# Patient Record
Sex: MALE | Race: BLACK OR AFRICAN AMERICAN | NOT HISPANIC OR LATINO | Employment: FULL TIME
[De-identification: names, ages, dates, MRNs, and addresses within clinical notes are randomized per-mention and may not be internally consistent; named-entity substitution may affect disease eponyms.]

---

## 2019-11-05 ENCOUNTER — HEALTH MAINTENANCE LETTER (OUTPATIENT)
Age: 59
End: 2019-11-05

## 2020-01-22 ENCOUNTER — OFFICE VISIT (OUTPATIENT)
Dept: FAMILY MEDICINE | Facility: CLINIC | Age: 60
End: 2020-01-22
Payer: COMMERCIAL

## 2020-01-22 VITALS
HEART RATE: 61 BPM | DIASTOLIC BLOOD PRESSURE: 84 MMHG | OXYGEN SATURATION: 98 % | BODY MASS INDEX: 26.52 KG/M2 | WEIGHT: 165 LBS | SYSTOLIC BLOOD PRESSURE: 139 MMHG | HEIGHT: 66 IN | TEMPERATURE: 97.6 F

## 2020-01-22 DIAGNOSIS — M54.41 CHRONIC BILATERAL LOW BACK PAIN WITH BILATERAL SCIATICA: ICD-10-CM

## 2020-01-22 DIAGNOSIS — Z00.00 ROUTINE GENERAL MEDICAL EXAMINATION AT A HEALTH CARE FACILITY: Primary | ICD-10-CM

## 2020-01-22 DIAGNOSIS — G89.29 CHRONIC BILATERAL LOW BACK PAIN WITH BILATERAL SCIATICA: ICD-10-CM

## 2020-01-22 DIAGNOSIS — M54.42 CHRONIC BILATERAL LOW BACK PAIN WITH BILATERAL SCIATICA: ICD-10-CM

## 2020-01-22 PROCEDURE — 36415 COLL VENOUS BLD VENIPUNCTURE: CPT | Performed by: NURSE PRACTITIONER

## 2020-01-22 PROCEDURE — 99386 PREV VISIT NEW AGE 40-64: CPT | Mod: 25 | Performed by: NURSE PRACTITIONER

## 2020-01-22 PROCEDURE — 90715 TDAP VACCINE 7 YRS/> IM: CPT | Performed by: NURSE PRACTITIONER

## 2020-01-22 PROCEDURE — 82306 VITAMIN D 25 HYDROXY: CPT | Performed by: NURSE PRACTITIONER

## 2020-01-22 PROCEDURE — 80048 BASIC METABOLIC PNL TOTAL CA: CPT | Performed by: NURSE PRACTITIONER

## 2020-01-22 PROCEDURE — 80061 LIPID PANEL: CPT | Performed by: NURSE PRACTITIONER

## 2020-01-22 PROCEDURE — 99213 OFFICE O/P EST LOW 20 MIN: CPT | Mod: 25 | Performed by: NURSE PRACTITIONER

## 2020-01-22 PROCEDURE — 90471 IMMUNIZATION ADMIN: CPT | Performed by: NURSE PRACTITIONER

## 2020-01-22 PROCEDURE — G0103 PSA SCREENING: HCPCS | Performed by: NURSE PRACTITIONER

## 2020-01-22 ASSESSMENT — ENCOUNTER SYMPTOMS
ABDOMINAL PAIN: 0
HEMATOCHEZIA: 0
EYE PAIN: 0
FEVER: 0
NERVOUS/ANXIOUS: 0
COUGH: 0
DIARRHEA: 0
CONSTIPATION: 0
HEMATURIA: 0
FREQUENCY: 0
SORE THROAT: 0
HEADACHES: 0
MYALGIAS: 0
PARESTHESIAS: 0
WEAKNESS: 0
DYSURIA: 0
NAUSEA: 0
HEARTBURN: 0
JOINT SWELLING: 0
ARTHRALGIAS: 0
DIZZINESS: 0
PALPITATIONS: 0
SHORTNESS OF BREATH: 0
CHILLS: 0

## 2020-01-22 ASSESSMENT — MIFFLIN-ST. JEOR: SCORE: 1508.25

## 2020-01-22 NOTE — PROGRESS NOTES
SUBJECTIVE:   CC: Callum Haywood is an 59 year old male who presents for preventative health visit.     Healthy Habits:     Getting at least 3 servings of Calcium per day:  Yes    Bi-annual eye exam:  Yes    Dental care twice a year:  NO    Sleep apnea or symptoms of sleep apnea:  None    Diet:  Carbohydrate counting    Duration of exercise:  15-30 minutes    Taking medications regularly:  Yes    Medication side effects:  None and Other    PHQ-2 Total Score: 2    Additional concerns today:  No  low back pain - continues, present for many years  Pain can radiate down the back of his thighs bilaterally intermittently.  No paresthesias, weakness.  Worse with standing or sitting in place for long periods of time.    He has tried physical therapy.    He does run most days of the week, no pain with running.               Today's PHQ-2 Score:   PHQ-2 ( 1999 [employer]) 1/22/2020   Q1: Little interest or pleasure in doing things 1   Q2: Feeling down, depressed or hopeless 1   PHQ-2 Score 2   Q1: Little interest or pleasure in doing things Several days   Q2: Feeling down, depressed or hopeless Several days   PHQ-2 Score 2       Abuse: Current or Past(Physical, Sexual or Emotional)- No  Do you feel safe in your environment? Yes    Have you ever done Advance Care Planning? (For example, a Health Directive, POLST, or a discussion with a medical provider or your loved ones about your wishes): No     Social History     Tobacco Use     Smoking status: Never Smoker     Smokeless tobacco: Never Used   Substance Use Topics     Alcohol use: Yes     Comment: once a month         Alcohol Use 1/22/2020   Prescreen: >3 drinks/day or >7 drinks/week? No   Prescreen: >3 drinks/day or >7 drinks/week? -       Last PSA:   PSA   Date Value Ref Range Status   08/11/2016 0.61 0 - 4 ug/L Final     Comment:     Assay Method:  Chemiluminescence using Siemens Vista analyzer       Reviewed orders with patient. Reviewed health maintenance and updated  "orders accordingly - Yes      Reviewed and updated as needed this visit by clinical staff  Tobacco  Allergies  Meds  Med Hx  Surg Hx  Fam Hx  Soc Hx        Reviewed and updated as needed this visit by Provider            Review of Systems   Constitutional: Negative for chills and fever.   HENT: Negative for congestion, ear pain, hearing loss and sore throat.    Eyes: Negative for pain and visual disturbance.   Respiratory: Negative for cough and shortness of breath.    Cardiovascular: Negative for chest pain, palpitations and peripheral edema.   Gastrointestinal: Negative for abdominal pain, constipation, diarrhea, heartburn, hematochezia and nausea.   Genitourinary: Negative for discharge, dysuria, frequency, genital sores, hematuria, impotence and urgency.   Musculoskeletal: Negative for arthralgias, joint swelling and myalgias.   Skin: Negative for rash.   Neurological: Negative for dizziness, weakness, headaches and paresthesias.   Psychiatric/Behavioral: Negative for mood changes. The patient is not nervous/anxious.          OBJECTIVE:   /84   Pulse 61   Temp 97.6  F (36.4  C) (Oral)   Ht 1.664 m (5' 5.5\")   Wt 74.8 kg (165 lb)   SpO2 98%   BMI 27.04 kg/m      Physical Exam  GENERAL: healthy, alert and no distress  EYES: Eyes grossly normal to inspection, PERRL and conjunctivae and sclerae normal  HENT: ear canals and TM's normal, nose and mouth without ulcers or lesions  NECK: no adenopathy, no asymmetry, masses, or scars and thyroid normal to palpation  RESP: lungs clear to auscultation - no rales, rhonchi or wheezes  CV: regular rate and rhythm, normal S1 S2, no S3 or S4, no murmur, click or rub, no peripheral edema and peripheral pulses strong  ABDOMEN: soft, nontender, no hepatosplenomegaly, no masses and bowel sounds normal  MS: Back inspection: symmetrical, no spinal curvature  Tenderness: no vertebral tenderness  Musculoskeletal: ROM: full  Neuro: Strength: 5/5 lower extremities " "bilaterally, able to heel walk and toe walk  Sensation: sensation to light touch grossly intact and equal bilaterally  Reflexes: patellar reflex 2/4 bilaterally, achilles reflex 2/4 bilaterally  Straight leg raise: negative  Cross leg raise: negative  SKIN: no suspicious lesions or rashes  NEURO: Normal strength and tone, mentation intact and speech normal  PSYCH: mentation appears normal, affect normal/bright        ASSESSMENT/PLAN:   (Z00.00) Routine general medical examination at a health care facility  (primary encounter diagnosis)  Comment:   Plan: Lipid panel reflex to direct LDL Fasting, PSA,         screen, Vitamin D Deficiency, Basic metabolic         panel            (M54.42,  M54.41,  G89.29) Chronic bilateral low back pain with bilateral sciatica  Comment:   Plan: MR Lumbar Spine w/o Contrast        Symptoms persist despite conservative treatment.  Will get an MRI and follow up with results.  Consider referral to spine specialist.       COUNSELING:   Reviewed preventive health counseling, as reflected in patient instructions    Estimated body mass index is 27.04 kg/m  as calculated from the following:    Height as of this encounter: 1.664 m (5' 5.5\").    Weight as of this encounter: 74.8 kg (165 lb).     Weight management plan: Discussed healthy diet and exercise guidelines     reports that he has never smoked. He has never used smokeless tobacco.      Counseling Resources:  ATP IV Guidelines  Pooled Cohorts Equation Calculator  FRAX Risk Assessment  ICSI Preventive Guidelines  Dietary Guidelines for Americans, 2010  USDA's MyPlate  ASA Prophylaxis  Lung CA Screening    Stephie Lewis NP  [facility]  "

## 2020-01-23 LAB
ANION GAP SERPL CALCULATED.3IONS-SCNC: 6 MMOL/L (ref 3–14)
BUN SERPL-MCNC: 10 MG/DL (ref 7–30)
CALCIUM SERPL-MCNC: 9.2 MG/DL (ref 8.5–10.1)
CHLORIDE SERPL-SCNC: 105 MMOL/L (ref 94–109)
CHOLEST SERPL-MCNC: 234 MG/DL
CO2 SERPL-SCNC: 28 MMOL/L (ref 20–32)
CREAT SERPL-MCNC: 0.73 MG/DL (ref 0.66–1.25)
DEPRECATED CALCIDIOL+CALCIFEROL SERPL-MC: 30 UG/L (ref 20–75)
GFR SERPL CREATININE-BSD FRML MDRD: >90 ML/MIN/{1.73_M2}
GLUCOSE SERPL-MCNC: 107 MG/DL (ref 70–99)
HDLC SERPL-MCNC: 51 MG/DL
LDLC SERPL CALC-MCNC: 138 MG/DL
NONHDLC SERPL-MCNC: 183 MG/DL
POTASSIUM SERPL-SCNC: 4.3 MMOL/L (ref 3.4–5.3)
PSA SERPL-ACNC: 0.67 UG/L (ref 0–4)
SODIUM SERPL-SCNC: 139 MMOL/L (ref 133–144)
TRIGL SERPL-MCNC: 224 MG/DL

## 2020-01-30 ENCOUNTER — ANCILLARY PROCEDURE (OUTPATIENT)
Dept: MRI IMAGING | Facility: CLINIC | Age: 60
End: 2020-01-30
Attending: NURSE PRACTITIONER
Payer: COMMERCIAL

## 2020-01-30 DIAGNOSIS — M54.42 CHRONIC BILATERAL LOW BACK PAIN WITH BILATERAL SCIATICA: ICD-10-CM

## 2020-01-30 DIAGNOSIS — G89.29 CHRONIC BILATERAL LOW BACK PAIN WITH BILATERAL SCIATICA: ICD-10-CM

## 2020-01-30 DIAGNOSIS — M54.41 CHRONIC BILATERAL LOW BACK PAIN WITH BILATERAL SCIATICA: ICD-10-CM

## 2020-02-04 DIAGNOSIS — G89.29 CHRONIC BILATERAL LOW BACK PAIN WITH BILATERAL SCIATICA: Primary | ICD-10-CM

## 2020-02-04 DIAGNOSIS — M54.41 CHRONIC BILATERAL LOW BACK PAIN WITH BILATERAL SCIATICA: Primary | ICD-10-CM

## 2020-02-04 DIAGNOSIS — M54.42 CHRONIC BILATERAL LOW BACK PAIN WITH BILATERAL SCIATICA: Primary | ICD-10-CM

## 2020-02-13 ENCOUNTER — DOCUMENTATION ONLY (OUTPATIENT)
Dept: FAMILY MEDICINE | Facility: CLINIC | Age: 60
End: 2020-02-13

## 2020-02-19 ENCOUNTER — OFFICE VISIT (OUTPATIENT)
Dept: ORTHOPEDICS | Facility: CLINIC | Age: 60
End: 2020-02-19
Payer: COMMERCIAL

## 2020-02-19 VITALS
WEIGHT: 168.2 LBS | SYSTOLIC BLOOD PRESSURE: 110 MMHG | HEIGHT: 66 IN | BODY MASS INDEX: 27.03 KG/M2 | DIASTOLIC BLOOD PRESSURE: 72 MMHG

## 2020-02-19 DIAGNOSIS — M54.42 CHRONIC BILATERAL LOW BACK PAIN WITH BILATERAL SCIATICA: ICD-10-CM

## 2020-02-19 DIAGNOSIS — M51.369 LUMBAR DEGENERATIVE DISC DISEASE: Primary | ICD-10-CM

## 2020-02-19 DIAGNOSIS — M54.41 CHRONIC BILATERAL LOW BACK PAIN WITH BILATERAL SCIATICA: ICD-10-CM

## 2020-02-19 DIAGNOSIS — G89.29 CHRONIC BILATERAL LOW BACK PAIN WITH BILATERAL SCIATICA: ICD-10-CM

## 2020-02-19 PROCEDURE — 99243 OFF/OP CNSLTJ NEW/EST LOW 30: CPT | Performed by: FAMILY MEDICINE

## 2020-02-19 ASSESSMENT — MIFFLIN-ST. JEOR: SCORE: 1522.76

## 2020-02-19 NOTE — PROGRESS NOTES
CHIEF COMPLAINT:  Low back pain affecting lower extremities     REFERRED BY: Stephie Lewis NP    HISTORY OF PRESENT ILLNESS  Mr. Haywood is a pleasant 59 year old year old male who presents to clinic today with acute on chronic low back pain.  Present for many years without acute inciting event or trauma.  Pain localizes to bilateral low back with radiation into posterior thighs.  No associated weakness.  Exacerbated by prolonged sitting or standing. Improved with laying and positional changes.    Today patient states that he has continued pain of posterior thighs with prolonged sitting as he was in his car.  Currently no pain down legs or back.  Pain is exacerbated with rotational components of the back. He exercises regularly and performs HEP given to him by PT in 2015.    Treatment to date: PT 2015, no medications OTC, MRI last month.      Additional history: as documented    MEDICAL HISTORY  Patient Active Problem List   Diagnosis     GERD     IBS     PSYCHOSEXUAL DISORDER     HYPERLIPIDEMIA LDL GOAL <160     Posttraumatic stress disorder     Health Care Home     Low back pain       Current Outpatient Medications   Medication Sig Dispense Refill     cholecalciferol (VITAMIN D) 1000 UNIT tablet Take 1 tablet (1,000 Units) by mouth daily 100 tablet 3       Allergies   Allergen Reactions     No Known Allergies      Seasonal Allergies        Family History   Problem Relation Age of Onset     C.A.D. No family hx of      Diabetes No family hx of      Hypertension No family hx of      Cerebrovascular Disease No family hx of      Breast Cancer No family hx of      Cancer - colorectal No family hx of      Prostate Cancer No family hx of      Alcohol/Drug No family hx of      Allergies No family hx of      Alzheimer Disease No family hx of        Additional medical/Social/Surgical histories reviewed in [facility] and updated as appropriate.     REVIEW OF SYSTEMS (2/19/2020)  A 10-point review of systems was obtained and  is negative except for as noted in the HPI.      PHYSICAL EXAM  There were no vitals taken for this visit.    General  - normal appearance, in no obvious distress  CV  - normal peripheral perfusion  Pulm  - normal respiratory pattern, non-labored  Musculoskeletal - lumbar spine  - stance: normal gait without limp, no obvious leg length discrepancy, normal heel and toe walk  - inspection: normal bone and joint alignment, no obvious scoliosis  - palpation: no paravertebral or bony tenderness  - ROM: flexion and rotation, left sidebending exacerbates pain, normal unrestricted flexion, extension, sidebending, rotation  - strength: lower extremities 5/5 in all planes  - special tests:  (-) straight leg raise  (-) slump test  Neuro  - patellar and Achilles DTRs 2+ bilaterally, no lower extremity sensory deficit throughout L5 distribution, grossly normal coordination, normal muscle tone  Skin  - no ecchymosis, erythema, warmth, or induration, no obvious rash  Psych  - interactive, appropriate, normal mood and affect    IMAGING :   Impression: Multilevel lumbar spondylosis, the most notable as  follows:   1. L4-L5 moderate spinal canal narrowing and likely impingement on the  descending L5 nerve roots bilaterally. Moderate bilateral L4-L5 neural  foraminal narrowing.  2. L5-S1 with moderate to severe left neural foraminal narrowing and  impingement on the exiting L5 nerve root. Moderate right neural  foraminal narrowing.      I have personally reviewed the examination and initial interpretation  and I agree with the findings.     DIMAS CASTILLO MD    ASSESSMENT & PLAN    Diagnosis: Lumbar degenerative disc disease.  Lumbar radiculopathy affecting bilateral lower extremities.    Mr. Haywood is a 59 year old year old male who presents to clinic today with acute on chronic low back pain.  MRI revealing L4-S1 spinal stenosis with impingements on L5 nerve root.  We did detailed discussion about treatment options going  forward for his lumbar pathology.  At this time my recommendation would be to consider epidural steroid injection, most likely we would target level of L4-L5.  I also recommended that he consider repeat physical therapy after epidural injection.  He is less keen on this option as he had a poor response previously, I encouraged him that this is usually synergistic with injection.  In addition to this I discussed that if he does not want an epidural, he may consider gabapentin as an option.  Given his overall disinterest in pharmacotherapy, I do not think he would be as compliant or interested in taking this medication 3 times daily.  Lastly we discussed surgical options and he would like to avoid this at all costs at this time.  I believe that given his lack of initial interventions that we can manage this more conservatively with the first options outlined above.    He would like to discuss these options with Dr. Lewis and he may call my office if he wishes to proceed with the options we outlined.  I will place orders at that time. He also knows I would like to see him 2-3 weeks after YANIRA if he proceeds this direction.    It was a pleasure seeing Callum today.    Moiz Hayes DO, CAM  Primary Care Sports Medicine

## 2020-02-19 NOTE — PROGRESS NOTES
3        SPORTS & ORTHOPEDIC WALK-IN VISIT 2/19/2020    Primary Care Physician: Dr. Hayes    Reason for visit:     What part of your body is injured / painful?  bilateral low back    What caused the injury /pain? No inciting event     How long ago did your injury occur or pain begin? 10 years, worsening past 3 months    What are your most bothersome symptoms? Pain and Weakness    How would you characterize your symptom?  aching and sharp    What makes your symptoms better? Nothing    What makes your symptoms worse? Other: Running and trunk movement    Have you been previously seen for this problem? Yes    Medical History:    Any recent changes to your medical history? No    Any new medication prescribed since last visit? No    Have you had surgery on this body part before? No    Social History:    Occupation:     Handedness: Right    Exercise: Most days/week

## 2020-02-19 NOTE — LETTER
2/19/2020         RE: Callum Haywood  [address]  [place] [address]        Dear Colleague,    Thank you for referring your patient, aCllum Haywood, to the [facility]. Please see a copy of my visit note below.    CHIEF COMPLAINT:  Low back pain affecting lower extremities     REFERRED BY: Stephie Lewis NP    HISTORY OF PRESENT ILLNESS  Mr. Haywood is a pleasant 59 year old year old male who presents to clinic today with acute on chronic low back pain.  Present for many years without acute inciting event or trauma.  Pain localizes to bilateral low back with radiation into posterior thighs.  No associated weakness.  Exacerbated by prolonged sitting or standing. Improved with laying and positional changes.    Today patient states that he has continued pain of posterior thighs with prolonged sitting as he was in his car.  Currently no pain down legs or back.  Pain is exacerbated with rotational components of the back. He exercises regularly and performs HEP given to him by PT in 2015.    Treatment to date: PT 2015, no medications OTC, MRI last month.      Additional history: as documented    MEDICAL HISTORY  Patient Active Problem List   Diagnosis     GERD     IBS     PSYCHOSEXUAL DISORDER     HYPERLIPIDEMIA LDL GOAL <160     Posttraumatic stress disorder     Health Care Home     Low back pain       Current Outpatient Medications   Medication Sig Dispense Refill     cholecalciferol (VITAMIN D) 1000 UNIT tablet Take 1 tablet (1,000 Units) by mouth daily 100 tablet 3       Allergies   Allergen Reactions     No Known Allergies      Seasonal Allergies        Family History   Problem Relation Age of Onset     C.A.D. No family hx of      Diabetes No family hx of      Hypertension No family hx of      Cerebrovascular Disease No family hx of      Breast Cancer No family hx of      Cancer - colorectal No family hx of      Prostate Cancer No family hx of       Alcohol/Drug No family hx of      Allergies No family hx of      Alzheimer Disease No family hx of        Additional medical/Social/Surgical histories reviewed in [facility] and updated as appropriate.     REVIEW OF SYSTEMS (2/19/2020)  A 10-point review of systems was obtained and is negative except for as noted in the HPI.      PHYSICAL EXAM  There were no vitals taken for this visit.    General  - normal appearance, in no obvious distress  CV  - normal peripheral perfusion  Pulm  - normal respiratory pattern, non-labored  Musculoskeletal - lumbar spine  - stance: normal gait without limp, no obvious leg length discrepancy, normal heel and toe walk  - inspection: normal bone and joint alignment, no obvious scoliosis  - palpation: no paravertebral or bony tenderness  - ROM: flexion and rotation, left sidebending exacerbates pain, normal unrestricted flexion, extension, sidebending, rotation  - strength: lower extremities 5/5 in all planes  - special tests:  (-) straight leg raise  (-) slump test  Neuro  - patellar and Achilles DTRs 2+ bilaterally, no lower extremity sensory deficit throughout L5 distribution, grossly normal coordination, normal muscle tone  Skin  - no ecchymosis, erythema, warmth, or induration, no obvious rash  Psych  - interactive, appropriate, normal mood and affect    IMAGING :   Impression: Multilevel lumbar spondylosis, the most notable as  follows:   1. L4-L5 moderate spinal canal narrowing and likely impingement on the  descending L5 nerve roots bilaterally. Moderate bilateral L4-L5 neural  foraminal narrowing.  2. L5-S1 with moderate to severe left neural foraminal narrowing and  impingement on the exiting L5 nerve root. Moderate right neural  foraminal narrowing.      I have personally reviewed the examination and initial interpretation  and I agree with the findings.     DIMAS CASTILLO MD    ASSESSMENT & PLAN    Diagnosis: Lumbar degenerative disc disease.  Lumbar radiculopathy affecting  bilateral lower extremities.    Mr. Haywood is a 59 year old year old male who presents to clinic today with acute on chronic low back pain.  MRI revealing L4-S1 spinal stenosis with impingements on L5 nerve root.  We did detailed discussion about treatment options going forward for his lumbar pathology.  At this time my recommendation would be to consider epidural steroid injection, most likely we would target level of L4-L5.  I also recommended that he consider repeat physical therapy after epidural injection.  He is less keen on this option as he had a poor response previously, I encouraged him that this is usually synergistic with injection.  In addition to this I discussed that if he does not want an epidural, he may consider gabapentin as an option.  Given his overall disinterest in pharmacotherapy, I do not think he would be as compliant or interested in taking this medication 3 times daily.  Lastly we discussed surgical options and he would like to avoid this at all costs at this time.  I believe that given his lack of initial interventions that we can manage this more conservatively with the first options outlined above.    He would like to discuss these options with Dr. Lewis and he may call my office if he wishes to proceed with the options we outlined.  I will place orders at that time. He also knows I would like to see him 2-3 weeks after YANIRA if he proceeds this direction.    It was a pleasure seeing Callum today.    Moiz Hayes DO, [facility]  Primary Care Sports Medicine      3        SPORTS & ORTHOPEDIC WALK-IN VISIT 2/19/2020    Primary Care Physician: Dr. Hayes    Reason for visit:     What part of your body is injured / painful?  bilateral low back    What caused the injury /pain? No inciting event     How long ago did your injury occur or pain begin? 10 years, worsening past 3 months    What are your most bothersome symptoms? Pain and Weakness    How would you characterize your symptom?  aching  and sharp    What makes your symptoms better? Nothing    What makes your symptoms worse? Other: Running and trunk movement    Have you been previously seen for this problem? Yes    Medical History:    Any recent changes to your medical history? No    Any new medication prescribed since last visit? No    Have you had surgery on this body part before? No    Social History:    Occupation:     Handedness: Right    Exercise: Most days/week         Again, thank you for allowing me to participate in the care of your patient.        Sincerely,        Moiz Hayes, DO

## 2020-02-26 ENCOUNTER — OFFICE VISIT (OUTPATIENT)
Dept: FAMILY MEDICINE | Facility: CLINIC | Age: 60
End: 2020-02-26
Payer: COMMERCIAL

## 2020-02-26 VITALS
TEMPERATURE: 97.4 F | RESPIRATION RATE: 14 BRPM | BODY MASS INDEX: 26.68 KG/M2 | HEART RATE: 65 BPM | DIASTOLIC BLOOD PRESSURE: 78 MMHG | SYSTOLIC BLOOD PRESSURE: 128 MMHG | HEIGHT: 66 IN | OXYGEN SATURATION: 98 % | WEIGHT: 166 LBS

## 2020-02-26 DIAGNOSIS — M51.369 DDD (DEGENERATIVE DISC DISEASE), LUMBAR: Primary | ICD-10-CM

## 2020-02-26 PROCEDURE — 99213 OFFICE O/P EST LOW 20 MIN: CPT | Performed by: NURSE PRACTITIONER

## 2020-02-26 ASSESSMENT — MIFFLIN-ST. JEOR: SCORE: 1512.78

## 2020-02-26 NOTE — Clinical Note
I meet with Callum today and he would like to proceed with the epidural.  He would prefer that it be scheduled on a Friday, if possible.  I told him he should hear from someone from your team to schedule this.

## 2020-02-26 NOTE — PROGRESS NOTES
"SUBJECTIVE:  Callum Haywood, a 59 year old male scheduled an appointment to discuss the following issues:  DDD (degenerative disc disease), lumbar  He has been having bilateral thigh pain, recent MRI showed:  Impression: Multilevel lumbar spondylosis, the most notable as  follows:   1. L4-L5 moderate spinal canal narrowing and likely impingement on the  descending L5 nerve roots bilaterally. Moderate bilateral L4-L5 neural  foraminal narrowing.  2. L5-S1 with moderate to severe left neural foraminal narrowing and  impingement on the exiting L5 nerve root. Moderate right neural  foraminal narrowing  Medical, social, surgical, and family histories reviewed.    He did see sports medicine and treatment options were discussed.  He wanted to follow up with me to discuss these options.    ROS:  CONSTITUTIONAL: NEGATIVE for fever, chills  RESP: NEGATIVE for significant cough or SOB  CV: NEGATIVE for chest pain, palpitations   GI: NEGATIVE for nausea, abdominal pain, heartburn, or change in bowel habits  : NEGATIVE for frequency, dysuria, or hematuria  MUSCULOSKELETAL:see HPI  NEURO: NEGATIVE for weakness, dizziness or paresthesias or headache    OBJECTIVE:  /78   Pulse 65   Temp 97.4  F (36.3  C)   Resp 14   Ht 1.664 m (5' 5.5\")   Wt 75.3 kg (166 lb)   SpO2 98%   BMI 27.20 kg/m    EXAM:  GENERAL APPEARANCE: healthy, alert and no distress  PSYCH: mentation appears normal and affect normal/bright    ASSESSMENT/PLAN:  (M51.36) DDD (degenerative disc disease), lumbar  (primary encounter diagnosis)  Comment:   Plan: Discussed Dr. Hayes's recommendations and he is comfortable proceeding with YANIRA.  I will reach out to Dr. Hayes to go ahead with scheduling this .          "

## 2020-02-27 DIAGNOSIS — G89.29 CHRONIC BILATERAL LOW BACK PAIN WITH BILATERAL SCIATICA: Primary | ICD-10-CM

## 2020-02-27 DIAGNOSIS — M54.42 CHRONIC BILATERAL LOW BACK PAIN WITH BILATERAL SCIATICA: Primary | ICD-10-CM

## 2020-02-27 DIAGNOSIS — M54.41 CHRONIC BILATERAL LOW BACK PAIN WITH BILATERAL SCIATICA: Primary | ICD-10-CM

## 2020-03-09 ENCOUNTER — OFFICE VISIT (OUTPATIENT)
Dept: URGENT CARE | Facility: URGENT CARE | Age: 60
End: 2020-03-09
Payer: COMMERCIAL

## 2020-03-09 VITALS
BODY MASS INDEX: 27.2 KG/M2 | RESPIRATION RATE: 16 BRPM | HEART RATE: 77 BPM | TEMPERATURE: 98.6 F | OXYGEN SATURATION: 99 % | WEIGHT: 166 LBS | DIASTOLIC BLOOD PRESSURE: 80 MMHG | SYSTOLIC BLOOD PRESSURE: 143 MMHG

## 2020-03-09 DIAGNOSIS — R51.9 MILD HEADACHE: ICD-10-CM

## 2020-03-09 DIAGNOSIS — L29.9 PRURITIC DISORDER: Primary | ICD-10-CM

## 2020-03-09 DIAGNOSIS — K30 UPSET STOMACH: ICD-10-CM

## 2020-03-09 LAB
BASOPHILS # BLD AUTO: 0 10E9/L (ref 0–0.2)
BASOPHILS NFR BLD AUTO: 0.2 %
DIFFERENTIAL METHOD BLD: NORMAL
EOSINOPHIL # BLD AUTO: 0 10E9/L (ref 0–0.7)
EOSINOPHIL NFR BLD AUTO: 0.5 %
ERYTHROCYTE [DISTWIDTH] IN BLOOD BY AUTOMATED COUNT: 12.1 % (ref 10–15)
HCT VFR BLD AUTO: 44.4 % (ref 40–53)
HGB BLD-MCNC: 15.3 G/DL (ref 13.3–17.7)
LYMPHOCYTES # BLD AUTO: 1 10E9/L (ref 0.8–5.3)
LYMPHOCYTES NFR BLD AUTO: 17.4 %
MCH RBC QN AUTO: 30.6 PG (ref 26.5–33)
MCHC RBC AUTO-ENTMCNC: 34.5 G/DL (ref 31.5–36.5)
MCV RBC AUTO: 89 FL (ref 78–100)
MONOCYTES # BLD AUTO: 0.8 10E9/L (ref 0–1.3)
MONOCYTES NFR BLD AUTO: 14.3 %
NEUTROPHILS # BLD AUTO: 3.7 10E9/L (ref 1.6–8.3)
NEUTROPHILS NFR BLD AUTO: 67.6 %
PLATELET # BLD AUTO: 198 10E9/L (ref 150–450)
RBC # BLD AUTO: 5 10E12/L (ref 4.4–5.9)
WBC # BLD AUTO: 5.5 10E9/L (ref 4–11)

## 2020-03-09 PROCEDURE — 36415 COLL VENOUS BLD VENIPUNCTURE: CPT | Performed by: FAMILY MEDICINE

## 2020-03-09 PROCEDURE — 99214 OFFICE O/P EST MOD 30 MIN: CPT | Performed by: FAMILY MEDICINE

## 2020-03-09 PROCEDURE — 85025 COMPLETE CBC W/AUTO DIFF WBC: CPT | Performed by: FAMILY MEDICINE

## 2020-03-09 NOTE — PROGRESS NOTES
SUBJECTIVE: Callum Haywood is a 59 year old male presenting with a chief complaint of itchy skin, ha and upset stomach.  Onset of symptoms was 2 day(s) ago.  Course of illness is improving.    Severity mild  Current and Associated symptoms: none  Treatment measures tried include None tried.  Predisposing factors include None.    Past Medical History:   Diagnosis Date     Esophageal reflux      Helicobacter pylori (H. pylori)      Irritable bowel syndrome      Other specified psychosexual disorder      Pure hypercholesterolemia      Allergies   Allergen Reactions     No Known Allergies      Seasonal Allergies      Social History     Tobacco Use     Smoking status: Never Smoker     Smokeless tobacco: Never Used   Substance Use Topics     Alcohol use: Yes     Comment: once a month       ROS:  SKIN: no rash  GI: no vomiting    OBJECTIVE:  BP (!) 143/80   Pulse 77   Temp 98.6  F (37  C) (Oral)   Resp 16   Wt 75.3 kg (166 lb)   SpO2 99%   BMI 27.20 kg/m  GENERAL APPEARANCE: healthy, alert and no distress  EYES: EOMI,  PERRL, conjunctiva clear  HENT: ear canals and TM's normal.  Nose and mouth without ulcers, erythema or lesions  NECK: supple, nontender, no lymphadenopathy  RESP: lungs clear to auscultation - no rales, rhonchi or wheezes  CV: regular rates and rhythm, normal S1 S2, no murmur noted  ABDOMEN:  soft, nontender, no HSM or masses and bowel sounds normal  NEURO: Normal strength and tone, sensory exam grossly normal,  normal speech and mentation  SKIN: no suspicious lesions or rashes      ICD-10-CM    1. Pruritic disorder  L29.9 CBC with platelets differential   2. Upset stomach  K30 CBC with platelets differential   3. Mild headache  R51 CBC with platelets differential       Fluids/Rest, f/u if worse/not any better

## 2020-03-10 ENCOUNTER — OFFICE VISIT (OUTPATIENT)
Dept: URGENT CARE | Facility: URGENT CARE | Age: 60
End: 2020-03-10
Payer: COMMERCIAL

## 2020-03-10 VITALS
TEMPERATURE: 97.9 F | WEIGHT: 165 LBS | OXYGEN SATURATION: 98 % | DIASTOLIC BLOOD PRESSURE: 79 MMHG | SYSTOLIC BLOOD PRESSURE: 118 MMHG | HEART RATE: 85 BPM | BODY MASS INDEX: 26.52 KG/M2 | HEIGHT: 66 IN

## 2020-03-10 DIAGNOSIS — R42 DIZZINESS: ICD-10-CM

## 2020-03-10 DIAGNOSIS — R51.9 TEMPORAL HEADACHE: Primary | ICD-10-CM

## 2020-03-10 DIAGNOSIS — R51.9 FRONTAL HEADACHE: ICD-10-CM

## 2020-03-10 LAB — ERYTHROCYTE [SEDIMENTATION RATE] IN BLOOD BY WESTERGREN METHOD: 36 MM/H (ref 0–20)

## 2020-03-10 PROCEDURE — 85652 RBC SED RATE AUTOMATED: CPT | Performed by: INTERNAL MEDICINE

## 2020-03-10 PROCEDURE — 99214 OFFICE O/P EST MOD 30 MIN: CPT | Performed by: INTERNAL MEDICINE

## 2020-03-10 PROCEDURE — 80048 BASIC METABOLIC PNL TOTAL CA: CPT | Performed by: INTERNAL MEDICINE

## 2020-03-10 PROCEDURE — 36415 COLL VENOUS BLD VENIPUNCTURE: CPT | Performed by: INTERNAL MEDICINE

## 2020-03-10 RX ORDER — NAPROXEN 500 MG/1
500 TABLET ORAL 2 TIMES DAILY WITH MEALS
Qty: 28 TABLET | Refills: 0 | Status: SHIPPED | OUTPATIENT
Start: 2020-03-10 | End: 2020-03-24

## 2020-03-10 RX ORDER — ACETAMINOPHEN 500 MG
500-1000 TABLET ORAL EVERY 6 HOURS PRN
COMMUNITY
End: 2021-02-22

## 2020-03-10 RX ORDER — IBUPROFEN 200 MG
200 TABLET ORAL EVERY 4 HOURS PRN
COMMUNITY
End: 2021-02-22

## 2020-03-10 ASSESSMENT — ENCOUNTER SYMPTOMS
SLEEP DISTURBANCE: 0
WEAKNESS: 0
RHINORRHEA: 0
BRUISES/BLEEDS EASILY: 0
NUMBNESS: 0
DIARRHEA: 0
SORE THROAT: 0
COUGH: 0
VOMITING: 0
DIFFICULTY URINATING: 0
SHORTNESS OF BREATH: 0
SPEECH DIFFICULTY: 0
EYE PAIN: 0
FACIAL ASYMMETRY: 0

## 2020-03-10 ASSESSMENT — MIFFLIN-ST. JEOR: SCORE: 1508.25

## 2020-03-10 NOTE — LETTER
[facility]  [address] [place] [address]  Phone: [phone]    March 10, 2020        Callum Wangdeyoanna  [address]  [place] [address]          To whom it may concern:    RE: Callum Haywood    Patient was seen and treated today at our clinic and missed work.  Please excuse work absence for up to 3 days for illness.    Please contact me for questions or concerns.      Sincerely,        Silke Sawant MD

## 2020-03-10 NOTE — PATIENT INSTRUCTIONS
Naprosyn 500 mg 2 x day with food  Sleep.  Stretching.  Yoga  Ice /heat.    Hansen, 3 meals, 2 snacks    Recheck 2-3 days at clinic.    Work note.

## 2020-03-10 NOTE — PROGRESS NOTES
SUBJECTIVE:   Callum Haywood is a 59 year old male presenting with a chief complaint of   Chief Complaint   Patient presents with     Urgent Care     Pt in clinic to have eval for headache and dizziness.     Headache     Dizziness       He is an established patient of [facility].    Headache    Onset of symptoms was 3 -4 day(s).    Seen yesterday for symptoms     Blood test.    Component      Latest Ref Rng & Units 3/9/2020   WBC      4.0 - 11.0 10e9/L 5.5   RBC Count      4.4 - 5.9 10e12/L 5.00   Hemoglobin      13.3 - 17.7 g/dL 15.3   Hematocrit      40.0 - 53.0 % 44.4   MCV      78 - 100 fl 89   MCH      26.5 - 33.0 pg 30.6   MCHC      31.5 - 36.5 g/dL 34.5   RDW      10.0 - 15.0 % 12.1   Platelet Count      150 - 450 10e9/L 198   % Neutrophils      % 67.6   % Lymphocytes      % 17.4   % Monocytes      % 14.3   % Eosinophils      % 0.5   % Basophils      % 0.2   Absolute Neutrophil      1.6 - 8.3 10e9/L 3.7   Absolute Lymphocytes      0.8 - 5.3 10e9/L 1.0   Absolute Monocytes      0.0 - 1.3 10e9/L 0.8   Absolute Eosinophils      0.0 - 0.7 10e9/L 0.0   Absolute Basophils      0.0 - 0.2 10e9/L 0.0   Diff Method       Automated Method     Constant headache     Character of pain:pressure-like   Current and associated symptoms:   Dizziness.  Dizzy with standing up.  Pulsing headache at temporal headache   denies noise sensitivity, light sensitivity, visual disturbances, double vision, partial visual loss, paresthesias, aphasia and mental status changes  Location of pain: frontal and temporal  Prodromal sx?:  No  History of Migranes: No  Is headache similar to previous: No:   Predisposing factors: None  Treatment and measures tried: Tylenol    Work - thinks chemicals at work causing mask.  Exercises   Caffeine - coffee & tea daily - none while sick  No alcohol    Whole body ached  Stomach/back ache  Back pain improved     Tylenol helped aches        Review of Systems   HENT: Negative for rhinorrhea and sore throat.  "   Eyes: Negative for pain and visual disturbance.        Tearing eyes   Respiratory: Negative for cough and shortness of breath.    Gastrointestinal: Negative for diarrhea and vomiting.        Stomach ache improved    Genitourinary: Negative for difficulty urinating.   Musculoskeletal:        Aches resolved    Skin: Negative for rash.   Neurological: Negative for facial asymmetry, speech difficulty, weakness and numbness.   Hematological: Does not bruise/bleed easily.   Psychiatric/Behavioral: Negative for sleep disturbance.       Past Medical History:   Diagnosis Date     Esophageal reflux      Helicobacter pylori (H. pylori)      Irritable bowel syndrome      Other specified psychosexual disorder      Pure hypercholesterolemia      Family History   Problem Relation Age of Onset     C.A.D. No family hx of      Diabetes No family hx of      Hypertension No family hx of      Cerebrovascular Disease No family hx of      Breast Cancer No family hx of      Cancer - colorectal No family hx of      Prostate Cancer No family hx of      Alcohol/Drug No family hx of      Allergies No family hx of      Alzheimer Disease No family hx of      Current Outpatient Medications   Medication Sig Dispense Refill     acetaminophen (TYLENOL) 500 MG tablet Take 500-1,000 mg by mouth every 6 hours as needed for mild pain       cholecalciferol (VITAMIN D) 1000 UNIT tablet Take 1 tablet (1,000 Units) by mouth daily 100 tablet 3     ibuprofen (ADVIL/MOTRIN) 200 MG tablet Take 200 mg by mouth every 4 hours as needed for mild pain       naproxen (NAPROSYN) 500 MG tablet Take 1 tablet (500 mg) by mouth 2 times daily (with meals) for 14 days 28 tablet 0     Social History     Tobacco Use     Smoking status: Never Smoker     Smokeless tobacco: Never Used   Substance Use Topics     Alcohol use: Yes     Comment: once a month       OBJECTIVE  /79   Pulse 85   Temp 97.9  F (36.6  C) (Oral)   Ht 1.664 m (5' 5.5\")   Wt 74.8 kg (165 lb)   " SpO2 98%   BMI 27.04 kg/m      Physical Exam  Vitals signs reviewed.   Constitutional:       Appearance: Normal appearance.   HENT:      Right Ear: Tympanic membrane normal.      Left Ear: Tympanic membrane normal.      Nose: Nose normal.      Mouth/Throat:      Mouth: Mucous membranes are moist.   Eyes:      Extraocular Movements: Extraocular movements intact.      Conjunctiva/sclera: Conjunctivae normal.      Pupils: Pupils are equal, round, and reactive to light.      Comments: right fundi normal   Unable to visualize left    Neck:      Musculoskeletal: Normal range of motion and neck supple.   Cardiovascular:      Rate and Rhythm: Normal rate and regular rhythm.      Pulses: Normal pulses.      Heart sounds: Normal heart sounds.   Pulmonary:      Effort: Pulmonary effort is normal.      Breath sounds: Normal breath sounds.   Abdominal:      Palpations: Abdomen is soft.   Musculoskeletal:      Right lower leg: No edema.      Left lower leg: No edema.   Neurological:      General: No focal deficit present.      Mental Status: He is alert.      Cranial Nerves: No cranial nerve deficit.      Sensory: No sensory deficit.      Motor: No weakness.   Psychiatric:         Mood and Affect: Mood normal.         Behavior: Behavior normal.         Thought Content: Thought content normal.         Judgment: Judgment normal.         Labs:  No results found for this or any previous visit (from the past 24 hour(s)).        ASSESSMENT:      ICD-10-CM    1. Temporal headache  R51 Erythrocyte sedimentation rate auto     Basic metabolic panel     naproxen (NAPROSYN) 500 MG tablet   2. Frontal headache  R51 Erythrocyte sedimentation rate auto     Basic metabolic panel     naproxen (NAPROSYN) 500 MG tablet   3. Dizziness  R42 Basic metabolic panel        Medical Decision Making:    Differential Diagnosis:  Muscle tension  Illness  Chemical exposure at work  ESR for temporal arthritis    Neuro exam normal - doubt serious underlying  disease  Heart & lung normal for dizzyness    BMP for dizziness    Already had normal CBC yesterday    Serious Comorbid Conditions:  Adult:  None    PLAN:    Followup:    Recheck 2-3 days with clinic  Results will be on mychart    Patient Instructions   Naprosyn 500 mg 2 x day with food  Sleep.  Stretching.  Yoga  Ice /heat.    Hansen, 3 meals, 2 snacks    Recheck 2-3 days at clinic.    Work note.

## 2020-03-11 LAB
ANION GAP SERPL CALCULATED.3IONS-SCNC: 4 MMOL/L (ref 3–14)
BUN SERPL-MCNC: 12 MG/DL (ref 7–30)
CALCIUM SERPL-MCNC: 8.7 MG/DL (ref 8.5–10.1)
CHLORIDE SERPL-SCNC: 106 MMOL/L (ref 94–109)
CO2 SERPL-SCNC: 28 MMOL/L (ref 20–32)
CREAT SERPL-MCNC: 0.74 MG/DL (ref 0.66–1.25)
GFR SERPL CREATININE-BSD FRML MDRD: >90 ML/MIN/{1.73_M2}
GLUCOSE SERPL-MCNC: 159 MG/DL (ref 70–99)
POTASSIUM SERPL-SCNC: 3.6 MMOL/L (ref 3.4–5.3)
SODIUM SERPL-SCNC: 138 MMOL/L (ref 133–144)

## 2020-03-12 ENCOUNTER — HOSPITAL ENCOUNTER (OUTPATIENT)
Age: 60
End: 2020-03-12
Payer: COMMERCIAL

## 2020-03-12 ENCOUNTER — OFFICE VISIT (OUTPATIENT)
Dept: FAMILY MEDICINE | Facility: CLINIC | Age: 60
End: 2020-03-12
Payer: COMMERCIAL

## 2020-03-12 VITALS
HEART RATE: 63 BPM | BODY MASS INDEX: 27.43 KG/M2 | WEIGHT: 167.38 LBS | TEMPERATURE: 97.4 F | SYSTOLIC BLOOD PRESSURE: 135 MMHG | OXYGEN SATURATION: 100 % | DIASTOLIC BLOOD PRESSURE: 78 MMHG

## 2020-03-12 DIAGNOSIS — M54.42 CHRONIC BILATERAL LOW BACK PAIN WITH BILATERAL SCIATICA: ICD-10-CM

## 2020-03-12 DIAGNOSIS — R51.9 NONINTRACTABLE HEADACHE, UNSPECIFIED CHRONICITY PATTERN, UNSPECIFIED HEADACHE TYPE: Primary | ICD-10-CM

## 2020-03-12 DIAGNOSIS — M54.41 CHRONIC BILATERAL LOW BACK PAIN WITH BILATERAL SCIATICA: ICD-10-CM

## 2020-03-12 DIAGNOSIS — G89.29 CHRONIC BILATERAL LOW BACK PAIN WITH BILATERAL SCIATICA: ICD-10-CM

## 2020-03-12 PROCEDURE — 99213 OFFICE O/P EST LOW 20 MIN: CPT | Performed by: NURSE PRACTITIONER

## 2020-03-12 NOTE — PROGRESS NOTES
Subjective     Callum Haywood is a 59 year old male who presents to clinic today for the following health issues:    HPI     ED/UC Followup:    Facility: [facility]   Date of visit: 3/10/2020  Reason for visit: headaches  Current Status: Better: Naproxen has been helping   Tylenol and ibuprofen was not helping      He was seen on 3/9 and again on 3/10 in urgent care for headache and stomach ache.   He is getting relief now from Naprosyn.    Symptoms have since resolved.  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  He does wear a mask.      He needs to have an epidural scheduled.  He has not heard from anyone from Dr. Hayes's office.           Reviewed and updated as needed this visit by Provider         Review of Systems   ROS COMP: CONSTITUTIONAL: NEGATIVE for fever, chills, change in weight  ENT/MOUTH: NEGATIVE for ear, mouth and throat problems  RESP: NEGATIVE for significant cough or SOB  CV: NEGATIVE for chest pain, palpitations or peripheral edema  GI: NEGATIVE for nausea, abdominal pain, heartburn, or change in bowel habits  MUSCULOSKELETAL: see HPI  NEURO: NEGATIVE for weakness, dizziness or paresthesias  PSYCHIATRIC: NEGATIVE for changes in mood or affect      Objective    /78   Pulse 63   Temp 97.4  F (36.3  C) (Oral)   Wt 75.9 kg (167 lb 6 oz)   SpO2 100%   BMI 27.43 kg/m    Body mass index is 27.43 kg/m .  Physical Exam   GENERAL: healthy, alert and no distress  RESP: lungs clear to auscultation - no rales, rhonchi or wheezes  CV: regular rate and rhythm, normal S1 S2, no S3 or S4, no murmur, click or rub, no peripheral edema and peripheral pulses strong  NEURO: Normal strength and tone, mentation intact and speech normal  PSYCH: mentation appears normal, affect normal/bright            Assessment & Plan     (R51) Nonintractable headache, unspecified chronicity pattern, unspecified headache type  (primary encounter  diagnosis)  Comment: resolved  Plan: Note given to return to work.  Follow up if symptoms recur.     (M54.42,  M54.41,  G89.29) Chronic bilateral low back pain with bilateral sciatica  Comment:   Plan: It appears that the epidural order has been placed.  Scheduling number given.            No follow-ups on file.    Stephie Lewis, LULY  [facility]

## 2020-03-12 NOTE — LETTER
March 12, 2020      Callum GARCIA Yobany  [address]  [place] [address]        To Whom It May Concern,      Callum was seen in clinic today.  He may return to work today (3/12/20).           Sincerely,        Stephie Lewis, DNP, CNP

## 2020-03-19 ENCOUNTER — HOSPITAL ENCOUNTER (OUTPATIENT)
Facility: CLINIC | Age: 60
End: 2020-03-19
Payer: COMMERCIAL

## 2020-05-21 ENCOUNTER — TELEPHONE (OUTPATIENT)
Dept: MEDSURG UNIT | Facility: CLINIC | Age: 60
End: 2020-05-21

## 2020-05-21 NOTE — TELEPHONE ENCOUNTER
Pre-Procedure Unconfirmed COVID Test     Callum GARCIA Didierpaulas    COVID Screening  Due to the inability to confirm the patient's COVID status (positive or negative), the patient was screened for COVID symptoms     Patient reports the following:  Fever? No   Cough? No   Shortness of breath? No   Skin rash? No      Patient Information  Patient informed of the no visitor policy    Patient informed to contact the ordering provider if the following symptoms develop prior to procedure:   Fever  Cough  Shortness of Breath  Sore throat   Runny or stuffy nose  Muscle or body aches  Headaches  Fatigue  Vomiting or diarrhea   Rash    Ana Rosa Meyer RN

## 2020-05-22 ENCOUNTER — HOSPITAL ENCOUNTER (OUTPATIENT)
Dept: GENERAL RADIOLOGY | Facility: CLINIC | Age: 60
End: 2020-05-22
Attending: FAMILY MEDICINE
Payer: COMMERCIAL

## 2020-05-22 ENCOUNTER — HOSPITAL ENCOUNTER (OUTPATIENT)
Facility: CLINIC | Age: 60
Discharge: HOME | End: 2020-05-22
Admitting: FAMILY MEDICINE
Payer: COMMERCIAL

## 2020-05-22 VITALS
SYSTOLIC BLOOD PRESSURE: 126 MMHG | DIASTOLIC BLOOD PRESSURE: 68 MMHG | OXYGEN SATURATION: 98 % | TEMPERATURE: 98.2 F | RESPIRATION RATE: 16 BRPM | HEART RATE: 59 BPM

## 2020-05-22 DIAGNOSIS — G89.29 CHRONIC BILATERAL LOW BACK PAIN WITH BILATERAL SCIATICA: ICD-10-CM

## 2020-05-22 DIAGNOSIS — M54.41 CHRONIC BILATERAL LOW BACK PAIN WITH BILATERAL SCIATICA: ICD-10-CM

## 2020-05-22 DIAGNOSIS — M54.42 CHRONIC BILATERAL LOW BACK PAIN WITH BILATERAL SCIATICA: ICD-10-CM

## 2020-05-22 PROCEDURE — 25000125 ZZHC RX 250: Performed by: PHYSICIAN ASSISTANT

## 2020-05-22 PROCEDURE — 25000128 H RX IP 250 OP 636: Performed by: PHYSICIAN ASSISTANT

## 2020-05-22 PROCEDURE — 40000863 ZZH STATISTIC RADIOLOGY XRAY, US, CT, MAR, NM

## 2020-05-22 PROCEDURE — 62323 NJX INTERLAMINAR LMBR/SAC: CPT

## 2020-05-22 PROCEDURE — 25500064 ZZH RX 255 OP 636: Performed by: PHYSICIAN ASSISTANT

## 2020-05-22 RX ORDER — LIDOCAINE HYDROCHLORIDE 10 MG/ML
30 INJECTION, SOLUTION EPIDURAL; INFILTRATION; INTRACAUDAL; PERINEURAL ONCE
Status: COMPLETED | OUTPATIENT
Start: 2020-05-22 | End: 2020-05-22

## 2020-05-22 RX ORDER — NICOTINE POLACRILEX 4 MG
15-30 LOZENGE BUCCAL
Status: DISCONTINUED | OUTPATIENT
Start: 2020-05-22 | End: 2020-05-22 | Stop reason: HOSPADM

## 2020-05-22 RX ORDER — DEXTROSE MONOHYDRATE 25 G/50ML
25-50 INJECTION, SOLUTION INTRAVENOUS
Status: DISCONTINUED | OUTPATIENT
Start: 2020-05-22 | End: 2020-05-22 | Stop reason: HOSPADM

## 2020-05-22 RX ORDER — IOPAMIDOL 408 MG/ML
20 INJECTION, SOLUTION INTRATHECAL ONCE
Status: COMPLETED | OUTPATIENT
Start: 2020-05-22 | End: 2020-05-22

## 2020-05-22 RX ORDER — DEXAMETHASONE SODIUM PHOSPHATE 10 MG/ML
20 INJECTION, SOLUTION INTRAMUSCULAR; INTRAVENOUS ONCE
Status: COMPLETED | OUTPATIENT
Start: 2020-05-22 | End: 2020-05-22

## 2020-05-22 RX ADMIN — LIDOCAINE HYDROCHLORIDE 5 ML: 10 INJECTION, SOLUTION EPIDURAL; INFILTRATION; INTRACAUDAL; PERINEURAL at 15:39

## 2020-05-22 RX ADMIN — IOPAMIDOL 1 ML: 408 INJECTION, SOLUTION INTRATHECAL at 15:39

## 2020-05-22 RX ADMIN — DEXAMETHASONE SODIUM PHOSPHATE 20 MG: 10 INJECTION, SOLUTION INTRAMUSCULAR; INTRAVENOUS at 15:40

## 2020-05-22 NOTE — DISCHARGE INSTRUCTIONS
Steroid Injection Discharge Instructions     After you go home:      You may resume your normal diet.    Care of Puncture Site:      If you have a bandaid on your puncture site, you may remove it the next morning    You may shower tomorrow    No bath tubs, whirlpools or swimming for at least 3 days     Activity:      You may go back to normal activity in 24 hours    You should let pain be your guide as to the extent of your activities    Maintain any activity limitations as ordered by your provider    Do NOT drive a vehicle if you develop numbness in your arm or leg    Medicines:      You may resume all medications    Resume your Warfarin/Coumadin at your regular dose today. Follow up with your provider to have your INR rechecked    Resume your Platelet Inhibitors and Aspirin tomorrow at your regular dose    For minor pain, you may take Acetaminophen (Tylenol) or Ibuprofen (Advil)    Pain:       You may experience increased or different pain over the next 24-48 hours    For the next 48 hrs - you may use ice packs for discomfort     Call your primary care doctor if:      You have severe pain that does not improve with pain medication    You have chills or a fever greater than 101 F (38 C)    The site is red, swollen, hot or tender    New problems with your bowel or bladder    Any questions or concerns    Other Instructions:      New numbness down your leg post injection is temporary and may last for up to 6 hours. You may need assistance with activity until your leg has normal sensation.    If you are diabetic, monitor your blood sugar closely. Contact the provider who manages your diabetes to help you control your blood sugar if needed.    For Your Information:      A steroid was injected to help decrease swelling and may help to reduce pain. It may take up to 7-10 days to obtain full results.    Some patients will get lasting relief from a single injection. Others may require up to 3 injections to get results. If  you have more than one steroid injection, they should be given 2 weeks apart.    Side effects of your steroid injection are mild and will go away in 2-3 days  - Insomnia  - Heartburn  - Flushed face  - Water retention  - Increased appetite  - Increased blood sugar      If you have questions call:        [facility] @ [phone]

## 2020-05-22 NOTE — PROGRESS NOTES
Care Suites Admission Nursing Note    Patient Information  Name: Callum Haywood  Age: 59 year old  Reason for admission: Lumbar epidural steroid injection.  Care Suites arrival time: 1400    Patient Admission/Assessment   Pre-procedure assessment complete: Yes  If abnormal assessment/labs, provider notified: No.  NPO: N/A  Medications held per instructions/orders: N/A  Consent: deferred  If applicable, pregnancy test status: NA  Patient oriented to room: Yes  Education/questions answered: Yes.  Procedure explained to patient.  All questions and concerns addressed.  1500  AVS/Discharge instructions given and reviewed w/verbal understanding received.  Plan/other: Proceed as scheduled.    Discharge Planning  Accompanied by: self   Discharge name/phone number: Amandeep [phone]  Overnight post sedation caregiver: NA  Discharge location: home    Frannie Elaine RN     [id number]  Hand off report given to Grant Donaldson RN

## 2020-05-22 NOTE — PROGRESS NOTES
Care Suites Post Procedure Note    Patient Information  Name: Callum Haywood  Age: 59 year old    Post Procedure  Time patient returned to Care Suites: 1555  Concerns/abnormal assessment: no  If abnormal assessment, provider notified: N/A  Plan/Other: monitor    Jasiel Donaldson RN     Care Suites Discharge Nursing Note    Patient Information  Name: Callum Haywood  Age: 59 year old    Discharge Education:  Discharge instructions reviewed: Yes gloria Kathleen  Additional education/resources provided: jamaal  Patient/patient representative verbalizes understanding: Yes  Patient discharging on new medications: No  Medication education completed: N/A    Discharge Plans:   Discharge location: home  Discharge ride contacted: Yes  Approximate discharge time: 1625    Discharge Criteria:  Discharge criteria met and vital signs stable: Yes    Patient Belongs:  Patient belongings returned to patient: Yes    Jasiel Donaldson RN

## 2020-06-12 ENCOUNTER — VIRTUAL VISIT (OUTPATIENT)
Dept: ORTHOPEDICS | Facility: CLINIC | Age: 60
End: 2020-06-12
Payer: COMMERCIAL

## 2020-06-12 DIAGNOSIS — M54.41 CHRONIC BILATERAL LOW BACK PAIN WITH BILATERAL SCIATICA: Primary | ICD-10-CM

## 2020-06-12 DIAGNOSIS — M54.42 CHRONIC BILATERAL LOW BACK PAIN WITH BILATERAL SCIATICA: Primary | ICD-10-CM

## 2020-06-12 DIAGNOSIS — G89.29 CHRONIC BILATERAL LOW BACK PAIN WITH BILATERAL SCIATICA: Primary | ICD-10-CM

## 2020-06-12 RX ORDER — GABAPENTIN 300 MG/1
300 CAPSULE ORAL 2 TIMES DAILY
Qty: 60 CAPSULE | Refills: 0 | Status: SHIPPED | OUTPATIENT
Start: 2020-06-12 | End: 2020-08-19

## 2020-06-12 NOTE — PROGRESS NOTES
"Callum Haywood is a 59 year old male who is being evaluated via a billable telephone visit.      The patient has been notified of following:     \"This telephone visit will be conducted via a call between you and your physician/provider. We have found that certain health care needs can be provided without the need for a physical exam.  This service lets us provide the care you need with a short phone conversation.  If a prescription is necessary we can send it directly to your pharmacy.  If lab work is needed we can place an order for that and you can then stop by our lab to have the test done at a later time.    Telephone visits are billed at different rates depending on your insurance coverage. During this emergency period, for some insurers they may be billed the same as an in-person visit.  Please reach out to your insurance provider with any questions.    If during the course of the call the physician/provider feels a telephone visit is not appropriate, you will not be charged for this service.\"    Patient has given verbal consent for Telephone visit?  Yes    What phone number would you like to be contacted at? [phone]    How would you like to obtain your AVS? Cm    ESTABLISHED PATIENT FOLLOW-UP:  Follow Up (Injection follow-up)       HISTORY OF PRESENT ILLNESS  Mr. Haywood is a pleasant 59 year old year old male who presents to clinic today for follow-up of low back pain and recent epidural injection on 5/22/2020.    Interval History: Callum States that he continues to experience pain in his low back.  He does have improvement overall in the radiation into his posterior thighs bilaterally after injection.  He is somewhat happy with this.  He does note however continues to have pain with prolonged sitting or standing.  He has some improvement with positional changes.  No weakness.    Callum's son was also present at visit and did assist with questions for me.    Additional " medical/Social/Surgical histories reviewed in [facility] and updated as appropriate.    REVIEW OF SYSTEMS (6/14/2020)  CONSTITUTIONAL: Denies fever and weight loss  GASTROINTESTINAL: Denies abdominal pain, nausea, vomiting  MUSCULOSKELETAL: See HPI  SKIN: Denies any recent rash or lesion  NEUROLOGICAL: Denies numbness or focal weakness     ASSESSMENT & PLAN  Mr. Haywood is a 59 year old year old male who presents to clinic 3 weeks after epidural steroid injection directed at interlaminar L4-L5.  Overall he has had some improvement although it is not completely resolved his symptoms.  At this time he would like to continue to maximize conservative care. Will repeat YANIRA if pain persists in 2 weeks, will target L5-S1 levels.    -Start gabapentin and titrate up to twice daily  -He will continue home exercise program as he is not keen on formal physical therapy despite my recommendations  -Activity as tolerated  -YANIRA if no improvement with gabapentin; L5-S1   -Follow up in 1 month    It was a pleasure seeing Callum.    Moiz Hayes DO, CAQSM  Primary Care Sports Medicine      Phone call duration: 23 minutes

## 2020-06-12 NOTE — LETTER
"6/12/2020     RE: Callum Haywood  [address]  [place] [address]    Dear Colleague,    Thank you for referring your patient, Callum Haywood, to the [facility]. Please see a copy of my visit note below.    Callum Haywood is a 59 year old male who is being evaluated via a billable telephone visit.      The patient has been notified of following:     \"This telephone visit will be conducted via a call between you and your physician/provider. We have found that certain health care needs can be provided without the need for a physical exam.  This service lets us provide the care you need with a short phone conversation.  If a prescription is necessary we can send it directly to your pharmacy.  If lab work is needed we can place an order for that and you can then stop by our lab to have the test done at a later time.    Telephone visits are billed at different rates depending on your insurance coverage. During this emergency period, for some insurers they may be billed the same as an in-person visit.  Please reach out to your insurance provider with any questions.    If during the course of the call the physician/provider feels a telephone visit is not appropriate, you will not be charged for this service.\"    Patient has given verbal consent for Telephone visit?  Yes    What phone number would you like to be contacted at? [phone]    How would you like to obtain your AVS? Cm    ESTABLISHED PATIENT FOLLOW-UP:  Follow Up (Injection follow-up)       HISTORY OF PRESENT ILLNESS  Mr. Haywood is a pleasant 59 year old year old male who presents to clinic today for follow-up of low back pain and recent epidural injection on 5/22/2020.    Interval History: Callum States that he continues to experience pain in his low back.  He does have improvement overall in the radiation into his posterior thighs bilaterally after injection.  He is somewhat happy " [FreeTextEntry1] : 30 y/o M presents today for initial evaluation of right ankle pain, cramping and occasional numbness. patient with benign vascular exam, strong palpalbe pulses throughout legs, no swelling, no varicose veins. I suspect the patient's pain and symptoms are due to his weight and a previous fracture in his R ankle. No vascular intervention needed at this time. Follow up prn.  with this.  He does note however continues to have pain with prolonged sitting or standing.  He has some improvement with positional changes.  No weakness.    Callum's son was also present at visit and did assist with questions for me.    Additional medical/Social/Surgical histories reviewed in [facility] and updated as appropriate.    REVIEW OF SYSTEMS (6/14/2020)  CONSTITUTIONAL: Denies fever and weight loss  GASTROINTESTINAL: Denies abdominal pain, nausea, vomiting  MUSCULOSKELETAL: See HPI  SKIN: Denies any recent rash or lesion  NEUROLOGICAL: Denies numbness or focal weakness     ASSESSMENT & PLAN  Mr. Haywood is a 59 year old year old male who presents to clinic 3 weeks after epidural steroid injection directed at interlaminar L4-L5.  Overall he has had some improvement although it is not completely resolved his symptoms.  At this time he would like to continue to maximize conservative care. Will repeat YANIRA if pain persists in 2 weeks, will target L5-S1 levels.    -Start gabapentin and titrate up to twice daily  -He will continue home exercise program as he is not keen on formal physical therapy despite my recommendations  -Activity as tolerated  -YANIRA if no improvement with gabapentin; L5-S1   -Follow up in 1 month    It was a pleasure seeing Callum.    Moiz Hayes DO, CAQSM  Primary Care Sports Medicine    Phone call duration: 23 minutes

## 2020-06-18 DIAGNOSIS — Z11.59 ENCOUNTER FOR SCREENING FOR OTHER VIRAL DISEASES: Primary | ICD-10-CM

## 2020-07-01 ENCOUNTER — AMBULATORY - HEALTHEAST (OUTPATIENT)
Dept: FAMILY MEDICINE | Facility: CLINIC | Age: 60
End: 2020-07-01

## 2020-07-01 DIAGNOSIS — Z11.59 ENCOUNTER FOR SCREENING FOR OTHER VIRAL DISEASES: ICD-10-CM

## 2020-07-01 RX ORDER — NICOTINE POLACRILEX 4 MG
15-30 LOZENGE BUCCAL
Status: CANCELLED | OUTPATIENT
Start: 2020-07-01

## 2020-07-01 RX ORDER — DEXTROSE MONOHYDRATE 25 G/50ML
25-50 INJECTION, SOLUTION INTRAVENOUS
Status: CANCELLED | OUTPATIENT
Start: 2020-07-01

## 2020-07-02 ENCOUNTER — COMMUNICATION - HEALTHEAST (OUTPATIENT)
Dept: FAMILY MEDICINE | Facility: CLINIC | Age: 60
End: 2020-07-02

## 2020-07-02 ENCOUNTER — TELEPHONE (OUTPATIENT)
Dept: MEDSURG UNIT | Facility: CLINIC | Age: 60
End: 2020-07-02

## 2020-07-02 NOTE — TELEPHONE ENCOUNTER
Pre-Procedure Unconfirmed COVID Test     Step 1 COVID Screening  The patient was screened for COVID symptoms due to the inability to confirm the patient's COVID status (positive or negative test)    Patient reports the following:  Fever/Chills? No   Cough? No   Shortness of breath? No   New loss of taste or smell? No  Sore throat? No  Muscle or body aches? No  Headaches? No  Fatigue? No  Vomiting or diarrhea? No    Patient informed to contact the ordering provider if any of the symptoms develop prior to the procedure    Step 2 Screening Results (Skip if the patient is negative for symptoms)  If the patient is positive for new or worsening symptoms, contact the ordering provider to determine if the procedure is deemed necessary    Determine if the procedure can be re-scheduled when the patient is symptom free or has a negative COVID test     Step 3 Review Visitor Policy  Patient informed of the updated visitor policy   1 visitor allowed per patient   Visitor must screen negative for COVID symptoms   Visitor must wear a mask   Waiting rooms continue to be closed to visitors    Jessica Lora RN

## 2020-07-03 ENCOUNTER — HOSPITAL ENCOUNTER (OUTPATIENT)
Dept: GENERAL RADIOLOGY | Facility: CLINIC | Age: 60
End: 2020-07-03
Attending: FAMILY MEDICINE
Payer: COMMERCIAL

## 2020-07-03 ENCOUNTER — HOSPITAL ENCOUNTER (OUTPATIENT)
Facility: CLINIC | Age: 60
Discharge: HOME | End: 2020-07-03
Admitting: PHYSICIAN ASSISTANT
Payer: COMMERCIAL

## 2020-07-03 VITALS
TEMPERATURE: 97.6 F | DIASTOLIC BLOOD PRESSURE: 73 MMHG | OXYGEN SATURATION: 97 % | RESPIRATION RATE: 18 BRPM | SYSTOLIC BLOOD PRESSURE: 123 MMHG

## 2020-07-03 DIAGNOSIS — M54.42 CHRONIC BILATERAL LOW BACK PAIN WITH BILATERAL SCIATICA: ICD-10-CM

## 2020-07-03 DIAGNOSIS — M54.41 CHRONIC BILATERAL LOW BACK PAIN WITH BILATERAL SCIATICA: ICD-10-CM

## 2020-07-03 DIAGNOSIS — G89.29 CHRONIC BILATERAL LOW BACK PAIN WITH BILATERAL SCIATICA: ICD-10-CM

## 2020-07-03 PROCEDURE — 25000125 ZZHC RX 250: Performed by: FAMILY MEDICINE

## 2020-07-03 PROCEDURE — 25500064 ZZH RX 255 OP 636: Performed by: FAMILY MEDICINE

## 2020-07-03 PROCEDURE — 40000863 ZZH STATISTIC RADIOLOGY XRAY, US, CT, MAR, NM

## 2020-07-03 PROCEDURE — 62323 NJX INTERLAMINAR LMBR/SAC: CPT

## 2020-07-03 PROCEDURE — 25000128 H RX IP 250 OP 636: Performed by: FAMILY MEDICINE

## 2020-07-03 RX ORDER — NICOTINE POLACRILEX 4 MG
15-30 LOZENGE BUCCAL
Status: DISCONTINUED | OUTPATIENT
Start: 2020-07-03 | End: 2020-07-03 | Stop reason: HOSPADM

## 2020-07-03 RX ORDER — DEXTROSE MONOHYDRATE 25 G/50ML
25-50 INJECTION, SOLUTION INTRAVENOUS
Status: DISCONTINUED | OUTPATIENT
Start: 2020-07-03 | End: 2020-07-03 | Stop reason: HOSPADM

## 2020-07-03 RX ORDER — BETAMETHASONE SODIUM PHOSPHATE AND BETAMETHASONE ACETATE 3; 3 MG/ML; MG/ML
6 INJECTION, SUSPENSION INTRA-ARTICULAR; INTRALESIONAL; INTRAMUSCULAR; SOFT TISSUE ONCE
Status: COMPLETED | OUTPATIENT
Start: 2020-07-03 | End: 2020-07-03

## 2020-07-03 RX ORDER — IOPAMIDOL 408 MG/ML
10 INJECTION, SOLUTION INTRATHECAL ONCE
Status: COMPLETED | OUTPATIENT
Start: 2020-07-03 | End: 2020-07-03

## 2020-07-03 RX ADMIN — BETAMETHASONE SODIUM PHOSPHATE AND BETAMETHASONE ACETATE 18 MG: 3; 3 INJECTION, SUSPENSION INTRA-ARTICULAR; INTRALESIONAL; INTRAMUSCULAR at 14:53

## 2020-07-03 RX ADMIN — IOPAMIDOL 1 ML: 408 INJECTION, SOLUTION INTRATHECAL at 14:53

## 2020-07-03 RX ADMIN — LIDOCAINE HYDROCHLORIDE 4 ML: 10 INJECTION, SOLUTION EPIDURAL; INFILTRATION; INTRACAUDAL; PERINEURAL at 14:34

## 2020-07-03 NOTE — DISCHARGE SUMMARY
Care Suites Discharge Nursing Note    Patient Information  Name: Callum Haywood  Age: 59 year old    Discharge Education:  Discharge instructions reviewed: Yes  Additional education/resources provided: AVS  Patient/patient representative verbalizes understanding: Yes  Patient discharging on new medications: No  Medication education completed: N/A    Discharge Plans:   Discharge location: home  Discharge ride contacted: Yes  Approximate discharge time: 1515    Discharge Criteria:  Discharge criteria met and vital signs stable: Yes    Patient Belongs:  Patient belongings returned to patient: Yes    Felipe Farfan RN

## 2020-07-03 NOTE — DISCHARGE INSTRUCTIONS
Steroid Injection Discharge Instructions     After you go home:      You may resume your normal diet.    Care of Puncture Site:      If you have a bandaid on your puncture site, you may remove it the next morning    You may shower tomorrow    No bath tubs, whirlpools or swimming for at least 3 days     Activity:      You may go back to normal activity in 24 hours    You should let pain be your guide as to the extent of your activities    Maintain any activity limitations as ordered by your provider    Do NOT drive a vehicle if you develop numbness in your arm or leg    Medicines:      You may resume all medications    Resume your Warfarin/Coumadin at your regular dose today. Follow up with your provider to have your INR rechecked    Resume your Platelet Inhibitors and Aspirin tomorrow at your regular dose    For minor pain, you may take Acetaminophen (Tylenol) or Ibuprofen (Advil)    Pain:       You may experience increased or different pain over the next 24-48 hours    For the next 48 hrs - you may use ice packs for discomfort     Call your primary care doctor if:      You have severe pain that does not improve with pain medication    You have chills or a fever greater than 101 F (38 C)    The site is red, swollen, hot or tender    New problems with your bowel or bladder    Any questions or concerns    Other Instructions:      New numbness down your leg post injection is temporary and may last for up to 6 hours. You may need assistance with activity until your leg has normal sensation.    If you are diabetic, monitor your blood sugar closely. Contact the provider who manages your diabetes to help you control your blood sugar if needed.    For Your Information:      A steroid was injected to help decrease swelling and may help to reduce pain. It may take up to 7-10 days to obtain full results.    Some patients will get lasting relief from a single injection. Others may require up to 3 injections to get results. If  you have more than one steroid injection, they should be given 2 weeks apart.    Side effects of your steroid injection are mild and will go away in 2-3 days  - Insomnia  - Heartburn  - Flushed face  - Water retention  - Increased appetite  - Increased blood sugar      If you have questions call:        [facility] Radiology Dept @ [phone]      The provider who performed your procedure was _________________.

## 2020-07-03 NOTE — PROGRESS NOTES
Care Suites Admission Nursing Note    Patient Information  Name: Callum Haywood  Age: 59 year old  Reason for admission: epidural  Care Suites arrival time: 1:45 pm    Patient Admission/Assessment   Pre-procedure assessment complete: Yes  If abnormal assessment/labs, provider notified: N/A  NPO: Yes  Medications held per instructions/orders: N/A  Consent: deferred  If applicable, pregnancy test status: deferred  Patient oriented to room: Yes  Education/questions answered: Yes  Plan/other: epi      Discharge Planning  Accompanied by: marysol Grant  Discharge name/phone number:   Overnight post sedation caregiver:   Discharge location: home    Felipe Farfan RN

## 2020-07-03 NOTE — PROGRESS NOTES
PATIENT/VISITOR WELLNESS SCREENING    Step 1 Patient Screening    1. In the last month, have you been in contact with someone who was confirmed or suspected to have Coronavirus/COVID-19? No    2. Do you have the following symptoms?  Fever/Chills? No   Cough? No   Shortness of breath? No   New loss of taste or smell? No  Sore throat? No  Muscle or body aches? No  Headaches? No  Fatigue? No  Vomiting or diarrhea? No    Step 2 Visitor Screening    1. Name of Visitor (1 visitor per patient): Rudy -marysol    2. In the last month, have you been in contact with someone who was confirmed or suspected to have Coronavirus/COVID-19? No    3. Do you have the following symptoms?  Fever/Chills? No   Cough? No   Shortness of breath? No   Skin rash? No   Loss of taste or smell? No  Sore throat? No  Runny or stuffy nose? No  Muscle or body aches? No  Headaches? No  Fatigue? No  Vomiting or diarrhea? No    If the visitor has positive symptoms, notify supervisor/manger  Per policy, the visitor will need to leave the facility     Step 3 Refer to logic grid below for actions    NO SYMPTOM(S)    ACTIONS:  1. Standard rooming process  2. Provider to assess per normal protocol  3. Implement precautions as needed and per guidelines     POSITIVE SYMPTOM(S)  If positive for ANY of the following symptoms: fever, cough, shortness of breath, rash    ACTION:  1. Continue to have the patient wear a mask   2. Room patient as soon as possible  3. Don appropriate PPE when entering room  4. Provider evaluation

## 2020-07-03 NOTE — PROGRESS NOTES
RADIOLOGY PROCEDURE NOTE  Patient name: Callum Haywood  MRN: [id number]  : 1960    Pre-procedure diagnosis: Low back pain  Post-procedure diagnosis: Same    Procedure Date/Time: July 3, 2020  2:46 PM  Procedure: L5-S1 Interlaminar YANIRA  Estimated blood loss: None  Specimen(s) collected with description: none  The patient tolerated the procedure well with no immediate complications.  Significant findings:none    See imaging dictation for procedural details.    Provider name: Dangelo Rausch PA-C  Assistant(s):None

## 2020-08-19 ENCOUNTER — OFFICE VISIT (OUTPATIENT)
Dept: FAMILY MEDICINE | Facility: CLINIC | Age: 60
End: 2020-08-19
Payer: COMMERCIAL

## 2020-08-19 VITALS
RESPIRATION RATE: 16 BRPM | OXYGEN SATURATION: 99 % | BODY MASS INDEX: 27.96 KG/M2 | WEIGHT: 167.8 LBS | SYSTOLIC BLOOD PRESSURE: 121 MMHG | DIASTOLIC BLOOD PRESSURE: 71 MMHG | HEIGHT: 65 IN | TEMPERATURE: 98.3 F | HEART RATE: 65 BPM

## 2020-08-19 DIAGNOSIS — M54.42 CHRONIC BILATERAL LOW BACK PAIN WITH BILATERAL SCIATICA: Primary | ICD-10-CM

## 2020-08-19 DIAGNOSIS — Z23 NEED FOR SHINGLES VACCINE: ICD-10-CM

## 2020-08-19 DIAGNOSIS — B35.3 TINEA PEDIS OF BOTH FEET: ICD-10-CM

## 2020-08-19 DIAGNOSIS — G89.29 CHRONIC BILATERAL LOW BACK PAIN WITH BILATERAL SCIATICA: Primary | ICD-10-CM

## 2020-08-19 DIAGNOSIS — M54.41 CHRONIC BILATERAL LOW BACK PAIN WITH BILATERAL SCIATICA: Primary | ICD-10-CM

## 2020-08-19 PROCEDURE — 99214 OFFICE O/P EST MOD 30 MIN: CPT | Mod: 25 | Performed by: NURSE PRACTITIONER

## 2020-08-19 PROCEDURE — 90750 HZV VACC RECOMBINANT IM: CPT | Performed by: NURSE PRACTITIONER

## 2020-08-19 PROCEDURE — 90471 IMMUNIZATION ADMIN: CPT | Performed by: NURSE PRACTITIONER

## 2020-08-19 ASSESSMENT — MIFFLIN-ST. JEOR: SCORE: 1516.98

## 2020-08-19 NOTE — PROGRESS NOTES
SUBJECTIVE:   CC: Callum Haywood is an 59 year old male who presents for preventative health visit.     Healthy Habits:    Getting at least 3 servings of Calcium per day:  Yes    Bi-annual eye exam:  Yes    Dental care twice a year:  NO    Sleep apnea or symptoms of sleep apnea:  None    Diet:  Vegetarian/vegan    Frequency of exercise:  4-5 days/week    Duration of exercise:  45-60 minutes    Taking medications regularly:  No    Barriers to taking medications:  Not applicable    Medication side effects:  Not applicable    PHQ-2 Total Score:    Additional concerns today:: left foot pain X 4 weeks           His last physical was in January, so this visit will be changed to a symptom visit.    He has had pain - described as pins and needles - left medial foot/heel for the past month.  It is worse with running or jumping, but seems to be present all the time.  He has noticed peeling skin around the perimeter of both of his feet.    He continues to have low back pain with bilateral sciatica.  He has had two ESIs, most recently in June.  He has had only minimal relief from the second injection.  He was started on Gabapentin, but has not noticed any improvement, so he stopped it.     Today's PHQ-2 Score:   PHQ-2 ( 1999 [employer]) 1/22/2020   Q1: Little interest or pleasure in doing things 1   Q2: Feeling down, depressed or hopeless 1   PHQ-2 Score 2   Q1: Little interest or pleasure in doing things Several days   Q2: Feeling down, depressed or hopeless Several days   PHQ-2 Score 2       Abuse: Current or Past(Physical, Sexual or Emotional)- No  Do you feel safe in your environment? Yes        Social History     Tobacco Use     Smoking status: Never Smoker     Smokeless tobacco: Never Used   Substance Use Topics     Alcohol use: Yes     Comment: 3 times a month          Alcohol Use 1/22/2020   Prescreen: >3 drinks/day or >7 drinks/week? No   Prescreen: >3 drinks/day or >7 drinks/week? -       Last PSA:   PSA   Date  "Value Ref Range Status   01/22/2020 0.67 0 - 4 ug/L Final     Comment:     Assay Method:  Chemiluminescence using Siemens Vista analyzer       Reviewed orders with patient. Reviewed health maintenance and updated orders accordingly -       Reviewed and updated as needed this visit by clinical staff  Tobacco  Allergies  Meds  Med Hx  Surg Hx  Fam Hx  Soc Hx        Reviewed and updated as needed this visit by Provider            Review of Systems  CONSTITUTIONAL: NEGATIVE for fever, chills, change in weight  INTEGUMENTARY/SKIN: NEGATIVE for worrisome rashes, moles or lesions  EYES: NEGATIVE for vision changes or irritation  ENT: NEGATIVE for ear, mouth and throat problems  RESP: NEGATIVE for significant cough or SOB  CV: NEGATIVE for chest pain, palpitations or peripheral edema  GI: NEGATIVE for nausea, abdominal pain, heartburn, or change in bowel habits   male: negative for dysuria, hematuria, decreased urinary stream, erectile dysfunction, urethral discharge  MUSCULOSKELETAL:see HPI  NEURO: see HPI  PSYCHIATRIC: NEGATIVE for changes in mood or affect    OBJECTIVE:   /71   Pulse 65   Temp 98.3  F (36.8  C) (Oral)   Resp 16   Ht 1.657 m (5' 5.25\")   Wt 76.1 kg (167 lb 12.8 oz)   SpO2 99%   BMI 27.71 kg/m      Physical Exam  GENERAL: healthy, alert and no distress  MS: no tenderness of the left foot with palpation  SKIN: scaling in a moccasin distribution bilateral feet  NEURO: Normal strength and tone, mentation intact and speech normal        ASSESSMENT/PLAN:   (M54.42,  M54.41,  G89.29) Chronic bilateral low back pain with bilateral sciatica  (primary encounter diagnosis)  Comment:   Plan: Orthopedic & Spine  Referral        Discussed following up with Dr. Hayes to discuss next steps.     (B35.3) Tinea pedis of both feet  Comment:   Plan:   Patient Instructions   Lamisil cream - apply to affected areas on feet twice daily for one week past clearing.          (Z23) Need for shingles " vaccine  Comment:   Plan: ZOSTER VACCINE RECOMBINANT ADJUVANTED IM NJX        Risks and benefits of this immunization were discussed with the patient including potential side effects.         Stephie Lewis NP  [facility]

## 2020-08-19 NOTE — PATIENT INSTRUCTIONS
Lamisil cream - apply to affected areas on feet twice daily for one week past clearing.     Call and schedule a follow up visit with Dr. Hayes.

## 2020-08-19 NOTE — LETTER
August 19, 2020      Callum GARCIA Yobany  [address]  [place] [address]        To Whom It May Concern,      Callum had a shingles vaccine today, may have a sore arm that could affect his physical work for today.           Sincerely,        Stephie Lewis, DNP, CNP

## 2020-08-19 NOTE — NURSING NOTE
Prior to immunization administration, verified patients identity using patient s name and date of birth. Please see Immunization Activity for additional information.     Screening Questionnaire for Adult Immunization    Are you sick today?   No   Do you have allergies to medications, food, a vaccine component or latex?   No   Have you ever had a serious reaction after receiving a vaccination?   No   Do you have a long-term health problem with heart, lung, kidney, or metabolic disease (e.g., diabetes), asthma, a blood disorder, no spleen, complement component deficiency, a cochlear implant, or a spinal fluid leak?  Are you on long-term aspirin therapy?   No   Do you have cancer, leukemia, HIV/AIDS, or any other immune system problem?   No   Do you have a parent, brother, or sister with an immune system problem?   No   In the past 3 months, have you taken medications that affect  your immune system, such as prednisone, other steroids, or anticancer drugs; drugs for the treatment of rheumatoid arthritis, Crohn s disease, or psoriasis; or have you had radiation treatments?   No   Have you had a seizure, or a brain or other nervous system problem?   No   During the past year, have you received a transfusion of blood or blood    products, or been given immune (gamma) globulin or antiviral drug?   No   For women: Are you pregnant or is there a chance you could become       pregnant during the next month?   No   Have you received any vaccinations in the past 4 weeks?   No     Immunization questionnaire answers were all negative.        Per orders of Dr. Lewis, injection of shingrx given by Elba Vega MA. Patient instructed to remain in clinic for 15 minutes afterwards, and to report any adverse reaction to me immediately.       Screening performed by Elba Vega MA on 8/19/2020 at 12:02 PM.

## 2020-08-20 ENCOUNTER — TELEPHONE (OUTPATIENT)
Dept: ORTHOPEDICS | Facility: CLINIC | Age: 60
End: 2020-08-20

## 2020-08-20 NOTE — TELEPHONE ENCOUNTER
SYLVESTER with walk  In clinic number.  I informed Callum that we do not do YANIRA in clinic. If that is the option he would like to pursue, then we can schedule a virtual visit in the upcoming future to discuss this. If this is something that he would like to do, this may be the easiest and quickest way for Callum to schedule his YANIRA.     - Warren GRAY ATC

## 2020-08-20 NOTE — TELEPHONE ENCOUNTER
[facility] Call Center    Phone Message    May a detailed message be left on voicemail: yes     Reason for Call: Other: Please call Pt. back, Pt. scheduled for Walk In Ortho with Dr. Hayes on 8/29/2020 , Pt. wants to know if he can have a low back injection at time of visit.  Pt. states had an injection in May and June. Pt. is taking off work for the appointment. Please call Pt.back at [phone].     Action Taken: Message routed to:  [facility] Ortho     Travel Screening: Not Applicable

## 2020-08-29 ENCOUNTER — OFFICE VISIT (OUTPATIENT)
Dept: ORTHOPEDICS | Facility: CLINIC | Age: 60
End: 2020-08-29
Attending: NURSE PRACTITIONER
Payer: COMMERCIAL

## 2020-08-29 VITALS — WEIGHT: 172 LBS | BODY MASS INDEX: 28.66 KG/M2 | RESPIRATION RATE: 16 BRPM | HEIGHT: 65 IN

## 2020-08-29 DIAGNOSIS — M54.42 CHRONIC BILATERAL LOW BACK PAIN WITH BILATERAL SCIATICA: ICD-10-CM

## 2020-08-29 DIAGNOSIS — M72.2 PLANTAR FASCIITIS, LEFT: Primary | ICD-10-CM

## 2020-08-29 DIAGNOSIS — G89.29 CHRONIC BILATERAL LOW BACK PAIN WITH BILATERAL SCIATICA: ICD-10-CM

## 2020-08-29 DIAGNOSIS — M54.41 CHRONIC BILATERAL LOW BACK PAIN WITH BILATERAL SCIATICA: ICD-10-CM

## 2020-08-29 RX ORDER — DICLOFENAC SODIUM 75 MG/1
75 TABLET, DELAYED RELEASE ORAL 2 TIMES DAILY
Qty: 20 TABLET | Refills: 0 | Status: SHIPPED | OUTPATIENT
Start: 2020-08-29 | End: 2021-02-22

## 2020-08-29 ASSESSMENT — MIFFLIN-ST. JEOR: SCORE: 1531.03

## 2020-08-29 NOTE — PATIENT INSTRUCTIONS
Call [phone] to schedule PT       Plantar Fasciitis Education    WHAT IS PLANTAR FASCIITIS?    Plantar fasciitis is painful irritation of the tissue on the bottom of your foot between the ball of the foot and the heel. This tough tissue that supports the arch of your foot is called fascia.    WHAT IS THE CAUSE?    Plantar fasciitis can be caused by a number of things, like:    A lot of running, jumping, walking, or stair-climbing  Wearing high heels for long periods of time  Gaining weight  If you are a runner, you may get plantar fasciitis when you start running further during each workout or if you run more often. It can also happen if you start running on a different surface or in different terrain, or if your shoes are worn out and don't give enough cushioning for your heels.    If you wear high-heeled shoes, including western-style boots, the fascia can get shorter. You may then have pain when you stretch the fascia. This painful stretching might happen, for example, when you walk barefoot after getting out of bed in the morning.    If you gain weight, you may put more stress on your feet, especially if you walk a lot or  shoes with poor cushioning.    If the arches of your foot are unusually high or low, you are more likely to develop plantar fasciitis than if your arches are normal.    WHAT ARE THE SYMPTOMS?    The main symptom of plantar fasciitis is heel pain when you walk. You may also feel pain when you stand and possibly even when you are resting. This pain typically occurs first thing in the morning when you get out of bed and put your foot flat on the floor, stretching the fascia. The pain may lessen after you have been up for a while and walking, but then the pain may come back after you have been resting.    You may not have any pain when you are sleeping because the position of your feet during rest allows the fascia to shorten and relax.    HOW IS IT DIAGNOSED?    Your healthcare  provider will ask about your symptoms, activities, and medical history and examine your foot and ankle. You may have an X-ray of your heel.    HOW IS IT TREATED?    Give your painful heel lots of rest. You will need to change or stop doing the activities that cause pain until your foot has healed. You may need to stay completely off your foot for several days when the pain is severe.    Your healthcare provider may recommend stretching and strengthening exercises to help you heal. Exercises to make the foot stronger and to stretch the fascia are very important.    Your healthcare provider may recommend shoe inserts called arch supports or orthotics. You can buy them at a pharmacy or athletic shoe store or they can be custom made. Make sure the arch supports are firm. If you can easily bend them in half, they may be too flexible. These supports can be particularly helpful if you have flat feet or high arches. Wearing athletic shoes or heel cushions in both shoes may also help. Cushions are most helpful if you are overweight or an older adult.    A night splint may help keep the plantar fascia stretched while you are sleeping.    Your provider may give you a shot of steroid medicine. Your healthcare provider may have other treatments to suggest.    With treatment, plantar fasciitis may take up to several months to heal. You may find that the pain comes and goes. Talk to your healthcare provider if you do not feel improvement with treatment.    HOW CAN I TAKE CARE OF MYSELF?    To help relieve pain:    Rest your heel on an ice pack, gel pack, or package of frozen vegetables wrapped in a cloth every 3 to 4 hours for up to 20 minutes at a time.  Keep your foot up on a pillow when you sit or lie down.  Take nonprescription pain medicine, such as acetaminophen, ibuprofen, or naproxen. Read the label and take as directed. Nonsteroidal anti-inflammatory medicines (NSAIDs), such as ibuprofen or naproxen, may cause stomach  bleeding and other problems. These risks increase with age. Unless recommended by your healthcare provider, do not take an NSAID for more than 10 days.  Follow your healthcare provider's instructions, including any exercises recommended by your provider. Ask your provider:    How and when you will hear your test results  How long it will take to recover  What activities you should avoid, including how much you can lift, and when you can return to your normal activities  How to take care of yourself at home  What symptoms or problems you should watch for and what to do if you have them  Make sure you know when you should come back for a checkup.    HOW CAN I HELP PREVENT PLANTAR FASCIITIS?    The best way to prevent plantar fasciitis is to wear well-made shoes that fit your feet and give good arch support and cushioning. This is especially important if you exercise or walk a lot or stand for a long time on hard surfaces. Get new athletic shoes before your old shoes stop supporting and cushioning your feet.    You should also:    Avoid repeated jarring to the heel.  Keep a healthy weight.  Do leg and foot stretching exercises regularly.    Developed by [employer].  Published by [employer].  Copyright  2014 [employer] and/or one of its subsidiaries. All rights reserved.      Plantar Fasciitis Exercises:    You may start strengthening the muscles of your hip and stretching the muscles of your foot right away.    Prone hip extension: Lie on your stomach with your legs straight out behind you. Fold your arms under your head and rest your head on your arms. Draw your belly button in towards your spine and tighten your abdominal muscles. Tighten the buttocks and thigh muscles of the leg on your injured side and lift the leg off the floor about 8 inches. Keep your leg straight. Hold for 5 seconds. Then lower your leg and relax. Do 2 sets of 15.    Side-lying leg lift: Lie on your uninjured side. Tighten the front  thigh muscles on your injured leg and lift that leg 8 to 10 inches (20 to 25 centimeters) away from the other leg. Keep the leg straight and lower it slowly. Do 2 sets of 15.    *Frozen can roll: Roll your bare injured foot back and forth from your heel to your mid-arch over a frozen juice can. Repeat for 3 to 5 minutes. This exercise is particularly helpful if it is done first thing in the morning.    *Towel stretch: Sit on a hard surface with your injured leg stretched out in front of you. Loop a towel around your toes and the ball of your foot and pull the towel toward your body keeping your leg straight. Hold this position for 15 to 30 seconds and then relax. Repeat 3 times.    *Standing calf stretch: Stand facing a wall with your hands on the wall at about eye level. Keep your injured leg back with your heel on the floor. Keep the other leg forward with the knee bent. Turn your back foot slightly inward (as if you were pigeon-toed). Slowly lean into the wall until you feel a stretch in the back of your calf. Hold the stretch for 15 to 30 seconds. Return to the starting position. Repeat 3 times. Do this exercise several times each day.    *Seated plantar fascia stretch: Sit in a chair and cross the injured foot over the knee of your other leg. Place your fingers over the base of your toes and pull them back toward your shin until you feel a comfortable stretch in the arch of your foot. Hold 15 seconds and repeat 3 times.    *Plantar fascia massage: Sit in a chair and cross the injured foot over the knee of your other leg. Place your fingers over the base of the toes of your injured foot and pull your toes toward your shin until you feel a stretch in the arch of your foot. With your other hand, massage the bottom of your foot, moving from the heel toward your toes. Do this for 3 to 5 minutes. Start gently. Press harder on the bottom of your foot as you become able to tolerate more pressure.    Achilles stretch:  Stand with the ball of one foot on a stair. Reach for the step below with your heel until you feel a stretch in the arch of your foot. Hold this position for 15 to 30 seconds and then relax. Repeat 3 times.  After you have stretched the bottom muscles of your foot, you can do these exercises to start strengthening the muscles of your foot.    Towel pickup: With your heel on the ground,  a towel with your toes. Release. Repeat 10 to 20 times. When this gets easy, add more resistance by placing a book or small weight on the towel.    Balance and reach exercises: Stand next to a chair with your injured leg farther from the chair. The chair will provide support if you need it. Stand on the foot of your injured leg and bend your knee slightly. Try to raise the arch of this foot while keeping your big toe on the floor. Keep your foot in this position.  With the hand that is farther away from the chair, reach forward in front of you by bending at the waist. Avoid bending your knee any more as you do this. Repeat this 15 times. To make the exercise more challenging, reach farther in front of you. Do 2 sets of 15.    While keeping your arch raised, reach the hand that is farther away from the chair across your body toward the chair. The farther you reach, the more challenging the exercise. Do 2 sets of 15.    Heel raise: Stand behind a chair or counter with both feet flat on the floor. Using the chair or counter as a support, rise up onto your toes and hold for 5 seconds. Then slowly lower yourself down without holding onto the support. (It's OK to keep holding onto the support if you need to.) When this exercise becomes less painful, try doing this exercise while you are standing on the injured leg only. Repeat 15 times. Do 2 sets of 15. Rest 30 seconds between sets.    Developed by [employer].  Published by [employer].  Copyright  2014 [employer] and/or one of its subsidiaries. All rights  reserved.

## 2020-08-29 NOTE — LETTER
8/29/2020         RE: Callum Haywood  [address]  [place] [address]        Dear Colleague,    Thank you for referring your patient, Callum Haywood, to the [facility] SPOR[facility] AND ORTHOPAEDIC WALK IN CLINIC. Please see a copy of my visit note below.    ESTABLISHED PATIENT FOLLOW-UP:  Consult (back pain )       HISTORY OF PRESENT ILLNESS  Mr. Haywood is a pleasant 60 year old year old male who presents to clinic today for follow-up of low back pain with radiculopathy.    Date of onset:Chronic  Date last seen: Virtual 6/12/20  Following Therapeutic Plan: Yes  Pain: Mildly improved  Function: Mildly improved  Interval History: Mild improvement in intensity of symptoms in low back but remains debilitating.  Pain of bilateral posterior thighs persists although less.  Left foot pain increasing over last month. Sharp near heel. Worse in AM and with walking. Improved with rest. No acute trauma to region.    Treatment to date:  YANIRA L4-L5 interlaminar 5/22/20  YANIRA L5-S1 interlaminar 7/3/20  Gabapentin - not tolerating  Tizanidine  Physical therapy LBP 2015    Additional medical/Social/Surgical histories reviewed in [facility] and updated as appropriate.    REVIEW OF SYSTEMS (8/29/2020)  CONSTITUTIONAL: Denies fever and weight loss  GASTROINTESTINAL: Denies abdominal pain, nausea, vomiting  MUSCULOSKELETAL: See HPI  SKIN: Denies any recent rash or lesion  NEUROLOGICAL: Denies numbness or focal weakness     PHYSICAL EXAM  There were no vitals taken for this visit.    General  - normal appearance, in no obvious distress  CV  - normal peripheral perfusion  Pulm  - normal respiratory pattern, non-labored  Musculoskeletal - lumbar spine  - stance: normal gait without limp, no obvious leg length discrepancy, normal heel and toe walk  - inspection: normal bone and joint alignment, no obvious scoliosis  - palpation: no paravertebral or bony tenderness  - ROM: flexion and rotation, left sidebending  exacerbates pain, normal unrestricted flexion, extension, sidebending, rotation  - strength: lower extremities 5/5 in all planes  - special tests:  (-) straight leg raise  (-) slump test  Neuro  - patellar and Achilles DTRs 2+ bilaterally, no lower extremity sensory deficit throughout L5 distribution, grossly normal coordination, normal muscle tone  Musculoskeletal - Left foot  - stance: normal gait without limp, normal stance without excessive pronation, normal heel inversion with standing heel raise, no obvious leg length discrepancy, normal heel and toe walk  - inspection: no swelling or effusion,  normal bone and joint alignment, no obvious deformity  - palpation: tender at the medial calcaneal tubercle  - ROM: normal active and passive ROM of great and lesser toes, no pain with MT translation  - strength: 5/5 in all planes  Skin  - no ecchymosis, erythema, warmth, or induration, no obvious rash  Psych  - interactive, appropriate, normal mood and affect     ASSESSMENT & PLAN  Mr. Haywood is a 60 year old year old male who presents to clinic today for follow-up after epidural steroid injection and discussion of ongoing low back pain with radiculopathy secondary to degenerative disc disease.    Diagnosis:  1.  Plantar fasciitis of left foot  2.  Lumbar degenerative disc disease with radiculopathy affecting bilateral lower extremities    -Although he has had physical therapy, it was last in 2015 and I do think he would benefit greatly from a renewed course given changes in his symptoms.  -Start Voltaren twice daily for the next 10 days  -Hold off on any repeat epidural steroid injections at this time as they have not been helpful  -Consideration for referral to spine surgery if no improvement is noted from physical therapy course  -Follow up 4 to 6 weeks    It was a pleasure seeing Callum.    I spent a total of 22 minutes face-to-face with Callum Haywood during today's office visit.  Over 50% of this time  was spent counseling the patient and/or coordinating care regarding ongoing LBP, discussion of plantar fasciitis and demonstrating exercises.  See note for details.      Moiz Hayes DO, CAQSM  Primary Care Sports Medicine

## 2020-08-29 NOTE — PROGRESS NOTES
ESTABLISHED PATIENT FOLLOW-UP:  Consult (back pain )       HISTORY OF PRESENT ILLNESS  Mr. Haywood is a pleasant 60 year old year old male who presents to clinic today for follow-up of low back pain with radiculopathy.    Date of onset:Chronic  Date last seen: Virtual 6/12/20  Following Therapeutic Plan: Yes  Pain: Mildly improved  Function: Mildly improved  Interval History: Mild improvement in intensity of symptoms in low back but remains debilitating.  Pain of bilateral posterior thighs persists although less.  Left foot pain increasing over last month. Sharp near heel. Worse in AM and with walking. Improved with rest. No acute trauma to region.    Treatment to date:  YANIRA L4-L5 interlaminar 5/22/20  YANIRA L5-S1 interlaminar 7/3/20  Gabapentin - not tolerating  Tizanidine  Physical therapy LBP 2015    Additional medical/Social/Surgical histories [facility] in [facility] and updated as appropriate.    REVIEW OF SYSTEMS (8/29/2020)  CONSTITUTIONAL: Denies fever and weight loss  GASTROINTESTINAL: Denies abdominal pain, nausea, vomiting  MUSCULOSKELETAL: See HPI  SKIN: Denies any recent rash or lesion  NEUROLOGICAL: Denies numbness or focal weakness     PHYSICAL EXAM  There were no vitals taken for this visit.    General  - normal appearance, in no obvious distress  CV  - normal peripheral perfusion  Pulm  - normal respiratory pattern, non-labored  Musculoskeletal - lumbar spine  - stance: normal gait without limp, no obvious leg length discrepancy, normal heel and toe walk  - inspection: normal bone and joint alignment, no obvious scoliosis  - palpation: no paravertebral or bony tenderness  - ROM: flexion and rotation, left sidebending exacerbates pain, normal unrestricted flexion, extension, sidebending, rotation  - strength: lower extremities 5/5 in all planes  - special tests:  (-) straight leg raise  (-) slump test  Neuro  - patellar and Achilles DTRs 2+ bilaterally, no lower extremity sensory deficit throughout L5  distribution, grossly normal coordination, normal muscle tone  Musculoskeletal - Left foot  - stance: normal gait without limp, normal stance without excessive pronation, normal heel inversion with standing heel raise, no obvious leg length discrepancy, normal heel and toe walk  - inspection: no swelling or effusion,  normal bone and joint alignment, no obvious deformity  - palpation: tender at the medial calcaneal tubercle  - ROM: normal active and passive ROM of great and lesser toes, no pain with MT translation  - strength: 5/5 in all planes  Skin  - no ecchymosis, erythema, warmth, or induration, no obvious rash  Psych  - interactive, appropriate, normal mood and affect     ASSESSMENT & PLAN  Mr. Haywood is a 60 year old year old male who presents to clinic today for follow-up after epidural steroid injection and discussion of ongoing low back pain with radiculopathy secondary to degenerative disc disease.    Diagnosis:  1.  Plantar fasciitis of left foot  2.  Lumbar degenerative disc disease with radiculopathy affecting bilateral lower extremities    -Although he has had physical therapy, it was last in 2015 and I do think he would benefit greatly from a renewed course given changes in his symptoms.  -Start Voltaren twice daily for the next 10 days  -Hold off on any repeat epidural steroid injections at this time as they have not been helpful  -Consideration for referral to spine surgery if no improvement is noted from physical therapy course  -Follow up 4 to 6 weeks    It was a pleasure seeing Callum.    I spent a total of 22 minutes face-to-face with Callum Haywood during today's office visit.  Over 50% of this time was spent counseling the patient and/or coordinating care regarding ongoing LBP, discussion of plantar fasciitis and demonstrating exercises.  See note for details.      Moiz Hayes DO, CAQSM  Primary Care Sports Medicine

## 2020-09-14 ENCOUNTER — THERAPY VISIT (OUTPATIENT)
Dept: PHYSICAL THERAPY | Facility: CLINIC | Age: 60
End: 2020-09-14
Attending: FAMILY MEDICINE
Payer: COMMERCIAL

## 2020-09-14 DIAGNOSIS — M54.42 CHRONIC BILATERAL LOW BACK PAIN WITH BILATERAL SCIATICA: ICD-10-CM

## 2020-09-14 DIAGNOSIS — G89.29 CHRONIC BILATERAL LOW BACK PAIN WITH BILATERAL SCIATICA: ICD-10-CM

## 2020-09-14 DIAGNOSIS — M72.2 PLANTAR FASCIITIS, LEFT: ICD-10-CM

## 2020-09-14 DIAGNOSIS — M54.41 CHRONIC BILATERAL LOW BACK PAIN WITH BILATERAL SCIATICA: ICD-10-CM

## 2020-09-14 PROCEDURE — 97530 THERAPEUTIC ACTIVITIES: CPT | Mod: GP | Performed by: PHYSICAL THERAPIST

## 2020-09-14 PROCEDURE — 97162 PT EVAL MOD COMPLEX 30 MIN: CPT | Mod: GP | Performed by: PHYSICAL THERAPIST

## 2020-09-14 PROCEDURE — 97110 THERAPEUTIC EXERCISES: CPT | Mod: GP | Performed by: PHYSICAL THERAPIST
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
## 2020-09-15 PROBLEM — G89.29 CHRONIC BILATERAL LOW BACK PAIN WITH BILATERAL SCIATICA: Status: ACTIVE | Noted: 2020-09-15

## 2020-09-15 PROBLEM — M54.42 CHRONIC BILATERAL LOW BACK PAIN WITH BILATERAL SCIATICA: Status: ACTIVE | Noted: 2020-09-15

## 2020-09-15 PROBLEM — M54.41 CHRONIC BILATERAL LOW BACK PAIN WITH BILATERAL SCIATICA: Status: ACTIVE | Noted: 2020-09-15

## 2020-09-22 ENCOUNTER — THERAPY VISIT (OUTPATIENT)
Dept: PHYSICAL THERAPY | Facility: CLINIC | Age: 60
End: 2020-09-22
Attending: FAMILY MEDICINE
Payer: COMMERCIAL

## 2020-09-22 DIAGNOSIS — G89.29 CHRONIC BILATERAL LOW BACK PAIN WITH BILATERAL SCIATICA: ICD-10-CM

## 2020-09-22 DIAGNOSIS — M54.42 CHRONIC BILATERAL LOW BACK PAIN WITH BILATERAL SCIATICA: ICD-10-CM

## 2020-09-22 DIAGNOSIS — M54.41 CHRONIC BILATERAL LOW BACK PAIN WITH BILATERAL SCIATICA: ICD-10-CM

## 2020-09-22 PROCEDURE — 97530 THERAPEUTIC ACTIVITIES: CPT | Mod: GP | Performed by: PHYSICAL THERAPIST

## 2020-09-22 PROCEDURE — 97140 MANUAL THERAPY 1/> REGIONS: CPT | Mod: GP | Performed by: PHYSICAL THERAPIST

## 2020-09-22 PROCEDURE — 97110 THERAPEUTIC EXERCISES: CPT | Mod: GP | Performed by: PHYSICAL THERAPIST

## 2020-09-29 ENCOUNTER — THERAPY VISIT (OUTPATIENT)
Dept: PHYSICAL THERAPY | Facility: CLINIC | Age: 60
End: 2020-09-29
Attending: FAMILY MEDICINE
Payer: COMMERCIAL

## 2020-09-29 DIAGNOSIS — M54.41 CHRONIC BILATERAL LOW BACK PAIN WITH BILATERAL SCIATICA: ICD-10-CM

## 2020-09-29 DIAGNOSIS — G89.29 CHRONIC BILATERAL LOW BACK PAIN WITH BILATERAL SCIATICA: ICD-10-CM

## 2020-09-29 DIAGNOSIS — M54.42 CHRONIC BILATERAL LOW BACK PAIN WITH BILATERAL SCIATICA: ICD-10-CM

## 2020-09-29 PROCEDURE — 97530 THERAPEUTIC ACTIVITIES: CPT | Mod: GP | Performed by: PHYSICAL THERAPIST

## 2020-09-29 PROCEDURE — 97140 MANUAL THERAPY 1/> REGIONS: CPT | Mod: GP | Performed by: PHYSICAL THERAPIST

## 2020-09-29 PROCEDURE — 97110 THERAPEUTIC EXERCISES: CPT | Mod: GP | Performed by: PHYSICAL THERAPIST

## 2020-10-06 ENCOUNTER — THERAPY VISIT (OUTPATIENT)
Dept: PHYSICAL THERAPY | Facility: CLINIC | Age: 60
End: 2020-10-06
Payer: COMMERCIAL

## 2020-10-06 DIAGNOSIS — M54.41 CHRONIC BILATERAL LOW BACK PAIN WITH BILATERAL SCIATICA: ICD-10-CM

## 2020-10-06 DIAGNOSIS — M54.42 CHRONIC BILATERAL LOW BACK PAIN WITH BILATERAL SCIATICA: ICD-10-CM

## 2020-10-06 DIAGNOSIS — G89.29 CHRONIC BILATERAL LOW BACK PAIN WITH BILATERAL SCIATICA: ICD-10-CM

## 2020-10-06 PROCEDURE — 97530 THERAPEUTIC ACTIVITIES: CPT | Mod: GP | Performed by: PHYSICAL THERAPIST

## 2020-10-06 PROCEDURE — 97110 THERAPEUTIC EXERCISES: CPT | Mod: GP | Performed by: PHYSICAL THERAPIST
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
## 2020-10-13 ENCOUNTER — ANCILLARY PROCEDURE (OUTPATIENT)
Dept: GENERAL RADIOLOGY | Facility: CLINIC | Age: 60
End: 2020-10-13
Attending: FAMILY MEDICINE
Payer: COMMERCIAL

## 2020-10-13 ENCOUNTER — OFFICE VISIT (OUTPATIENT)
Dept: ORTHOPEDICS | Facility: CLINIC | Age: 60
End: 2020-10-13
Payer: COMMERCIAL

## 2020-10-13 VITALS — BODY MASS INDEX: 28.66 KG/M2 | WEIGHT: 172 LBS | HEIGHT: 65 IN

## 2020-10-13 DIAGNOSIS — M54.16 LUMBAR RADICULOPATHY: ICD-10-CM

## 2020-10-13 DIAGNOSIS — M72.2 PLANTAR FASCIITIS, LEFT: Primary | ICD-10-CM

## 2020-10-13 DIAGNOSIS — M51.369 LUMBAR DEGENERATIVE DISC DISEASE: ICD-10-CM

## 2020-10-13 PROCEDURE — 73630 X-RAY EXAM OF FOOT: CPT | Mod: LT | Performed by: RADIOLOGY

## 2020-10-13 PROCEDURE — 99213 OFFICE O/P EST LOW 20 MIN: CPT | Performed by: FAMILY MEDICINE

## 2020-10-13 ASSESSMENT — MIFFLIN-ST. JEOR: SCORE: 1531.03

## 2020-10-13 NOTE — PROGRESS NOTES
SPORTS & ORTHOPEDIC WALK-IN FOLLOW-UP VISIT 10/13/2020    Mentions that since our last appt, his L foot plantar fasciitis has slowly gotten better. PT has been working on exercise, soft tissue mobilization, with minor improvement.    Overall is slightly better, but not significantly.   PT suggested more firm mattress, which he received recently.  Mentions that his low back Sx will fluctuate from improvement to worsening.    Will notice some L hamstring pain after prolonged sitting/laying.    Interval History:     Follow up reason: persistent LBP and plantar fasciitis    Date of injury/onset: long standing LBP    Date last seen: 8/29/20    Following Therapeutic Plan: Yes     Pain: Improving    Function: Improving      Medical History:    Any recent changes to your medical history? No    Any new medication prescribed since last visit? No    Review of Systems:    Do you have fever, chills, weight loss? No    Do you have any vision problems? No    Do you have any chest pain or edema? No    Do you have any shortness of breath or wheezing?  No    Do you have stomach problems? No    Do you have any numbness or focal weakness? No    Do you have diabetes? No    Do you have problems with bleeding or clotting? No    Do you have an rashes or other skin lesions? No

## 2020-10-13 NOTE — LETTER
10/13/2020         RE: Callum Haywood  [address]  [place] [address]        Dear Colleague,    Thank you for referring your patient, Callum Haywood, to the [facility] [place]. Please see a copy of my visit note below.          SPORTS & ORTHOPEDIC WALK-IN FOLLOW-UP VISIT 10/13/2020    Mentions that since our last appt, his L foot plantar fasciitis has slowly gotten better. PT has been working on exercise, soft tissue mobilization, with minor improvement.    Overall is slightly better, but not significantly.   PT suggested more firm mattress, which he received recently.  Mentions that his low back Sx will fluctuate from improvement to worsening.    Will notice some L hamstring pain after prolonged sitting/laying.    Interval History:     Follow up reason: persistent LBP and plantar fasciitis    Date of injury/onset: long standing LBP    Date last seen: 8/29/20    Following Therapeutic Plan: Yes     Pain: Improving    Function: Improving      Medical History:    Any recent changes to your medical history? No    Any new medication prescribed since last visit? No    Review of Systems:    Do you have fever, chills, weight loss? No    Do you have any vision problems? No    Do you have any chest pain or edema? No    Do you have any shortness of breath or wheezing?  No    Do you have stomach problems? No    Do you have any numbness or focal weakness? No    Do you have diabetes? No    Do you have problems with bleeding or clotting? No    Do you have an rashes or other skin lesions? No             ESTABLISHED PATIENT FOLLOW-UP:  Follow Up of the Lower Back and Follow Up of the Left Foot       HISTORY OF PRESENT ILLNESS  Mr. Haywood is a pleasant 60 year old year old male who presents to clinic today for follow-up of left foot and radicular pain to left lower extremity.    Mentions that since our last appt, his L foot plantar fasciitis has slowly gotten  "better. PT has been working on exercise, soft tissue mobilization, with minor improvement.     Overall is slightly better, but not significantly.   PT suggested more firm mattress, which he received recently.  Mentions that his low back Sx will fluctuate from improvement to worsening.     Will notice some L hamstring pain after prolonged sitting/laying.     Interval History:     Follow up reason: persistent LBP and plantar fasciitis    Date of injury/onset: long standing LBP    Date last seen: 8/29/20    Following Therapeutic Plan: Yes     Pain: Improving mildly    Function: Callum notes function midly improved regarding lumbar spine    Interval Hx: Callum accompanied by son who serves as  today.  Callum has been participating in PT, performing foot HEP and using superfeet orthotics. Notes continued radicular pain of left lower extremity but has improved in intensity. Left heel pain is more problematic and worse in AM and during prolonged walking.  Still localizes to heel and near arch.    Additional medical/Social/Surgical histories reviewed in [facility] and updated as appropriate.    REVIEW OF SYSTEMS (10/14/2020)  CONSTITUTIONAL: Denies fever and weight loss  GASTROINTESTINAL: Denies abdominal pain, nausea, vomiting  MUSCULOSKELETAL: See HPI  SKIN: Denies any recent rash or lesion  NEUROLOGICAL: Denies numbness or focal weakness     PHYSICAL EXAM  Ht 1.657 m (5' 5.25\")   Wt 78 kg (172 lb)   BMI 28.40 kg/m        General  - normal appearance, in no obvious distress  Musculoskeletal - lumbar spine  - stance: normal gait without limp, no obvious leg length discrepancy, normal heel and toe walk  - inspection: normal bone and joint alignment, no obvious scoliosis  - palpation: no paravertebral or bony tenderness  - ROM: flexion and rotation, left sidebending exacerbates pain, normal unrestricted flexion, extension, sidebending, rotation  - strength: lower extremities 5/5 in all planes  - special tests:  (-) " straight leg raise  (-) slump test  Neuro  - patellar and Achilles DTRs 2+ bilaterally, no lower extremity sensory deficit throughout L5 distribution, grossly normal coordination, normal muscle tone  Musculoskeletal - Left foot  - stance: normal gait without limp, normal stance without excessive pronation, normal heel inversion with standing heel raise, no obvious leg length discrepancy, normal heel and toe walk  - inspection: no swelling or effusion,  normal bone and joint alignment, no obvious deformity  - palpation: tender at the medial calcaneal tubercle and proximal plantar fascia.  - ROM: normal active and passive ROM of great and lesser toes, no pain with MT translation  - strength: 5/5 in all planes  Skin  - no ecchymosis, erythema, warmth, or induration, no obvious rash  Psych  - interactive, appropriate, normal mood and affect       IMAGING : XR left foot 3V. Final results and radiologist's interpretation, available in the [facility] health record. Images were reviewed with the patient/family members in the office today. My personal interpretation of the performed imaging is no acute abnormalities of heel or midfoot.     ASSESSMENT & PLAN  Mr. Haywood is a 60 year old year old male who presents to clinic today with Follow Up of the Lower Back and Follow Up of the Left Foot    1. Plantar fasciitis of left foot  2. Lumbar degenerative disc disease with left sided radiculopathy    -Lumbar radicular pain unresponsive to epidural injections, PT, or medications; continue PT but will refer on to spine team for consultation  -Continue orthotic insert, HEP and ice massage  -Night splint for left foot  -Voltaren gel to plantar arch and heel QID.  -Follow up PRN     It was a pleasure seeing Callum.    Moiz Hayes DO, CAM  Primary Care Sports Medicine

## 2020-10-15 NOTE — PROGRESS NOTES
"ESTABLISHED PATIENT FOLLOW-UP:  Follow Up of the Lower Back and Follow Up of the Left Foot       HISTORY OF PRESENT ILLNESS  Mr. Haywood is a pleasant 60 year old year old male who presents to clinic today for follow-up of left foot and radicular pain to left lower extremity.    Mentions that since our last appt, his L foot plantar fasciitis has slowly gotten better. PT has been working on exercise, soft tissue mobilization, with minor improvement.     Overall is slightly better, but not significantly.   PT suggested more firm mattress, which he received recently.  Mentions that his low back Sx will fluctuate from improvement to worsening.     Will notice some L hamstring pain after prolonged sitting/laying.     Interval History:     Follow up reason: persistent LBP and plantar fasciitis    Date of injury/onset: long standing LBP    Date last seen: 8/29/20    Following Therapeutic Plan: Yes     Pain: Improving mildly    Function: Callum notes function midly improved regarding lumbar spine    Interval Hx: Callum accompanied by son who serves as  today.  Callum has been participating in PT, performing foot HEP and using superfeet orthotics. Notes continued radicular pain of left lower extremity but has improved in intensity. Left heel pain is more problematic and worse in AM and during prolonged walking.  Still localizes to heel and near arch.    Additional medical/Social/Surgical histories reviewed in [facility] and updated as appropriate.    REVIEW OF SYSTEMS (10/14/2020)  CONSTITUTIONAL: Denies fever and weight loss  GASTROINTESTINAL: Denies abdominal pain, nausea, vomiting  MUSCULOSKELETAL: See HPI  SKIN: Denies any recent rash or lesion  NEUROLOGICAL: Denies numbness or focal weakness     PHYSICAL EXAM  Ht 1.657 m (5' 5.25\")   Wt 78 kg (172 lb)   BMI 28.40 kg/m        General  - normal appearance, in no obvious distress  Musculoskeletal - lumbar spine  - stance: normal gait without limp, no obvious leg " length discrepancy, normal heel and toe walk  - inspection: normal bone and joint alignment, no obvious scoliosis  - palpation: no paravertebral or bony tenderness  - ROM: flexion and rotation, left sidebending exacerbates pain, normal unrestricted flexion, extension, sidebending, rotation  - strength: lower extremities 5/5 in all planes  - special tests:  (-) straight leg raise  (-) slump test  Neuro  - patellar and Achilles DTRs 2+ bilaterally, no lower extremity sensory deficit throughout L5 distribution, grossly normal coordination, normal muscle tone  Musculoskeletal - Left foot  - stance: normal gait without limp, normal stance without excessive pronation, normal heel inversion with standing heel raise, no obvious leg length discrepancy, normal heel and toe walk  - inspection: no swelling or effusion,  normal bone and joint alignment, no obvious deformity  - palpation: tender at the medial calcaneal tubercle and proximal plantar fascia.  - ROM: normal active and passive ROM of great and lesser toes, no pain with MT translation  - strength: 5/5 in all planes  Skin  - no ecchymosis, erythema, warmth, or induration, no obvious rash  Psych  - interactive, appropriate, normal mood and affect       IMAGING : XR left foot 3V. Final results and radiologist's interpretation, available in the [facility] health record. Images were reviewed with the patient/family members in the office today. My personal interpretation of the performed imaging is no acute abnormalities of heel or midfoot.     ASSESSMENT & PLAN  Mr. Haywood is a 60 year old year old male who presents to clinic today with Follow Up of the Lower Back and Follow Up of the Left Foot    1. Plantar fasciitis of left foot  2. Lumbar degenerative disc disease with left sided radiculopathy    -Lumbar radicular pain unresponsive to epidural injections, PT, or medications; continue PT but will refer on to spine team for consultation  -Continue orthotic insert, HEP and  ice massage  -Night splint for left foot  -Voltaren gel to plantar arch and heel QID.  -Follow up PRN     It was a pleasure seeing Callum.    Moiz Hayes DO, CAQSM  Primary Care Sports Medicine

## 2020-10-24 NOTE — TELEPHONE ENCOUNTER
DIAGNOSIS: Lumbar degenerative disc disease. Referred by walk in clinic   APPOINTMENT DATE: 10/24/2020   NOTES STATUS DETAILS   OFFICE NOTE from referring provider Internal 10/13/2020   OFFICE NOTE from other specialist Internal 10/13/2020   DISCHARGE SUMMARY from hospital N/A    DISCHARGE REPORT from the ER N/A    OPERATIVE REPORT N/A    MEDICATION LIST N/A    EMG (for Spine) N/A    IMPLANT RECORD/STICKER N/A    LABS     CBC/DIFF Internal    CULTURES N/A    INJECTIONS DONE IN RADIOLOGY N/A    MRI Internal 01/30/2020   CT SCAN N/A    XRAYS (IMAGES & REPORTS) Internal 10/13/2020   TUMOR     PATHOLOGY  Slides & report N/A

## 2020-10-28 ENCOUNTER — THERAPY VISIT (OUTPATIENT)
Dept: PHYSICAL THERAPY | Facility: CLINIC | Age: 60
End: 2020-10-28
Payer: COMMERCIAL

## 2020-10-28 DIAGNOSIS — G89.29 CHRONIC BILATERAL LOW BACK PAIN WITH BILATERAL SCIATICA: ICD-10-CM

## 2020-10-28 DIAGNOSIS — M54.41 CHRONIC BILATERAL LOW BACK PAIN WITH BILATERAL SCIATICA: ICD-10-CM

## 2020-10-28 DIAGNOSIS — M54.42 CHRONIC BILATERAL LOW BACK PAIN WITH BILATERAL SCIATICA: ICD-10-CM

## 2020-10-28 PROCEDURE — 97110 THERAPEUTIC EXERCISES: CPT | Mod: GP | Performed by: PHYSICAL THERAPIST

## 2020-10-29 DIAGNOSIS — M54.50 LUMBAR PAIN: Primary | ICD-10-CM

## 2020-11-03 ENCOUNTER — PRE VISIT (OUTPATIENT)
Dept: ORTHOPEDICS | Facility: CLINIC | Age: 60
End: 2020-11-03

## 2020-11-03 ENCOUNTER — ANCILLARY PROCEDURE (OUTPATIENT)
Dept: GENERAL RADIOLOGY | Facility: CLINIC | Age: 60
End: 2020-11-03
Attending: ORTHOPAEDIC SURGERY
Payer: COMMERCIAL

## 2020-11-03 ENCOUNTER — OFFICE VISIT (OUTPATIENT)
Dept: ORTHOPEDICS | Facility: CLINIC | Age: 60
End: 2020-11-03
Attending: FAMILY MEDICINE
Payer: COMMERCIAL

## 2020-11-03 VITALS — BODY MASS INDEX: 28.66 KG/M2 | HEIGHT: 65 IN | WEIGHT: 172 LBS

## 2020-11-03 DIAGNOSIS — M48.062 SPINAL STENOSIS OF LUMBAR REGION WITH NEUROGENIC CLAUDICATION: Primary | ICD-10-CM

## 2020-11-03 DIAGNOSIS — M51.369 LUMBAR DEGENERATIVE DISC DISEASE: ICD-10-CM

## 2020-11-03 PROCEDURE — 99203 OFFICE O/P NEW LOW 30 MIN: CPT | Performed by: ORTHOPAEDIC SURGERY

## 2020-11-03 PROCEDURE — 72110 X-RAY EXAM L-2 SPINE 4/>VWS: CPT | Performed by: RADIOLOGY

## 2020-11-03 RX ORDER — GABAPENTIN 100 MG/1
100 CAPSULE ORAL 3 TIMES DAILY PRN
Qty: 90 CAPSULE | Refills: 0 | Status: SHIPPED | OUTPATIENT
Start: 2020-11-03 | End: 2021-02-22

## 2020-11-03 ASSESSMENT — MIFFLIN-ST. JEOR: SCORE: 1531.03

## 2020-11-03 NOTE — NURSING NOTE
"Reason For Visit:   Chief Complaint   Patient presents with     Consult     Lumbar DDD referred by Dr. Hayes bilateral radiating pain. patient has had 2 recent injections and have had about 1 week worth of releif from either of them.        Primary MD: Stephie Lewis  Ref. MD: Freddy    ?  No  Occupation  .  Currently working? Yes.  Work status?  Full time.  Date of injury: about 1 year ago   Type of injury: chronic .  Date of surgery: none   Type of surgery: none .  Smoker: No  Request smoking cessation information: No    Ht 1.657 m (5' 5.25\")   Wt 78 kg (172 lb)   BMI 28.40 kg/m           Oswestry (CASTRO) Questionnaire    OSWESTRY DISABILITY INDEX 9/15/2020   Count 7   Sum 11   Oswestry Score (%) 31.43   Some recent data might be hidden            Neck Disability Index (NDI) Questionnaire    No flowsheet data found.                Promis 10 Assessment    No flowsheet data found.             Andrey Combs, ATC  "

## 2020-11-03 NOTE — LETTER
11/3/2020         RE: Callum Hawyood  [address]  [place] [address]        Dear Colleague,    Thank you for referring your patient, Callum Haywood, to the [facility] [place]. Please see a copy of my visit note below.    spine Surgery Consultation    REFERRING PHYSICIAN: Moiz Hayes   PRIMARY CARE PHYSICIAN: Stephie Lewis           Chief Complaint:   Consult (Lumbar DDD referred by Dr. Hayes bilateral radiating pain. patient has had 2 recent injections and have had about 1 week worth of releif from either of them. )      History of Present Illness:  Symptom Profile Including: location of symptoms, onset, severity, exacerbating/alleviating factors, previous treatments:        Callum Haywood is a 60 year old male who presents today for evaluation of 2 separate issues.  The first is plantar foot pain along the medial arch.  He is previously been diagnosed with plantar fasciitis.  He is wearing a night splint and has been to physical therapy for this.  He says his plantar foot pain is present only in the morning and seems to get better throughout the day.    His other issue is a feeling of pain as well as tightness and weakness in the buttock and thigh area.  This seems to be somewhat worse with activity particularly running.  He was an avid runner previously and the current symptoms really prevent him from walking or running distances.  He is previously done 2 epidural injections neither of which provided any lasting relief.  He is also taken diclofenac.  He is engaged in physical therapy and he thinks that the therapy helps quite a bit with low back pain but has not seemed to help at all with his leg symptoms.  He also recently bought a new mattress and that seems to help with his low back pain.         Past Medical History:     Past Medical History:   Diagnosis Date     DDD (degenerative disc disease), lumbar      Esophageal reflux       "Helicobacter pylori (H. pylori)      Irritable bowel syndrome      Low back pain      Other specified psychosexual disorder      Pure hypercholesterolemia             Past Surgical History:     Past Surgical History:   Procedure Laterality Date     NO HISTORY OF SURGERY              Social History:     Social History     Tobacco Use     Smoking status: Never Smoker     Smokeless tobacco: Never Used   Substance Use Topics     Alcohol use: Yes     Comment: 3 times a month             Family History:     Family History   Problem Relation Age of Onset     C.A.D. No family hx of      Diabetes No family hx of      Hypertension No family hx of      Cerebrovascular Disease No family hx of      Breast Cancer No family hx of      Cancer - colorectal No family hx of      Prostate Cancer No family hx of      Alcohol/Drug No family hx of      Allergies No family hx of      Alzheimer Disease No family hx of             Allergies:     Allergies   Allergen Reactions     No Known Allergies      Seasonal Allergies             Medications:     Current Outpatient Medications   Medication     acetaminophen (TYLENOL) 500 MG tablet     cholecalciferol (VITAMIN D) 1000 UNIT tablet     diclofenac (VOLTAREN) 1 % topical gel     diclofenac (VOLTAREN) 75 MG EC tablet     ibuprofen (ADVIL/MOTRIN) 200 MG tablet     No current facility-administered medications for this visit.              Review of Systems:     A 10 point ROS was performed and reviewed. Specific responses to these questions are noted at the end of the document.         Physical Exam:   Vitals: Ht 1.657 m (5' 5.25\")   Wt 78 kg (172 lb)   BMI 28.40 kg/m    Constitutional: awake, alert, cooperative, no apparent distress, appears stated age.    Eyes: The sclera are white.  Ears, Nose, Throat: The trachea is midline.  Psychiatric: The patient has a normal affect.  Respiratory: breathing non-labored  Cardiovascular: The extremities are warm and perfused.  Skin: no obvious rashes or " lesions.  Musculoskeletal, Neurologic, and Spine:            Lumbar Spine:    Appearance - No gross stepoffs or deformities    Motor -     L2-3: Hip flexion 5/5 R and 5/5 L strength          L3/4:  Knee extension R 5/5 and L 5/5 strength         L4/5:  Foot dorsiflexion R 5/5 L 5/5 and       EHL dorsiflexion R 4/5 L 4/5 strength         S1:  Plantarflexion/Peroneal Muscles  R 5/5 and L 5/5 strength    Sensation: intact to light touch L3-S1 distribution BLE      Neurologic:      REFLEXES Left Right                  Patella 1+ 1+   Ankle jerk 1+ 1+   Babinski No upgoing great toe No upgoing great toe   Clonus 0 beats 0 beats     Hip Exam:  No pain with hip log roll and no tenderness over the greater trochanters.    Alignment:  Patient stands with a neutral standing sagittal balance.           Imaging:   We ordered and independently reviewed new radiographs at this clinic visit. The results were discussed with the patient.  Findings include:     Lumbar radiographs show mild diffuse spondylosis    Lumbar MRI 1/2020 show L4-5 lateral recess stenosis and severe L5-S1 foraminal stenosis             Assessment and Plan:   Assessment:  60 year old male with L5-S1 foraminal stenosis and L4-5 stenosis.     Plan:  We talked about options at this point.  I explained that if he did pursue a surgery for his low back the primary benefit would be with some of the hamstring and leg claudicatory radicular symptoms.  I do not know for sure if he would be able to return toRunning distances.  He does have lumbar spondylosis and I would not expect all of his low back pain to go away with a decompression type surgery.  I also do not think it would solve his plantar fasciitis issue as I think that is a separate problem.    1. They are going to think about things.  I gave him a referral for gabapentin and they are going to try this medication.  I explained the sedative side effects and I explained that if things were to become confusing or  sedative that he would need to back off or stop taking the medication.  We agreed to provide an initial prescription and then if it does help him he should go to his primary care for further refills.  2. Given that prior injections have not been helpful for him I recommended against repeating them.  He is also going to continue with his physical therapy.  They want to think about things and if they have any other questions about a possible decompression surgery they were are welcome to reach out to me at any time.      Respectfully,  Daniel Javed MD  Spine Surgery  [facility]

## 2020-11-03 NOTE — PROGRESS NOTES
spine Surgery Consultation    REFERRING PHYSICIAN: Moiz Hayes   PRIMARY CARE PHYSICIAN: Stephie Lewis           Chief Complaint:   Consult (Lumbar DDD referred by Dr. Hayes bilateral radiating pain. patient has had 2 recent injections and have had about 1 week worth of releif from either of them. )      History of Present Illness:  Symptom Profile Including: location of symptoms, onset, severity, exacerbating/alleviating factors, previous treatments:        Callum Haywood is a 60 year old male who presents today for evaluation of 2 separate issues.  The first is plantar foot pain along the medial arch.  He is previously been diagnosed with plantar fasciitis.  He is wearing a night splint and has been to physical therapy for this.  He says his plantar foot pain is present only in the morning and seems to get better throughout the day.    His other issue is a feeling of pain as well as tightness and weakness in the buttock and thigh area.  This seems to be somewhat worse with activity particularly running.  He was an avid runner previously and the current symptoms really prevent him from walking or running distances.  He is previously done 2 epidural injections neither of which provided any lasting relief.  He is also taken diclofenac.  He is engaged in physical therapy and he thinks that the therapy helps quite a bit with low back pain but has not seemed to help at all with his leg symptoms.  He also recently bought a new mattress and that seems to help with his low back pain.         Past Medical History:     Past Medical History:   Diagnosis Date     DDD (degenerative disc disease), lumbar      Esophageal reflux      Helicobacter pylori (H. pylori)      Irritable bowel syndrome      Low back pain      Other specified psychosexual disorder      Pure hypercholesterolemia             Past Surgical History:     Past Surgical History:   Procedure Laterality Date     NO HISTORY OF SURGERY               "Social History:     Social History     Tobacco Use     Smoking status: Never Smoker     Smokeless tobacco: Never Used   Substance Use Topics     Alcohol use: Yes     Comment: 3 times a month             Family History:     Family History   Problem Relation Age of Onset     C.A.D. No family hx of      Diabetes No family hx of      Hypertension No family hx of      Cerebrovascular Disease No family hx of      Breast Cancer No family hx of      Cancer - colorectal No family hx of      Prostate Cancer No family hx of      Alcohol/Drug No family hx of      Allergies No family hx of      Alzheimer Disease No family hx of             Allergies:     Allergies   Allergen Reactions     No Known Allergies      Seasonal Allergies             Medications:     Current Outpatient Medications   Medication     acetaminophen (TYLENOL) 500 MG tablet     cholecalciferol (VITAMIN D) 1000 UNIT tablet     diclofenac (VOLTAREN) 1 % topical gel     diclofenac (VOLTAREN) 75 MG EC tablet     ibuprofen (ADVIL/MOTRIN) 200 MG tablet     No current facility-administered medications for this visit.              Review of Systems:     A 10 point ROS was performed and reviewed. Specific responses to these questions are noted at the end of the document.         Physical Exam:   Vitals: Ht 1.657 m (5' 5.25\")   Wt 78 kg (172 lb)   BMI 28.40 kg/m    Constitutional: awake, alert, cooperative, no apparent distress, appears stated age.    Eyes: The sclera are white.  Ears, Nose, Throat: The trachea is midline.  Psychiatric: The patient has a normal affect.  Respiratory: breathing non-labored  Cardiovascular: The extremities are warm and perfused.  Skin: no obvious rashes or lesions.  Musculoskeletal, Neurologic, and Spine:            Lumbar Spine:    Appearance - No gross stepoffs or deformities    Motor -     L2-3: Hip flexion 5/5 R and 5/5 L strength          L3/4:  Knee extension R 5/5 and L 5/5 strength         L4/5:  Foot dorsiflexion R 5/5 L 5/5 " and       EHL dorsiflexion R 4/5 L 4/5 strength         S1:  Plantarflexion/Peroneal Muscles  R 5/5 and L 5/5 strength    Sensation: intact to light touch L3-S1 distribution BLE      Neurologic:      REFLEXES Left Right                  Patella 1+ 1+   Ankle jerk 1+ 1+   Babinski No upgoing great toe No upgoing great toe   Clonus 0 beats 0 beats     Hip Exam:  No pain with hip log roll and no tenderness over the greater trochanters.    Alignment:  Patient stands with a neutral standing sagittal balance.           Imaging:   We ordered and independently reviewed new radiographs at this clinic visit. The results were discussed with the patient.  Findings include:     Lumbar radiographs show mild diffuse spondylosis    Lumbar MRI 1/2020 show L4-5 lateral recess stenosis and severe L5-S1 foraminal stenosis             Assessment and Plan:   Assessment:  60 year old male with L5-S1 foraminal stenosis and L4-5 stenosis.     Plan:  We talked about options at this point.  I explained that if he did pursue a surgery for his low back the primary benefit would be with some of the hamstring and leg claudicatory radicular symptoms.  I do not know for sure if he would be able to return toRunning distances.  He does have lumbar spondylosis and I would not expect all of his low back pain to go away with a decompression type surgery.  I also do not think it would solve his plantar fasciitis issue as I think that is a separate problem.    1. They are going to think about things.  I gave him a referral for gabapentin and they are going to try this medication.  I explained the sedative side effects and I explained that if things were to become confusing or sedative that he would need to back off or stop taking the medication.  We agreed to provide an initial prescription and then if it does help him he should go to his primary care for further refills.  2. Given that prior injections have not been helpful for him I recommended against  repeating them.  He is also going to continue with his physical therapy.  They want to think about things and if they have any other questions about a possible decompression surgery they were are welcome to reach out to me at any time.      Respectfully,  Daniel Javed MD  Spine Surgery  [facility]

## 2020-11-11 ENCOUNTER — THERAPY VISIT (OUTPATIENT)
Dept: PHYSICAL THERAPY | Facility: CLINIC | Age: 60
End: 2020-11-11
Payer: COMMERCIAL

## 2020-11-11 DIAGNOSIS — M54.42 CHRONIC BILATERAL LOW BACK PAIN WITH BILATERAL SCIATICA: ICD-10-CM

## 2020-11-11 DIAGNOSIS — M54.41 CHRONIC BILATERAL LOW BACK PAIN WITH BILATERAL SCIATICA: ICD-10-CM

## 2020-11-11 DIAGNOSIS — G89.29 CHRONIC BILATERAL LOW BACK PAIN WITH BILATERAL SCIATICA: ICD-10-CM

## 2020-11-11 PROCEDURE — 97112 NEUROMUSCULAR REEDUCATION: CPT | Mod: GP | Performed by: PHYSICAL THERAPIST

## 2020-11-11 PROCEDURE — 97110 THERAPEUTIC EXERCISES: CPT | Mod: GP | Performed by: PHYSICAL THERAPIST

## 2020-11-22 ENCOUNTER — HEALTH MAINTENANCE LETTER (OUTPATIENT)
Age: 60
End: 2020-11-22

## 2021-02-22 ENCOUNTER — OFFICE VISIT (OUTPATIENT)
Dept: FAMILY MEDICINE | Facility: CLINIC | Age: 61
End: 2021-02-22
Payer: COMMERCIAL

## 2021-02-22 VITALS
HEART RATE: 66 BPM | DIASTOLIC BLOOD PRESSURE: 78 MMHG | OXYGEN SATURATION: 97 % | TEMPERATURE: 97.6 F | BODY MASS INDEX: 27 KG/M2 | SYSTOLIC BLOOD PRESSURE: 134 MMHG | WEIGHT: 168 LBS | RESPIRATION RATE: 18 BRPM | HEIGHT: 66 IN

## 2021-02-22 DIAGNOSIS — M72.2 PLANTAR FASCIITIS, LEFT: ICD-10-CM

## 2021-02-22 DIAGNOSIS — Z00.00 ROUTINE GENERAL MEDICAL EXAMINATION AT A HEALTH CARE FACILITY: Primary | ICD-10-CM

## 2021-02-22 LAB
ANION GAP SERPL CALCULATED.3IONS-SCNC: 8 MMOL/L (ref 3–14)
BUN SERPL-MCNC: 15 MG/DL (ref 7–30)
CALCIUM SERPL-MCNC: 9.3 MG/DL (ref 8.5–10.1)
CHLORIDE SERPL-SCNC: 110 MMOL/L (ref 94–109)
CHOLEST SERPL-MCNC: 228 MG/DL
CO2 SERPL-SCNC: 24 MMOL/L (ref 20–32)
CREAT SERPL-MCNC: 0.7 MG/DL (ref 0.66–1.25)
GFR SERPL CREATININE-BSD FRML MDRD: >90 ML/MIN/{1.73_M2}
GLUCOSE SERPL-MCNC: 117 MG/DL (ref 70–99)
HDLC SERPL-MCNC: 56 MG/DL
LDLC SERPL CALC-MCNC: 158 MG/DL
NONHDLC SERPL-MCNC: 172 MG/DL
POTASSIUM SERPL-SCNC: 4.1 MMOL/L (ref 3.4–5.3)
PSA SERPL-ACNC: 0.49 UG/L (ref 0–4)
SODIUM SERPL-SCNC: 142 MMOL/L (ref 133–144)
TRIGL SERPL-MCNC: 68 MG/DL

## 2021-02-22 PROCEDURE — 99396 PREV VISIT EST AGE 40-64: CPT | Performed by: NURSE PRACTITIONER

## 2021-02-22 PROCEDURE — 80048 BASIC METABOLIC PNL TOTAL CA: CPT | Performed by: NURSE PRACTITIONER

## 2021-02-22 PROCEDURE — G0103 PSA SCREENING: HCPCS | Performed by: NURSE PRACTITIONER

## 2021-02-22 PROCEDURE — 82306 VITAMIN D 25 HYDROXY: CPT | Performed by: NURSE PRACTITIONER

## 2021-02-22 PROCEDURE — 99213 OFFICE O/P EST LOW 20 MIN: CPT | Mod: 25 | Performed by: NURSE PRACTITIONER

## 2021-02-22 PROCEDURE — 36415 COLL VENOUS BLD VENIPUNCTURE: CPT | Performed by: NURSE PRACTITIONER

## 2021-02-22 PROCEDURE — 80061 LIPID PANEL: CPT | Performed by: NURSE PRACTITIONER

## 2021-02-22 ASSESSMENT — ENCOUNTER SYMPTOMS
ABDOMINAL PAIN: 0
DIARRHEA: 0
HEMATOCHEZIA: 0
COUGH: 0
HEMATURIA: 0
CHILLS: 0
CONSTIPATION: 0

## 2021-02-22 ASSESSMENT — MIFFLIN-ST. JEOR: SCORE: 1516.85

## 2021-02-22 NOTE — PROGRESS NOTES
SUBJECTIVE:   CC: Callum Haywood is an 60 year old male who presents for preventative health visit.     {Split Bill scripting  The purpose of this visit is to discuss your medical history and prevent health problems before you are sick. You may be responsible for a co-pay, coinsurance, or deductible if your visit today includes services such as checking on a sore throat, having an x-ray or lab test, or treating and evaluating a new or existing condition  Patient has been advised of split billing requirements and indicates understanding: Yes     Healthy Habits:     Getting at least 3 servings of Calcium per day:  NO    Bi-annual eye exam:  Yes    Dental care twice a year:  NO    Sleep apnea or symptoms of sleep apnea:  None    Diet:  Regular (no restrictions)    Frequency of exercise:  6-7 days/week    Duration of exercise:  15-30 minutes    Taking medications regularly:  Yes    Medication side effects:  Not applicable    PHQ-2 Total Score: 0    Additional concerns today:  No  Had epidural injection in back; back pain has improved   Physical therapy in the past (mainly for his back, a bit for his foot).  Plantar fascitis has remained the same. Using foot splint at night   He is still having left foot pain.   Insert in shoes, doing exercise at home.                 Today's PHQ-2 Score:   PHQ-2 ( 1999 [employer]) 2/22/2021   Q1: Little interest or pleasure in doing things 0   Q2: Feeling down, depressed or hopeless 0   PHQ-2 Score 0   Q1: Little interest or pleasure in doing things Not at all   Q2: Feeling down, depressed or hopeless Not at all   PHQ-2 Score 0       Abuse: Current or Past(Physical, Sexual or Emotional)- No  Do you feel safe in your environment? Yes        Social History     Tobacco Use     Smoking status: Never Smoker     Smokeless tobacco: Never Used   Substance Use Topics     Alcohol use: Yes     Comment: 3 times a month          Alcohol Use 2/22/2021   Prescreen: >3 drinks/day or >7 drinks/week?  "Not Applicable   Prescreen: >3 drinks/day or >7 drinks/week? -       Last PSA:   PSA   Date Value Ref Range Status   01/22/2020 0.67 0 - 4 ug/L Final     Comment:     Assay Method:  Chemiluminescence using Siemens Vista analyzer       Reviewed orders with patient. Reviewed health maintenance and updated orders accordingly - Yes      Reviewed and updated as needed this visit by clinical staff  Tobacco  Allergies  Meds              Reviewed and updated as needed this visit by Provider                    Review of Systems   Constitutional: Negative for chills.   HENT: Negative for congestion.    Respiratory: Negative for cough.    Cardiovascular: Negative for chest pain.   Gastrointestinal: Negative for abdominal pain, constipation, diarrhea and hematochezia.   Genitourinary: Negative for hematuria.         OBJECTIVE:   /78   Pulse 66   Temp 97.6  F (36.4  C) (Tympanic)   Resp 18   Ht 1.664 m (5' 5.5\")   Wt 76.2 kg (168 lb)   SpO2 97%   BMI 27.53 kg/m      Physical Exam  GENERAL: healthy, alert and no distress  EYES: Eyes grossly normal to inspection, PERRL and conjunctivae and sclerae normal  HENT: ear canals and TM's normal, nose and mouth without ulcers or lesions  NECK: no adenopathy, no asymmetry, masses, or scars and thyroid normal to palpation  RESP: lungs clear to auscultation - no rales, rhonchi or wheezes  CV: regular rate and rhythm, normal S1 S2, no S3 or S4, no murmur, click or rub, no peripheral edema and peripheral pulses strong  ABDOMEN: soft, nontender, no hepatosplenomegaly, no masses and bowel sounds normal  MS: tenderness left heel  SKIN: no suspicious lesions or rashes  NEURO: Normal strength and tone, mentation intact and speech normal  PSYCH: mentation appears normal, affect normal/bright        ASSESSMENT/PLAN:   1. Routine general medical examination at a health care facility    - PSA, screen  - Basic metabolic panel  (Ca, Cl, CO2, Creat, Gluc, K, Na, BUN)  - Lipid panel reflex " "to direct LDL Fasting  - Vitamin D Deficiency    2. Plantar fasciitis, left  Will refer to podiatry.   - Orthopedic & Spine  Referral; Future    Patient has been advised of split billing requirements and indicates understanding:   COUNSELING:   Reviewed preventive health counseling, as reflected in patient instructions    Estimated body mass index is 27.53 kg/m  as calculated from the following:    Height as of this encounter: 1.664 m (5' 5.5\").    Weight as of this encounter: 76.2 kg (168 lb).     Weight management plan: Discussed healthy diet and exercise guidelines    He reports that he has never smoked. He has never used smokeless tobacco.      Counseling Resources:  ATP IV Guidelines  Pooled Cohorts Equation Calculator  FRAX Risk Assessment  ICSI Preventive Guidelines  Dietary Guidelines for Americans, 2010  USDA's MyPlate  ASA Prophylaxis  Lung CA Screening    Stephie Lewis NP  [facility]  "

## 2021-02-23 LAB — DEPRECATED CALCIDIOL+CALCIFEROL SERPL-MC: 52 UG/L (ref 20–75)

## 2021-03-09 ENCOUNTER — OFFICE VISIT (OUTPATIENT)
Dept: PODIATRY | Facility: CLINIC | Age: 61
End: 2021-03-09
Payer: COMMERCIAL

## 2021-03-09 VITALS
SYSTOLIC BLOOD PRESSURE: 122 MMHG | DIASTOLIC BLOOD PRESSURE: 70 MMHG | WEIGHT: 168 LBS | HEIGHT: 66 IN | BODY MASS INDEX: 27 KG/M2

## 2021-03-09 DIAGNOSIS — M72.2 PLANTAR FASCIITIS, LEFT: ICD-10-CM

## 2021-03-09 DIAGNOSIS — M79.672 CHRONIC HEEL PAIN, LEFT: Primary | ICD-10-CM

## 2021-03-09 DIAGNOSIS — G89.29 CHRONIC HEEL PAIN, LEFT: Primary | ICD-10-CM

## 2021-03-09 PROCEDURE — 99243 OFF/OP CNSLTJ NEW/EST LOW 30: CPT | Performed by: PODIATRIST

## 2021-03-09 RX ORDER — GABAPENTIN 100 MG/1
CAPSULE ORAL
COMMUNITY
Start: 2020-11-05 | End: 2022-02-28

## 2021-03-09 RX ORDER — GABAPENTIN 300 MG/1
CAPSULE ORAL
COMMUNITY
Start: 2020-06-13 | End: 2022-02-28

## 2021-03-09 RX ORDER — DICLOFENAC SODIUM 75 MG/1
TABLET, DELAYED RELEASE ORAL
COMMUNITY
Start: 2020-08-30 | End: 2022-02-28

## 2021-03-09 ASSESSMENT — MIFFLIN-ST. JEOR: SCORE: 1516.85

## 2021-03-09 NOTE — PATIENT INSTRUCTIONS
Recommend stiffer soled shoe and custom orthotics    PLANTAR FASCIITIS  Plantar fasciitis is often referred to as heel spurs or heel pain. Plantar fasciitis is a very common problem that affects people of all foot shapes, age, weight and activity level. Pain may be in the arch or on the weight-bearing surface of the heel. The pain may come on without injury or identifiable cause. Pain is generally present when first getting out of bed in the morning or up from a seated break.     CAUSES  The plantar fascia is a dense fibrous band of tissue that stretches across the bottom surface of the foot. The fascia helps support the foot muscles and arch. Plantar fasciitis is thought to be caused by mechanical strain or overload. Frequent walking without shoes or wearing unsupportive shoes is thought to cause structural overload and ultimately inflammation of the plantar fascia. Some people have heel spurs that can be seen on x-ray. The heel spur is actually a minor component of plantar fascitis and is largely ignored.       SELF TREATMENT   The easiest solution is to stop walking around your home without shoes. Plantar fasciitis is largely a shoe problem. Shoes are either not being worn often enough or your current shoes are inadequate for your weight, foot structure or activity level. The majority of shoes on the market today are not sufficient to resist development of plantar fasciitis or to promote healing. Assume that your current shoes are inadequate and will need to be replaced. Even high quality shoes wear out with 6 months to one year of frequent use. Weight loss is another option. Losing ten pounds in the next two months may be enough to resolve the problem. Ice applied to the area of pain two to three times per day for ten minutes each session can be very helpful. Warm foot soaks in epsom salts can also relieve pain. This should continue until the problem resolves. Achilles tendon stretching is essential. Stretch  multiple times daily to promote healing and to prevent recurrence in the future. Over all stretching of the body is helpful as well such as the calves, thighs and lower back. Normally when one area of the body is tight, other areas are too. Gentle Yoga can be good for this.     Over the counter topical anti inflammatories can be helpful such as biofreeze, bengay, salon pas, ect...  Oral ibuprofen or aleve is recommended as well to try to calm down inflammation.     Night splints can be helpful to gradually stretch the foot at night as a lot of pain is when you get up in the morning. Taking a towel or thera band and stretching the foot back multiple times before you get ou of bed can be beneficial as well.     MEDICAL TREATMENT  Medical treatments often include custom arch supports, cortisone injections, physical therapy, splints to be worn in bed, prescription medications and surgery. The home treatments listed above will be necessary regardless of these advanced medical treatments. Surgery is rarely needed but is very helpful in selected cases.     PROGNOSIS  Plantar fasciitis can last from one day to a lifetime. Some people get intermittent fascitis that is very short-lived. Others suffer daily for years. Excessive body weight, frequent bare foot walking, long hours on the feet, inadequate shoes, predisposing foot structures and excessive activity such as running are all potential issues that lead to chronic and/or recurring plantar fascitis. Having plantar fasciitis means that you are forever prone to this problem and will require modification of some of the above factors. Most people seek treatment within one to four months. Healing usually requires a similar one to four month time frame. Healing time is relative to the amount of effort spent treating the problem.   Plantar fasciitis is highly recurrent. Risk factors often continue, including return to bare foot walking, inadequate shoes, excessive body weight,  excessive activities, etc. Your life style and foot structure may predispose you to recurrent plantar fasciitis. A daily prevention regimen can be very helpful. Ongoing use of shoe inserts, careful attention to appropriate shoes, daily Achilles stretching, etc. may prevent recurrence. Prompt attention at the earliest warning signs of heel pain can resolve the problem in as short as a few days.     EXERCISES  Stair Exercise: Step on the stairs with the ball of your foot and hold your position for at least 15 seconds, then slowly step down with the heels of your foot. You can do this daily and as often as you want.   Picking the Towel: Sit comfortably and then pick the towel up with your toes. You can use any object other than a towel as long as the material can be soft and you can pick it up with your toes.  Rolling the Bottle: Use a small ball or frozen water bottle and then roll it around with your foot.   Flex the Toes: Sit comfortably and then flex your toes by pointing it towards the floor or towards your body. This will relax and flex your foot and exercise your plantar fascia, the calf, and the Achilles tendon. The inability of the foot to stretch often causes the bunching up of the plantar fascia area leading to the pain.  Calf/Achilles Stretching: Lay on you back and raise one foot, then point your toes towards the floor. See photo below:               Hold each stretch for 10 seconds. Stretch 10 times per set, three sets per day. Morning, afternoon and evening. If your heel pain is very severe in the morning, consider doing the first set of stretches before you get out of bed.      OVER THE COUNTER INSERT RECOMMENDATIONS  SuperFeet   Sofsole Fit Spenco   Power Step   Walk-Fit Arch Cradles     Most of these can be found at your local [employer], [employer], or online.  **A good high quality over the counter insert should cost around $40-$50      [facility]  [address]  [facility]  [phone]   [address]  [phone] [place]  [address]  [phone]   [place]  [address]  [phone]   [place]  [address]  [phone] [place] [address]  [phone]   [place]  [address]  [phone]   [place]  [address]  [phone] [place]  [address]  [phone]         [facility] LOCATIONS  [facility]  [address]  [place] [address]  Phone: [phone]  Fax: [phone] [facility]  [address]  [place] [address]  Phone: [phone]   Fax: [phone]   [facility]  [address]  [place] [address]  Phone: [phone]  Fax: [phone] [facility]  [address]  [place] [address]  Phone: [phone]   Fax: [phone]   [facility]   [address]  [place] [address]  Phone: [phone]  Fax: [phone] * Please call any location listed to make an appointment for a casting/fitting. Your referral was sent to their central office and they will all have the order on file.

## 2021-03-09 NOTE — LETTER
3/9/2021         RE: Callum Haywood  [address]  [place] [address]        Dear Colleague,    Thank you for referring your patient, Callum Haywood, to the [facility]. Please see a copy of my visit note below.    ASSESSMENT:  Encounter Diagnoses   Name Primary?     Chronic heel pain, left Yes     Plantar fasciitis, left      MEDICAL DECISION MAKING:  I reviewed the 2/22/2021 documentation by referring provider.    He has had this pain for 10 months.  It is consistent with plantar fasciitis.  There is risk of this becoming a chronic condition.    The patient was educated about the causes and nature of arch and heel pain.  The anatomy and function of the plantar fascia was discussed.  The treatment plan discussed included icing, calf and plantar fascial stretching, avoidance of barefoot walking, wearing sturdy, supportive, stiffer-soled athletic-type shoes, activity modification, and over-the-counter arch supports versus custom orthoses.  A comprehensive After-Visit Summary was provided.     He and his son are interested in first purchasing stiff soled shoes and trying custom orthoses.     Callum Haywood is referred to [facility] for prescription orthoses.        Disclaimer: This note consists of symbols derived from keyboarding, dictation and/or voice recognition software. As a result, there may be errors in the script that have gone undetected. Please consider this when interpreting information found in this chart.    Jake Carlos DPM, FACFAS, MS    [place] [facility] & Ankle [facility]      ____________________________________________________________________    HPI:       I was asked by Stephie Lewis NP to evaluate Callum Haywood in consultation for left foot plantar fasciitis.     Chief Complaint: left heel pain, plantar fasciitis  Onset of problem: 10 months  Pain/ discomfort is described as:   "Deep ache  Pain Ratin/10   Frequency:  intermittent    The pain is exacerbated by walking  Previous treatment: physical therapy, night splint, over-the-counter arch support  *  Past Medical History:   Diagnosis Date     DDD (degenerative disc disease), lumbar      Esophageal reflux      Helicobacter pylori (H. pylori)      Irritable bowel syndrome      Low back pain      Other specified psychosexual disorder      Pure hypercholesterolemia    *  *  Past Surgical History:   Procedure Laterality Date     NO HISTORY OF SURGERY     *  *  Current Outpatient Medications   Medication Sig Dispense Refill     cholecalciferol (VITAMIN D) 1000 UNIT tablet Take 1,000 Units by mouth daily Vitamin d 3 100 tablet 3     diclofenac (VOLTAREN) 1 % topical gel        diclofenac (VOLTAREN) 75 MG EC tablet        gabapentin (NEURONTIN) 100 MG capsule        gabapentin (NEURONTIN) 300 MG capsule            EXAM:    Vitals: /70   Ht 1.664 m (5' 5.5\")   Wt 76.2 kg (168 lb)   BMI 27.53 kg/m    BMI: Body mass index is 27.53 kg/m .    Constitutional:  Callum Haywood is in no apparent distress, appears well-nourished.  Cooperative with history and physical exam.    Vascular:  Pedal pulses are palpable for both the DP and PT arteries.  CFT < 3 sec.  No edema.      Neuro: Light touch sensation is intact to the L4, L5, S1 distributions  No evidence of weakness, spasticity, or contracture in the lower extremities.     Derm: Normal texture and turgor.  No erythema, ecchymosis, or cyanosis.  No open lesions.     Musculoskeletal:    Lower extremity muscle strength is normal. No gross deformities.  Pain on palpation to the plantar medial aspect of the left heel.  No significant pain with side-to-side compression of the heel.  No nodularity noted.          Again, thank you for allowing me to participate in the care of your patient.        Sincerely,        Jake Carlos, IDALMIS    "

## 2021-03-09 NOTE — PROGRESS NOTES
ASSESSMENT:  Encounter Diagnoses   Name Primary?     Chronic heel pain, left Yes     Plantar fasciitis, left      MEDICAL DECISION MAKING:  I reviewed the 2021 documentation by referring provider.    He has had this pain for 10 months.  It is consistent with plantar fasciitis.  There is risk of this becoming a chronic condition.    The patient was educated about the causes and nature of arch and heel pain.  The anatomy and function of the plantar fascia was discussed.  The treatment plan discussed included icing, calf and plantar fascial stretching, avoidance of barefoot walking, wearing sturdy, supportive, stiffer-soled athletic-type shoes, activity modification, and over-the-counter arch supports versus custom orthoses.  A comprehensive After-Visit Summary was provided.     He and his son are interested in first purchasing stiff soled shoes and trying custom orthoses.     Callum Haywood is referred to [facility] for prescription orthoses.        Disclaimer: This note consists of symbols derived from keyboarding, dictation and/or voice recognition software. As a result, there may be errors in the script that have gone undetected. Please consider this when interpreting information found in this chart.    Jake Carlos DPM, FACFAS, MS    [place] [facility] & [facility]le [facility]      ____________________________________________________________________    HPI:       I was asked by Stephie Lewis NP to evaluate Callum Haywood in consultation for left foot plantar fasciitis.     Chief Complaint: left heel pain, plantar fasciitis  Onset of problem: 10 months  Pain/ discomfort is described as:  Deep ache  Pain Ratin/10   Frequency:  intermittent    The pain is exacerbated by walking  Previous treatment: physical therapy, night splint, over-the-counter arch support  *  Past Medical History:   Diagnosis Date     DDD (degenerative disc disease), lumbar       "Esophageal reflux      Helicobacter pylori (H. pylori)      Irritable bowel syndrome      Low back pain      Other specified psychosexual disorder      Pure hypercholesterolemia    *  *  Past Surgical History:   Procedure Laterality Date     NO HISTORY OF SURGERY     *  *  Current Outpatient Medications   Medication Sig Dispense Refill     cholecalciferol (VITAMIN D) 1000 UNIT tablet Take 1,000 Units by mouth daily Vitamin d 3 100 tablet 3     diclofenac (VOLTAREN) 1 % topical gel        diclofenac (VOLTAREN) 75 MG EC tablet        gabapentin (NEURONTIN) 100 MG capsule        gabapentin (NEURONTIN) 300 MG capsule            EXAM:    Vitals: /70   Ht 1.664 m (5' 5.5\")   Wt 76.2 kg (168 lb)   BMI 27.53 kg/m    BMI: Body mass index is 27.53 kg/m .    Constitutional:  Callum Haywood is in no apparent distress, appears well-nourished.  Cooperative with history and physical exam.    Vascular:  Pedal pulses are palpable for both the DP and PT arteries.  CFT < 3 sec.  No edema.      Neuro: Light touch sensation is intact to the L4, L5, S1 distributions  No evidence of weakness, spasticity, or contracture in the lower extremities.     Derm: Normal texture and turgor.  No erythema, ecchymosis, or cyanosis.  No open lesions.     Musculoskeletal:    Lower extremity muscle strength is normal. No gross deformities.  Pain on palpation to the plantar medial aspect of the left heel.  No significant pain with side-to-side compression of the heel.  No nodularity noted.      "

## 2021-06-20 NOTE — LETTER
Letter by Camryn Raman RN at      Author: Camryn Raman RN Service: -- Author Type: --    Filed:  Encounter Date: 7/2/2020 Status: (Other)       7/2/2020        Callum Haywood  [address]  [place] [address]    This letter provides a written record that you were tested for COVID-19 on 7/1/20.     Your result was negative. This means that we didnt find the virus that causes COVID-19 in your sample. A test may show negative when you do actually have the virus. This can happen when the virus is in the early stages of infection, before you feel illness symptoms.    If you have symptoms   Stay home and away from others (self-isolate) until you meet ALL of the guidelines below:    Youve had no fever--and no medicine that reduces fever--for 3 full days (72 hours). And ?    Your other symptoms have gotten better. For example, your cough or breathing has improved. And?    At least 10 days have passed since your symptoms started.    During this time:    Stay home. Dont go to work, school or anywhere else.     Stay in your own room, including for meals. Use your own bathroom if you can.    Stay away from others in your home. No hugging, kissing or shaking hands. No visitors.    Clean high touch surfaces often (doorknobs, counters, handles, etc.). Use a household cleaning spray or wipes. You can find a full list on the EPA website at www.epa.gov/pesticide-registration/list-n-disinfectants-use-against-sars-cov-2.    Cover your mouth and nose with a mask, tissue or washcloth to avoid spreading germs.    Wash your hands and face often with soap and water.    Going back to work  Check with your employer for any guidelines to follow for going back to work.    Employers: This document serves as formal notice that your employee tested negative for COVID-19, as of the testing date shown above.

## 2021-06-20 NOTE — LETTER
Letter by Camryn Raman RN at      Author: Camryn Raman RN Service: -- Author Type: --    Filed:  Encounter Date: 7/2/2020 Status: (Other)       7/2/2020        Callum Haywood  [address]  [place] [address]    This letter provides a written record that you were tested for COVID-19 on 7/1/20.     Your result was negative. This means that we didnt find the virus that causes COVID-19 in your sample. A test may show negative when you do actually have the virus. This can happen when the virus is in the early stages of infection, before you feel illness symptoms.    If you have symptoms   Stay home and away from others (self-isolate) until you meet ALL of the guidelines below:    Youve had no fever--and no medicine that reduces fever--for 3 full days (72 hours). And ?    Your other symptoms have gotten better. For example, your cough or breathing has improved. And?    At least 10 days have passed since your symptoms started.    During this time:    Stay home. Dont go to work, school or anywhere else.     Stay in your own room, including for meals. Use your own bathroom if you can.    Stay away from others in your home. No hugging, kissing or shaking hands. No visitors.    Clean high touch surfaces often (doorknobs, counters, handles, etc.). Use a household cleaning spray or wipes. You can find a full list on the EPA website at www.epa.gov/pesticide-registration/list-n-disinfectants-use-against-sars-cov-2.    Cover your mouth and nose with a mask, tissue or washcloth to avoid spreading germs.    Wash your hands and face often with soap and water.    Going back to work  Check with your employer for any guidelines to follow for going back to work.    Employers: This document serves as formal notice that your employee tested negative for COVID-19, as of the testing date shown above.

## 2021-09-19 ENCOUNTER — HEALTH MAINTENANCE LETTER (OUTPATIENT)
Age: 61
End: 2021-09-19

## 2022-01-01 ENCOUNTER — TRANSFERRED RECORDS (OUTPATIENT)
Dept: MULTI SPECIALTY CLINIC | Facility: CLINIC | Age: 62
End: 2022-01-01

## 2022-01-01 LAB — RETINOPATHY: NORMAL

## 2022-02-11 ENCOUNTER — OFFICE VISIT (OUTPATIENT)
Dept: FAMILY MEDICINE | Facility: CLINIC | Age: 62
End: 2022-02-11
Payer: COMMERCIAL

## 2022-02-11 VITALS
HEIGHT: 66 IN | RESPIRATION RATE: 16 BRPM | OXYGEN SATURATION: 98 % | SYSTOLIC BLOOD PRESSURE: 110 MMHG | HEART RATE: 62 BPM | WEIGHT: 173 LBS | TEMPERATURE: 97.5 F | BODY MASS INDEX: 27.8 KG/M2 | DIASTOLIC BLOOD PRESSURE: 80 MMHG

## 2022-02-11 DIAGNOSIS — N50.812 LEFT TESTICULAR PAIN: Primary | ICD-10-CM

## 2022-02-11 DIAGNOSIS — S76.212A INGUINAL STRAIN, LEFT, INITIAL ENCOUNTER: ICD-10-CM

## 2022-02-11 PROCEDURE — 99213 OFFICE O/P EST LOW 20 MIN: CPT | Performed by: FAMILY MEDICINE

## 2022-02-11 ASSESSMENT — MIFFLIN-ST. JEOR: SCORE: 1534.53

## 2022-02-11 NOTE — PATIENT INSTRUCTIONS
My suspicion is that your symptoms are related to strain of the suspensory ligaments to which the testicles are attached.  Your exam shows no evidence of tenderness of the testicles themselves.  I do not find any masses on the testicles nor any hernias.      I think that it would be worth trying to wear supportive underwear or compression shorts while running.  If you are able to avoid running in the extreme cold, I think that could also help.      I do think that your discomfort will gradually improve as the next few weeks go by.      For particularly bad days, you could reasonably take 2 tablets of Advil (ibuprofen) up to every 6 hours.

## 2022-02-11 NOTE — PROGRESS NOTES
"*patient requests that son act as  for today's visit. Son was present and translated throughout today's visit, except for the physical exam.     Assessment & Plan     Left testicular pain    Inguinal strain, left, initial encounter    Patient with acute activity- and cold-related left pubic and scrotal discomfort persistent for the past few weeks.  Examination reveals no testicular tenderness or scrotal masses or hernias.  Patient is tender at pubic bone L>R, which leads me to believe that he likely has left inguinal strain. Reviewed role of rest from running with resumption of exercise in a warmer (indoor or if temps are warmer, outside) environment.  Reassurance given regarding absence of hernia today.     21 minutes spent on the date of the encounter doing chart review, history and exam, documentation and further activities per the note.     BMI:   Estimated body mass index is 28.35 kg/m  as calculated from the following:    Height as of this encounter: 1.664 m (5' 5.5\").    Weight as of this encounter: 78.5 kg (173 lb).   Weight management plan: Discussed healthy diet and exercise guidelines    Patient Instructions   My suspicion is that your symptoms are related to strain of the inguinal ligaments in the groin.  Your exam shows no evidence of tenderness of the testicles themselves.  I do not find any masses on the testicles nor any hernias.      I think that it would be worth trying to wear supportive underwear or compression shorts while running.  If you are able to avoid running in the extreme cold, I think that could also help.      I do think that your discomfort will gradually improve with rest as the next few weeks go by.      For particularly bad days, you could reasonably take 2 tablets of Advil (ibuprofen) up to every 6 hours.     Return in about 3 weeks (around 3/4/2022), or if symptoms worsen or fail to improve.    Daniel Trinidad MD  [facility]    Subjective "   Callum is a 61 year old who presents for the following health issues  accompanied by his son.    HPI   Patient reports acute onset of testicular/pubic pain 3 weeks ago.  Initially noted after being outside running on a very cold day.  First day felt that there was radiation into the legs, though the leg pain has not persisted.  Patient now feels pain primarily in the pubic area just superior to the penis, bilaterally, with radiation into the scrotum.  Symptoms most noticeable with running or when out in the cold or with coughing.  No bulges noted with coughing.  No recent changes in bowel habits. Patient denies history of similar symptoms in the past. Discomfort is not typically noticed at rest.  He has though that the left testicle was sometimes more tender than normal recently.   Patient denies any associated changes in urinary frequency.  No recent dysuria, urinary hesitancy, nor hematuria noted.     Answers for HPI/ROS submitted by the patient on 2/11/2022  How many servings of fruits and vegetables do you eat daily?: 0-1  On average, how many sweetened beverages do you drink each day (Examples: soda, juice, sweet tea, etc.  Do NOT count diet or artificially sweetened beverages)?: 0  How many minutes a day do you exercise enough to make your heart beat faster?: 20 to 29  How many days a week do you exercise enough to make your heart beat faster?: 4  How many days per week do you miss taking your medication?: 0    Testicular pain  Onset/Duration: few weeks, was running when really cold and noticed pain in thighs down to legs and felt groin pain   Description:   Dysuria (painful urination): no  Hematuria (blood in urine): no  Frequency: no  Waking at night to urinate: no  Hesitancy (delay in urine): no  Retention (unable to empty): no  Decrease in urinary flow: no  Incontinence: no  Progression of Symptoms:  waxing and waning  Accompanying Signs & Symptoms:  Fever: no  Back/Flank pain: no  Urethral discharge:  "no  Testicle lumps/masses/pain: YES- pressure gladis. When walking and coughing   Nausea and/or vomiting: no  Abdominal pain: no  History:   History of frequent UTI s: no  History of kidney stones: no  History of hernias: no  Personal or Family history of Prostate problems: no  Sexually active: no  Precipitating or alleviating factors: None  Therapies tried and outcome: none      Objective    /80 (BP Location: Right arm, Patient Position: Sitting, Cuff Size: Adult Regular)   Pulse 62   Temp 97.5  F (36.4  C) (Temporal)   Resp 16   Ht 1.664 m (5' 5.5\")   Wt 78.5 kg (173 lb)   SpO2 98%   BMI 28.35 kg/m    Body mass index is 28.35 kg/m .  Physical Exam   GENERAL: healthy, alert and no distress   (male): testicles normal without tenderness, atrophy, or masses, no hernias, penis normal without urethral discharge. No scrotal swelling, tenderness or masses.  Tenderness noted at the anterior pubic bone bilaterally, left> right.    Abdomen: No abdominal tenderness noted.  MS: no gross musculoskeletal defects noted, no edema  SKIN: no suspicious lesions or rashes      Daniel Trinidad MD   Family medicine   [facility]      "

## 2022-02-28 ENCOUNTER — OFFICE VISIT (OUTPATIENT)
Dept: FAMILY MEDICINE | Facility: CLINIC | Age: 62
End: 2022-02-28
Payer: COMMERCIAL

## 2022-02-28 VITALS
BODY MASS INDEX: 28.56 KG/M2 | HEIGHT: 65 IN | WEIGHT: 171.4 LBS | SYSTOLIC BLOOD PRESSURE: 124 MMHG | DIASTOLIC BLOOD PRESSURE: 74 MMHG | HEART RATE: 57 BPM | TEMPERATURE: 97 F | OXYGEN SATURATION: 98 %

## 2022-02-28 DIAGNOSIS — E78.5 HYPERLIPIDEMIA LDL GOAL <70: ICD-10-CM

## 2022-02-28 DIAGNOSIS — E11.9 TYPE 2 DIABETES MELLITUS WITHOUT COMPLICATION, WITHOUT LONG-TERM CURRENT USE OF INSULIN (H): ICD-10-CM

## 2022-02-28 DIAGNOSIS — Z00.00 ROUTINE GENERAL MEDICAL EXAMINATION AT A HEALTH CARE FACILITY: Primary | ICD-10-CM

## 2022-02-28 LAB
ALBUMIN SERPL-MCNC: 3.3 G/DL (ref 3.4–5)
ALP SERPL-CCNC: 67 U/L (ref 40–150)
ALT SERPL W P-5'-P-CCNC: 30 U/L (ref 0–70)
ANION GAP SERPL CALCULATED.3IONS-SCNC: 7 MMOL/L (ref 3–14)
AST SERPL W P-5'-P-CCNC: 17 U/L (ref 0–45)
BILIRUB SERPL-MCNC: 0.3 MG/DL (ref 0.2–1.3)
BUN SERPL-MCNC: 15 MG/DL (ref 7–30)
CALCIUM SERPL-MCNC: 8.7 MG/DL (ref 8.5–10.1)
CHLORIDE BLD-SCNC: 106 MMOL/L (ref 94–109)
CHOLEST SERPL-MCNC: 220 MG/DL
CO2 SERPL-SCNC: 25 MMOL/L (ref 20–32)
CREAT SERPL-MCNC: 0.74 MG/DL (ref 0.66–1.25)
ERYTHROCYTE [DISTWIDTH] IN BLOOD BY AUTOMATED COUNT: 11.8 % (ref 10–15)
FASTING STATUS PATIENT QL REPORTED: ABNORMAL
FASTING STATUS PATIENT QL REPORTED: ABNORMAL
GFR SERPL CREATININE-BSD FRML MDRD: >90 ML/MIN/1.73M2
GLUCOSE BLD-MCNC: 144 MG/DL (ref 70–99)
GLUCOSE BLD-MCNC: 144 MG/DL (ref 70–99)
HBA1C MFR BLD: 7.1 % (ref 0–5.6)
HCT VFR BLD AUTO: 42 % (ref 40–53)
HDLC SERPL-MCNC: 47 MG/DL
HGB BLD-MCNC: 14.1 G/DL (ref 13.3–17.7)
LDLC SERPL CALC-MCNC: 147 MG/DL
MCH RBC QN AUTO: 29.9 PG (ref 26.5–33)
MCHC RBC AUTO-ENTMCNC: 33.6 G/DL (ref 31.5–36.5)
MCV RBC AUTO: 89 FL (ref 78–100)
NONHDLC SERPL-MCNC: 173 MG/DL
PLATELET # BLD AUTO: 223 10E3/UL (ref 150–450)
POTASSIUM BLD-SCNC: 4 MMOL/L (ref 3.4–5.3)
PROT SERPL-MCNC: 6.9 G/DL (ref 6.8–8.8)
PSA SERPL-MCNC: 0.75 UG/L (ref 0–4)
RBC # BLD AUTO: 4.72 10E6/UL (ref 4.4–5.9)
SODIUM SERPL-SCNC: 138 MMOL/L (ref 133–144)
TRIGL SERPL-MCNC: 131 MG/DL
WBC # BLD AUTO: 3.9 10E3/UL (ref 4–11)

## 2022-02-28 PROCEDURE — 99396 PREV VISIT EST AGE 40-64: CPT | Performed by: NURSE PRACTITIONER

## 2022-02-28 PROCEDURE — 80053 COMPREHEN METABOLIC PANEL: CPT | Performed by: NURSE PRACTITIONER

## 2022-02-28 PROCEDURE — 99214 OFFICE O/P EST MOD 30 MIN: CPT | Mod: 25 | Performed by: NURSE PRACTITIONER

## 2022-02-28 PROCEDURE — 83036 HEMOGLOBIN GLYCOSYLATED A1C: CPT | Performed by: NURSE PRACTITIONER

## 2022-02-28 PROCEDURE — 36415 COLL VENOUS BLD VENIPUNCTURE: CPT | Performed by: NURSE PRACTITIONER

## 2022-02-28 PROCEDURE — 80061 LIPID PANEL: CPT | Performed by: NURSE PRACTITIONER

## 2022-02-28 PROCEDURE — 85027 COMPLETE CBC AUTOMATED: CPT | Performed by: NURSE PRACTITIONER

## 2022-02-28 PROCEDURE — G0103 PSA SCREENING: HCPCS | Performed by: NURSE PRACTITIONER

## 2022-02-28 ASSESSMENT — ENCOUNTER SYMPTOMS
PARESTHESIAS: 0
ARTHRALGIAS: 0
ABDOMINAL PAIN: 0
EYE PAIN: 0
DYSURIA: 0
JOINT SWELLING: 0
DIARRHEA: 0
DIZZINESS: 0
PALPITATIONS: 0
FEVER: 0
HEMATOCHEZIA: 0
HEARTBURN: 0
HEADACHES: 0
HEMATURIA: 0
NERVOUS/ANXIOUS: 0
CONSTIPATION: 0
CHILLS: 0
WEAKNESS: 0
FREQUENCY: 0
SHORTNESS OF BREATH: 0
SORE THROAT: 0
COUGH: 0
NAUSEA: 0
MYALGIAS: 0

## 2022-02-28 NOTE — PROGRESS NOTES
SUBJECTIVE:   CC: Callum Haywood is an 61 year old male who presents for preventative health visit. Rudy present during exam.    Patient has been advised of split billing requirements and indicates understanding: Yes     61-year-old male with past medical history hyperlipidemia, IBS, GERD, in clinic for preventive health exam.  Patient son Rudy is present during appointment to assist with translation.  Patient has no significant concerns today    Healthy Habits:     Getting at least 3 servings of Calcium per day:  Yes    Bi-annual eye exam:  Yes    Dental care twice a year:  NO    Sleep apnea or symptoms of sleep apnea:  None    Diet:  Regular (no restrictions)    Frequency of exercise:  4-5 days/week    Duration of exercise:  15-30 minutes    Taking medications regularly:  Not Applicable    Medication side effects:  Not applicable    PHQ-2 Total Score: 0    Additional concerns today:  No    Today's PHQ-2 Score:   PHQ-2 ( 1999 Pfizer) 2/28/2022   Q1: Little interest or pleasure in doing things 0   Q2: Feeling down, depressed or hopeless 0   PHQ-2 Score 0   PHQ-2 Total Score (12-17 Years)- Positive if 3 or more points; Administer PHQ-A if positive -   Q1: Little interest or pleasure in doing things Not at all   Q2: Feeling down, depressed or hopeless Not at all   PHQ-2 Score 0       Abuse: Current or Past(Physical, Sexual or Emotional)- No  Do you feel safe in your environment? Yes      Social History     Tobacco Use     Smoking status: Never Smoker     Smokeless tobacco: Never Used   Substance Use Topics     Alcohol use: Yes     Comment: 3 times a month      If you drink alcohol do you typically have >3 drinks per day or >7 drinks per week? No    Alcohol Use 2/28/2022   Prescreen: >3 drinks/day or >7 drinks/week? No   Prescreen: >3 drinks/day or >7 drinks/week? -   No flowsheet data found.    Last PSA:   PSA   Date Value Ref Range Status   02/22/2021 0.49 0 - 4 ug/L Final     Comment:     Assay Method:   Chemiluminescence using Siemens Vista analyzer     Prostate Specific Antigen Screen   Date Value Ref Range Status   02/28/2022 0.75 0.00 - 4.00 ug/L Final       Reviewed orders with patient. Reviewed health maintenance and updated orders accordingly - Yes  Labs reviewed in EPIC  BP Readings from Last 3 Encounters:   02/28/22 124/74   02/11/22 110/80   03/09/21 122/70    Wt Readings from Last 3 Encounters:   02/28/22 77.7 kg (171 lb 6.4 oz)   02/11/22 78.5 kg (173 lb)   03/09/21 76.2 kg (168 lb)                    Reviewed and updated as needed this visit by clinical staff   Tobacco  Allergies  Meds  Problems  Med Hx  Surg Hx  Fam Hx  Soc   Hx        Reviewed and updated as needed this visit by Provider   Tobacco  Allergies  Meds  Problems  Med Hx  Surg Hx  Fam Hx         Past Medical History:   Diagnosis Date     DDD (degenerative disc disease), lumbar      Esophageal reflux      Helicobacter pylori (H. pylori)      Irritable bowel syndrome      Low back pain      Other specified psychosexual disorder      Pure hypercholesterolemia         Review of Systems   Constitutional: Negative for chills and fever.   HENT: Negative for congestion, ear pain, hearing loss and sore throat.    Eyes: Negative for pain and visual disturbance.   Respiratory: Negative for cough and shortness of breath.    Cardiovascular: Negative for chest pain, palpitations and peripheral edema.   Gastrointestinal: Negative for abdominal pain, constipation, diarrhea, heartburn, hematochezia and nausea.   Genitourinary: Positive for genital sores. Negative for dysuria, frequency, hematuria, impotence, penile discharge and urgency.   Musculoskeletal: Negative for arthralgias, joint swelling and myalgias.   Skin: Negative for rash.   Neurological: Negative for dizziness, weakness, headaches and paresthesias.   Psychiatric/Behavioral: Negative for mood changes. The patient is not nervous/anxious.        OBJECTIVE:   /74 (BP Location:  "Left arm, Patient Position: Sitting, Cuff Size: Adult Regular)   Pulse 57   Temp 97  F (36.1  C) (Temporal)   Ht 1.654 m (5' 5.1\")   Wt 77.7 kg (171 lb 6.4 oz)   SpO2 98%   BMI 28.43 kg/m      Physical Exam  GENERAL: healthy, alert and no distress  EYES: Eyes grossly normal to inspection, PERRL and conjunctivae and sclerae normal  HENT: ear canals and TM's normal, nose and mouth without ulcers or lesions  NECK: no adenopathy, no asymmetry, masses, or scars and thyroid normal to palpation  RESP: lungs clear to auscultation - no rales, rhonchi or wheezes  CV: regular rate and rhythm, normal S1 S2, no S3 or S4, no murmur, click or rub, no peripheral edema and peripheral pulses strong  ABDOMEN: soft, nontender, no hepatosplenomegaly, no masses and bowel sounds normal  MS: no gross musculoskeletal defects noted, no edema  SKIN: no suspicious lesions or rashes  NEURO: Normal strength and tone, mentation intact and speech normal  PSYCH: mentation appears normal, affect normal/bright    Diagnostic Test Results:  Results for orders placed or performed in visit on 02/28/22   GLUCOSE     Status: Abnormal   Result Value Ref Range    Glucose 144 (H) 70 - 99 mg/dL    Patient Fasting > 8hrs? Unknown    CBC with platelets     Status: Abnormal   Result Value Ref Range    WBC Count 3.9 (L) 4.0 - 11.0 10e3/uL    RBC Count 4.72 4.40 - 5.90 10e6/uL    Hemoglobin 14.1 13.3 - 17.7 g/dL    Hematocrit 42.0 40.0 - 53.0 %    MCV 89 78 - 100 fL    MCH 29.9 26.5 - 33.0 pg    MCHC 33.6 31.5 - 36.5 g/dL    RDW 11.8 10.0 - 15.0 %    Platelet Count 223 150 - 450 10e3/uL   Comprehensive metabolic panel     Status: Abnormal   Result Value Ref Range    Sodium 138 133 - 144 mmol/L    Potassium 4.0 3.4 - 5.3 mmol/L    Chloride 106 94 - 109 mmol/L    Carbon Dioxide (CO2) 25 20 - 32 mmol/L    Anion Gap 7 3 - 14 mmol/L    Urea Nitrogen 15 7 - 30 mg/dL    Creatinine 0.74 0.66 - 1.25 mg/dL    Calcium 8.7 8.5 - 10.1 mg/dL    Glucose 144 (H) 70 - 99 " mg/dL    Alkaline Phosphatase 67 40 - 150 U/L    AST 17 0 - 45 U/L    ALT 30 0 - 70 U/L    Protein Total 6.9 6.8 - 8.8 g/dL    Albumin 3.3 (L) 3.4 - 5.0 g/dL    Bilirubin Total 0.3 0.2 - 1.3 mg/dL    GFR Estimate >90 >60 mL/min/1.73m2   Hemoglobin A1c     Status: Abnormal   Result Value Ref Range    Hemoglobin A1C 7.1 (H) 0.0 - 5.6 %   Lipid panel reflex to direct LDL Fasting     Status: Abnormal   Result Value Ref Range    Cholesterol 220 (H) <200 mg/dL    Triglycerides 131 <150 mg/dL    Direct Measure HDL 47 >=40 mg/dL    LDL Cholesterol Calculated 147 (H) <=100 mg/dL    Non HDL Cholesterol 173 (H) <130 mg/dL    Patient Fasting > 8hrs? Unknown     Narrative    Cholesterol  Desirable:  <200 mg/dL    Triglycerides  Normal:  Less than 150 mg/dL  Borderline High:  150-199 mg/dL  High:  200-499 mg/dL  Very High:  Greater than or equal to 500 mg/dL    Direct Measure HDL  Female:  Greater than or equal to 50 mg/dL   Male:  Greater than or equal to 40 mg/dL    LDL Cholesterol  Desirable:  <100mg/dL  Above Desirable:  100-129 mg/dL   Borderline High:  130-159 mg/dL   High:  160-189 mg/dL   Very High:  >= 190 mg/dL    Non HDL Cholesterol  Desirable:  130 mg/dL  Above Desirable:  130-159 mg/dL  Borderline High:  160-189 mg/dL  High:  190-219 mg/dL  Very High:  Greater than or equal to 220 mg/dL   PROSTATE SPEC ANTIGEN SCREEN     Status: Normal   Result Value Ref Range    Prostate Specific Antigen Screen 0.75 0.00 - 4.00 ug/L       ASSESSMENT/PLAN:   Callum was seen today for physical.    Diagnoses and all orders for this visit:    Routine general medical examination at a health care facility  Preventive exam completed health maintenance discussed colorectal, prostate cancer screenings, immunizations  Plan: We will obtain annual maintenance labs  -     GLUCOSE; Future  -     CBC with platelets; Future  -     Comprehensive metabolic panel; Future  -     Hemoglobin A1c; Future  -     Lipid panel reflex to direct LDL Fasting;  "Future  -     PROSTATE SPEC ANTIGEN SCREEN; Future  -     PREVENTIVE VISIT,EST,40-64    Type 2 diabetes mellitus without complication, without long-term current use of insulin (H)  New onset DM A1c 7.1%; message sent to patient to follow-up via virtual or face-to-face to discuss treatment  Plan: will start Metformin EGFR and creatinine within normal limits  -My chart message sent to patient to schedule follow-up    Hyperlipidemia LDL goal <70  ; 10-year ASCVD risk score is: 16%; new onset DM type II recommend starting statin once stable on Metformin    Values used to calculate the score:      Age: 61 years      Sex: Male      Is Non- : No      Diabetic: Yes      Tobacco smoker: No      Systolic Blood Pressure: 124 mmHg      Is BP treated: No      HDL Cholesterol: 47 mg/dL      Total Cholesterol: 220 mg/dL  Plan: Statin therapy discussion at next visit    Other orders  -     REVIEW OF HEALTH MAINTENANCE PROTOCOL ORDERS    Patient has been advised of split billing requirements and indicates understanding: Yes    COUNSELING:   Reviewed preventive health counseling, as reflected in patient instructions       Regular exercise       Healthy diet/nutrition       Colorectal cancer screening       Prostate cancer screening    Estimated body mass index is 28.43 kg/m  as calculated from the following:    Height as of this encounter: 1.654 m (5' 5.1\").    Weight as of this encounter: 77.7 kg (171 lb 6.4 oz).     Weight management plan: Discussed healthy diet and exercise guidelines    He reports that he has never smoked. He has never used smokeless tobacco.      Counseling Resources:  ATP IV Guidelines  Pooled Cohorts Equation Calculator  FRAX Risk Assessment  ICSI Preventive Guidelines  Dietary Guidelines for Americans, 2010  USDA's MyPlate  ASA Prophylaxis  Lung CA Screening    [facility]  "

## 2022-02-28 NOTE — PATIENT INSTRUCTIONS
Preventive Health Recommendations  Male Ages 50 - 64    Yearly exam:             See your health care provider every year in order to  o   Review health changes.   o   Discuss preventive care.    o   Review your medicines if your doctor has prescribed any.     Have a cholesterol test every 5 years, or more frequently if you are at risk for high cholesterol/heart disease.     Have a diabetes test (fasting glucose) every three years. If you are at risk for diabetes, you should have this test more often.     Have a colonoscopy at age 50, or have a yearly FIT test (stool test). These exams will check for colon cancer.      Talk with your health care provider about whether or not a prostate cancer screening test (PSA) is right for you.    You should be tested each year for STDs (sexually transmitted diseases), if you re at risk.     Shots: Get a flu shot each year. Get a tetanus shot every 10 years.     Nutrition:    Eat at least 5 servings of fruits and vegetables daily.     Eat whole-grain bread, whole-wheat pasta and brown rice instead of white grains and rice.     Get adequate Calcium and Vitamin D.     Lifestyle    Exercise for at least 150 minutes a week (30 minutes a day, 5 days a week). This will help you control your weight and prevent disease.     Limit alcohol to one drink per day.     No smoking.     Wear sunscreen to prevent skin cancer.     See your dentist every six months for an exam and cleaning.     See your eye doctor every 1 to 2 years.    Patient Education     Patient Education    Diclofenac Epolamine Medicated topical patch    Diclofenac Potassium Oral capsule, liquid filled    Diclofenac Potassium Oral solution    Diclofenac Potassium Oral tablet    Diclofenac Sodium Gastro-resistant tablet    Diclofenac Sodium Ophthalmic drops, solution    Diclofenac Sodium Oral tablet, extended-release    Diclofenac Sodium Solution for injection    Diclofenac Sodium Topical gel    Diclofenac Sodium Topical  solution  Diclofenac Sodium Topical gel  What is this medicine?  DICLOFENAC (dye KLSARAH fen ak) is a non-steroidal anti-inflammatory drug (NSAID). The 1% skin gel is used to treat osteoarthritis of the hands or knees. The 3% skin gel is used to treat actinic keratosis.  This medicine may be used for other purposes; ask your health care provider or pharmacist if you have questions.  What should I tell my health care provider before I take this medicine?  They need to know if you have any of these conditions:    asthma    bleeding problems    coronary artery bypass graft (CABG) surgery within the past 2 weeks    heart disease    high blood pressure    if you frequently drink alcohol containing drinks    kidney disease    liver disease    open or infected skin    stomach problems    an unusual or allergic reaction to diclofenac, aspirin, other NSAIDs, other medicines, benzyl alcohol (3% gel only), foods, dyes, or preservatives    pregnant or trying to get pregnant    breast-feeding  How should I use this medicine?  This medicine is for external use only. Follow the directions on the prescription label. Wash hands before and after use. Do not get this medicine in your eyes. If you do, rinse out with plenty of cool tap water. Use your doses at regular intervals. Do not use your medicine more often than directed.  A special MedGuide will be given to you by the pharmacist with each prescription and refill of the 1% gel. Be sure to read this information carefully each time.  Talk to your pediatrician regarding the use of this medicine in children. Special care may be needed. The 3% gel is not approved for use in children.  Overdosage: If you think you have taken too much of this medicine contact a poison control center or emergency room at once.  NOTE: This medicine is only for you. Do not share this medicine with others.  What if I miss a dose?  If you miss a dose, use it as soon as you can. If it is almost time for your  next dose, use only that dose. Do not use double or extra doses.  What may interact with this medicine?    aspirin    NSAIDs, medicines for pain and inflammation, like ibuprofen or naproxen  Do not use any other skin products without telling your doctor or health care professional.  This list may not describe all possible interactions. Give your health care provider a list of all the medicines, herbs, non-prescription drugs, or dietary supplements you use. Also tell them if you smoke, drink alcohol, or use illegal drugs. Some items may interact with your medicine.  What should I watch for while using this medicine?  Tell your doctor or healthcare professional if your symptoms do not start to get better or if they get worse. You will need to follow up with your health care provider to monitor your progress. You may need to be treated for up to 3 months if you are using the 3% gel, but the full effect may not occur until 1 month after stopping treatment. If you develop a severe skin reaction, contact your doctor or health care professional immediately.  This medicine can make you more sensitive to the sun. Keep out of the sun. If you cannot avoid being in the sun, wear protective clothing and use sunscreen. Do not use sun lamps or tanning beds/booths.  Do not take medicines such as ibuprofen and naproxen with this medicine. Side effects such as stomach upset, nausea, or ulcers may be more likely to occur. Many medicines available without a prescription should not be taken with this medicine.  This medicine does not prevent heart attack or stroke. In fact, this medicine may increase the chance of a heart attack or stroke. The chance may increase with longer use of this medicine and in people who have heart disease. If you take aspirin to prevent heart attack or stroke, talk with your doctor or health care professional.  This medicine can cause ulcers and bleeding in the stomach and intestines at any time during  treatment. Do not smoke cigarettes or drink alcohol. These increase irritation to your stomach and can make it more susceptible to damage from this medicine. Ulcers and bleeding can happen without warning symptoms and can cause death.  You may get drowsy or dizzy. Do not drive, use machinery, or do anything that needs mental alertness until you know how this medicine affects you. Do not stand or sit up quickly, especially if you are an older patient. This reduces the risk of dizzy or fainting spells.  This medicine can cause you to bleed more easily. Try to avoid damage to your teeth and gums when you brush or floss your teeth.  What side effects may I notice from receiving this medicine?  Side effects that you should report to your doctor or health care professional as soon as possible:    allergic reactions like skin rash, itching or hives, swelling of the face, lips, or tongue    black or bloody stools, blood in the urine or vomit    blurred vision    chest pain    difficulty breathing or wheezing    nausea or vomiting    redness, blistering, peeling or loosening of the skin, including inside the mouth    slurred speech or weakness on one side of the body    trouble passing urine or change in the amount of urine    unexplained weight gain or swelling    unusually weak or tired    yellowing of eyes or skin  Side effects that usually do not require medical attention (report to your doctor or health care professional if they continue or are bothersome):    dizziness    dry skin    headache    heartburn    increased sensitivity to the sun    stomach pain    tingling at the application site  This list may not describe all possible side effects. Call your doctor for medical advice about side effects. You may report side effects to FDA at 9-157-FDA-4204.  Where should I keep my medicine?  Keep out of the reach of children.  Store the 1% gel at room temperature between 15 and 30 degrees C (59 and 86 degrees F). Store the  3% gel at room temperature between 20 and 25 degrees C (68 and 77 degrees F). Protect from light. Throw away any unused medicine after the expiration date.  NOTE:This sheet is a summary. It may not cover all possible information. If you have questions about this medicine, talk to your doctor, pharmacist, or health care provider. Copyright  2016 Gold Standard           Patient Education     What Is Arthritis?  Arthritis is a disease that affects the joints. Joints are the parts where bones meet and move. It can affect any joint in your body. There are more than 100 types of arthritis. They include:      Osteoarthritis    Rheumatoid arthritis    Gout    Lupus  If your symptoms are mild, medicines may be enough to ease pain and swelling. For more severe arthritis, you may need surgery. This can improve the condition of the joint. Or it can replace part or all of the joint.       What causes arthritis?  Cartilage is a smooth substance that protects the ends of your bones and provides cushioning. When you have arthritis, this cartilage breaks down and can no longer protect your bones. This can happen from an autoimmune disease or it can happen from wear and tear, infections, or trauma. The bones rub against each other, causing pain and swelling. Over time, small pieces of rough or splintered bone (bone spurs) may develop. The joint's range of motion can become limited.   Symptoms  Some of the more common symptoms of arthritis include:     Joint pain and stiffness. These symptoms get worse with long periods of rest. They may also get worse from using a joint too long or too hard.    Joints that have lost normal shape and motion. They may look swollen and be hard to move.    Sore, inflamed joints. They may look red and feel warm.    Grinding or popping noise. This happens with joint movement.    Tiredness (fatigue). You may feel tired all the time.  Reducing symptoms  You can help ease symptoms in these ways:    Losing  weight    Exercising    Strengthening muscles around the joint to reduce the strain on the joint    Using hot and cold packs on your joints    Using over-the-counter and prescription medicines  Talk with your healthcare provider about the best treatments for your condition.   Magdy last reviewed this educational content on 7/1/2019 2000-2021 [employer]. All rights reserved. This information is not intended as a substitute for professional medical care. Always follow your healthcare professional's instructions.

## 2022-03-02 PROBLEM — E11.9 DIABETES MELLITUS, TYPE 2 (H): Status: ACTIVE | Noted: 2022-03-02

## 2022-04-06 ENCOUNTER — OFFICE VISIT (OUTPATIENT)
Dept: URGENT CARE | Facility: URGENT CARE | Age: 62
End: 2022-04-06
Payer: COMMERCIAL

## 2022-04-06 VITALS
WEIGHT: 174 LBS | OXYGEN SATURATION: 97 % | TEMPERATURE: 97.5 F | BODY MASS INDEX: 28.99 KG/M2 | HEIGHT: 65 IN | RESPIRATION RATE: 22 BRPM | HEART RATE: 86 BPM | DIASTOLIC BLOOD PRESSURE: 72 MMHG | SYSTOLIC BLOOD PRESSURE: 118 MMHG

## 2022-04-06 DIAGNOSIS — J34.89 RHINORRHEA: Primary | ICD-10-CM

## 2022-04-06 DIAGNOSIS — R05.8 DRY COUGH: ICD-10-CM

## 2022-04-06 PROCEDURE — 99213 OFFICE O/P EST LOW 20 MIN: CPT | Performed by: NURSE PRACTITIONER

## 2022-04-06 NOTE — LETTER
[facility] [place]  [address] [place] [address]  Phone: [phone]        2022    Callum Haywood  [address]  [place] [address]  [phone] (home)     :     1960      To Whom it May Concern:    This patient was seen in clinic today for acute visit. Please excuse medical related absences. May return to work as tolerated.    Please contact me for questions or concerns.    Sincerely,    Amberly Franklin NP

## 2022-04-19 ENCOUNTER — OFFICE VISIT (OUTPATIENT)
Dept: FAMILY MEDICINE | Facility: CLINIC | Age: 62
End: 2022-04-19
Payer: COMMERCIAL

## 2022-04-19 VITALS
HEIGHT: 66 IN | WEIGHT: 168 LBS | RESPIRATION RATE: 16 BRPM | BODY MASS INDEX: 27 KG/M2 | HEART RATE: 62 BPM | OXYGEN SATURATION: 97 % | SYSTOLIC BLOOD PRESSURE: 130 MMHG | DIASTOLIC BLOOD PRESSURE: 78 MMHG | TEMPERATURE: 98.3 F

## 2022-04-19 DIAGNOSIS — M54.42 CHRONIC BILATERAL LOW BACK PAIN WITH BILATERAL SCIATICA: Primary | ICD-10-CM

## 2022-04-19 DIAGNOSIS — M54.41 CHRONIC BILATERAL LOW BACK PAIN WITH BILATERAL SCIATICA: Primary | ICD-10-CM

## 2022-04-19 DIAGNOSIS — F41.9 ANXIETY: ICD-10-CM

## 2022-04-19 DIAGNOSIS — G89.29 CHRONIC BILATERAL LOW BACK PAIN WITH BILATERAL SCIATICA: Primary | ICD-10-CM

## 2022-04-19 PROCEDURE — 96127 BRIEF EMOTIONAL/BEHAV ASSMT: CPT | Mod: 59 | Performed by: NURSE PRACTITIONER

## 2022-04-19 PROCEDURE — 99215 OFFICE O/P EST HI 40 MIN: CPT | Performed by: NURSE PRACTITIONER

## 2022-04-19 ASSESSMENT — ANXIETY QUESTIONNAIRES
3. WORRYING TOO MUCH ABOUT DIFFERENT THINGS: NEARLY EVERY DAY
GAD7 TOTAL SCORE: 12
2. NOT BEING ABLE TO STOP OR CONTROL WORRYING: NEARLY EVERY DAY
IF YOU CHECKED OFF ANY PROBLEMS ON THIS QUESTIONNAIRE, HOW DIFFICULT HAVE THESE PROBLEMS MADE IT FOR YOU TO DO YOUR WORK, TAKE CARE OF THINGS AT HOME, OR GET ALONG WITH OTHER PEOPLE: VERY DIFFICULT
1. FEELING NERVOUS, ANXIOUS, OR ON EDGE: SEVERAL DAYS
6. BECOMING EASILY ANNOYED OR IRRITABLE: NOT AT ALL
5. BEING SO RESTLESS THAT IT IS HARD TO SIT STILL: MORE THAN HALF THE DAYS
7. FEELING AFRAID AS IF SOMETHING AWFUL MIGHT HAPPEN: NOT AT ALL

## 2022-04-19 ASSESSMENT — PATIENT HEALTH QUESTIONNAIRE - PHQ9
SUM OF ALL RESPONSES TO PHQ QUESTIONS 1-9: 6
5. POOR APPETITE OR OVEREATING: NEARLY EVERY DAY

## 2022-04-19 NOTE — PROGRESS NOTES
Assessment & Plan     (M54.42,  M54.41,  G89.29) Chronic bilateral low back pain with bilateral sciatica  (primary encounter diagnosis)  Comment:   Plan: diclofenac (VOLTAREN) 50 MG EC tablet        Discussed the use and indication of this medication as well as potential side effects.  If symptoms are not improving, follow up with Dr. Hayes.     (F41.9) Anxiety  Comment:   Plan: Adult Mental Health  Referral        Letter written for 2 weeks off of work, discussed contacting human resources for any paperwork needed.  Referral to therapy given.  Follow up via e-visit in 2 weeks.         46 minutes spent on the date of the encounter doing chart review, patient visit and documentation            Return in about 2 weeks (around 5/3/2022) for using a [email or web address].    Stephie Lewis NP  [facility]    Tata Nolen is a 61 year old who presents for the following health issues  accompanied by his son.    History of Present Illness       Reason for visit:  Bad mental health conditions from stress and worry.  Symptom onset:  More than a month  Symptom intensity:  Moderate  Symptom progression:  Worsening  Had these symptoms before:  No  What makes it worse:  Hearing and seeing bad things about the war.  What makes it better:  Hearing good news    He eats 2-3 servings of fruits and vegetables daily.He consumes 0 sweetened beverage(s) daily.He exercises with enough effort to increase his heart rate 10 to 19 minutes per day.  He exercises with enough effort to increase his heart rate 4 days per week.   He is taking medications regularly.       He had a dry nose, cough, and itchy eyes.  Delsym helped.  Symptoms have almost resolved.     He continues to have low back pain and bilateral thigh pain.   He did have a lumbar MRI in 2020, saw specialist, Dr. Hayes.  He did have two YANIRA, did not get relief.  He has gone to PT and continues to do home exercises.  He has tried Voltaren,  "Gabapentin.  He thinks he got partial relief from Voltaren for awhile.    He has been under significant stress due to conflict and atrocities in [place] [facility], where he is from.  His family is still there and he is very close to them.  He has not been able to reach them by phone.  He is seeing terrible videos on social media of what is happening to citizens there.  This is causing him much anxiety, it is difficult for him to sleep, eat and focus at work.  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  He feels that he is not able to safely focus on his job and wonders about taking some time off.          Review of Systems         Objective    /78   Pulse 62   Temp 98.3  F (36.8  C) (Temporal)   Resp 16   Ht 1.664 m (5' 5.5\")   Wt 76.2 kg (168 lb)   SpO2 97%   BMI 27.53 kg/m    Body mass index is 27.53 kg/m .  Physical Exam   GENERAL: healthy, alert and no distress  PSYCH: mentation appears normal, affect anxious; NAZARIO-7 score of 12                "

## 2022-04-19 NOTE — LETTER
April 19, 2022      Callum GARCIA Yobany  [address]  [place] [address]        To Whom It May Concern,      Callum was seen in clinic today.  He will need to be off of work for the next two weeks due to a medical condition.           Sincerely,        Stephie Lewis, SEGUNDO, CNP

## 2022-04-20 ASSESSMENT — ANXIETY QUESTIONNAIRES: GAD7 TOTAL SCORE: 12

## 2022-05-02 ENCOUNTER — TELEPHONE (OUTPATIENT)
Dept: FAMILY MEDICINE | Facility: CLINIC | Age: 62
End: 2022-05-02
Payer: COMMERCIAL

## 2022-05-02 ENCOUNTER — E-VISIT (OUTPATIENT)
Dept: FAMILY MEDICINE | Facility: CLINIC | Age: 62
End: 2022-05-02
Payer: COMMERCIAL

## 2022-05-02 DIAGNOSIS — F41.9 ANXIETY: Primary | ICD-10-CM

## 2022-05-02 PROCEDURE — 99422 OL DIG E/M SVC 11-20 MIN: CPT | Performed by: NURSE PRACTITIONER

## 2022-05-02 ASSESSMENT — ANXIETY QUESTIONNAIRES
1. FEELING NERVOUS, ANXIOUS, OR ON EDGE: SEVERAL DAYS
7. FEELING AFRAID AS IF SOMETHING AWFUL MIGHT HAPPEN: SEVERAL DAYS
3. WORRYING TOO MUCH ABOUT DIFFERENT THINGS: SEVERAL DAYS
8. IF YOU CHECKED OFF ANY PROBLEMS, HOW DIFFICULT HAVE THESE MADE IT FOR YOU TO DO YOUR WORK, TAKE CARE OF THINGS AT HOME, OR GET ALONG WITH OTHER PEOPLE?: VERY DIFFICULT
2. NOT BEING ABLE TO STOP OR CONTROL WORRYING: NEARLY EVERY DAY
GAD7 TOTAL SCORE: 8
6. BECOMING EASILY ANNOYED OR IRRITABLE: NOT AT ALL
GAD7 TOTAL SCORE: 8
4. TROUBLE RELAXING: MORE THAN HALF THE DAYS
GAD7 TOTAL SCORE: 8
7. FEELING AFRAID AS IF SOMETHING AWFUL MIGHT HAPPEN: SEVERAL DAYS
5. BEING SO RESTLESS THAT IT IS HARD TO SIT STILL: NOT AT ALL

## 2022-05-02 ASSESSMENT — PATIENT HEALTH QUESTIONNAIRE - PHQ9
SUM OF ALL RESPONSES TO PHQ QUESTIONS 1-9: 6
10. IF YOU CHECKED OFF ANY PROBLEMS, HOW DIFFICULT HAVE THESE PROBLEMS MADE IT FOR YOU TO DO YOUR WORK, TAKE CARE OF THINGS AT HOME, OR GET ALONG WITH OTHER PEOPLE: VERY DIFFICULT
SUM OF ALL RESPONSES TO PHQ QUESTIONS 1-9: 6

## 2022-05-02 NOTE — TELEPHONE ENCOUNTER
Patient called asking about his follow up from appt on 4/19/22. Advised patient to submit and eVisit as directed in appt notes.     Shannan Acosta RN  [facility]

## 2022-05-03 ENCOUNTER — TELEPHONE (OUTPATIENT)
Dept: FAMILY MEDICINE | Facility: CLINIC | Age: 62
End: 2022-05-03
Payer: COMMERCIAL

## 2022-05-03 ASSESSMENT — ANXIETY QUESTIONNAIRES: GAD7 TOTAL SCORE: 8

## 2022-05-03 ASSESSMENT — PATIENT HEALTH QUESTIONNAIRE - PHQ9: SUM OF ALL RESPONSES TO PHQ QUESTIONS 1-9: 6

## 2022-05-03 NOTE — TELEPHONE ENCOUNTER
Reason for Call:  Form, our goal is to have forms completed with 72 hours, however, some forms may require a visit or additional information.    Type of letter, form or note:  disability    Who is the form from?: Patient    Where did the form come from: Patient or family brought in       What clinic location was the form placed at?: [facility]    Where the form was placed: [facility]     What number is listed as a contact on the form?: [phone]       Additional comments: patient requesting form to be needed by 5/6 is asking to be contacted when form has been faxed and wants to come  original     Call taken on 5/3/2022 at 12:09 PM by Clarice Galvan

## 2022-05-04 NOTE — TELEPHONE ENCOUNTER
Faxed to [phone]  Copy sent to abstract  Copy kept in clinic  Patient informed original is at  for pickup

## 2022-05-16 ENCOUNTER — TELEPHONE (OUTPATIENT)
Dept: FAMILY MEDICINE | Facility: CLINIC | Age: 62
End: 2022-05-16
Payer: COMMERCIAL

## 2022-05-16 DIAGNOSIS — F41.9 ANXIETY: Primary | ICD-10-CM

## 2022-05-16 DIAGNOSIS — F32.A DEPRESSION, UNSPECIFIED DEPRESSION TYPE: ICD-10-CM

## 2022-05-16 NOTE — TELEPHONE ENCOUNTER
Emmy-Please review and sign if agree.  Referral pended for therapy/conseling.    Thank you!  MAGALY StanleyN, RN  [facility]

## 2022-05-18 ENCOUNTER — TELEPHONE (OUTPATIENT)
Dept: FAMILY MEDICINE | Facility: CLINIC | Age: 62
End: 2022-05-18
Payer: COMMERCIAL

## 2022-05-18 NOTE — TELEPHONE ENCOUNTER
Since mental health referrals can be very specific to different insurance, I don't have a particular recommendation for a specific therapist.

## 2022-05-18 NOTE — TELEPHONE ENCOUNTER
Emym -     Patient's son Rudy calling, they have the mental health referral information but he would like to know if you have recommendations for a therapist.     Rudy would like would like a return call either way.    Shannan Acosta RN  [facility]

## 2022-05-19 NOTE — TELEPHONE ENCOUNTER
Left message to call back and ask to speak with an available triage nurse.    LONDON Funez RN  [facility]

## 2022-05-25 NOTE — TELEPHONE ENCOUNTER
Pt's son, Rudy, is calling.    Pt is calling about Short Term Disability paperwork under media dated 5/13/22.    They are wanting the paperwork dated to return to work on 8/1/22. They want this extended from 6/6/22.  Pt is needing more time for mental health. He is just starting therapy today.  Let Rudy know if a new copy is needed.    Will send this request to Emmy Lewis.  Ok to handle the completed form as before-  Faxed to [phone]  Copy sent to abstract  Copy kept in clinic  Patient informed original is at  for pickup.      RADHA Giles

## 2022-05-27 NOTE — TELEPHONE ENCOUNTER
Please let him know that I am out of the office until 6/6 and will complete the form sometime during the week when I return.

## 2022-06-25 ENCOUNTER — HEALTH MAINTENANCE LETTER (OUTPATIENT)
Age: 62
End: 2022-06-25

## 2022-07-26 ENCOUNTER — TELEPHONE (OUTPATIENT)
Dept: FAMILY MEDICINE | Facility: CLINIC | Age: 62
End: 2022-07-26

## 2022-07-26 NOTE — TELEPHONE ENCOUNTER
Reason for Call:  Form, our goal is to have forms completed with 72 hours, however, some forms may require a visit or additional information.    Type of letter, form or note:  disability    Who is the form from?: Patient    Where did the form come from: Patient or family brought in       What clinic location was the form placed at?: [facility]    Where the form was placed: Dr. David's form folder [facility]    What number is listed as a contact on the form?: N/A       Additional comments: Routing to [facility] in Emmy's absence. Per form cover sheet patient needs forms completed by 8/1. Please advise if unable to complete, or if a virtual is needed.    Call taken on 7/26/2022 at 8:54 AM by Teresa Trejo

## 2022-07-26 NOTE — TELEPHONE ENCOUNTER
This form needs to be completed by PCP. Will hold for Emmy Lewis. Would also recommend making appt with PCP to discuss form. Will place form in Emmy's folder.    Jani David, DO

## 2022-08-01 NOTE — TELEPHONE ENCOUNTER
Patients son called to inquire about the status of this form. Would like a call back once this is done and have it sent to the location specified on the cover sheet. Thanks!

## 2022-08-02 ENCOUNTER — MYC MEDICAL ADVICE (OUTPATIENT)
Dept: FAMILY MEDICINE | Facility: CLINIC | Age: 62
End: 2022-08-02

## 2022-08-03 ENCOUNTER — MYC MEDICAL ADVICE (OUTPATIENT)
Dept: FAMILY MEDICINE | Facility: CLINIC | Age: 62
End: 2022-08-03

## 2022-08-05 NOTE — TELEPHONE ENCOUNTER
See multiple other TE's and myCharts regarding this, advised pt in 8/2 myChart, this will need to be addressed in visit    MAGALY DesirN RN  [facility]

## 2022-08-05 NOTE — TELEPHONE ENCOUNTER
Responded to patient via [employer]    See 7/26 MAGALY Watson, BSN RN  [facility]

## 2022-08-09 ENCOUNTER — VIRTUAL VISIT (OUTPATIENT)
Dept: FAMILY MEDICINE | Facility: CLINIC | Age: 62
End: 2022-08-09
Payer: COMMERCIAL

## 2022-08-09 ENCOUNTER — TRANSFERRED RECORDS (OUTPATIENT)
Dept: FAMILY MEDICINE | Facility: CLINIC | Age: 62
End: 2022-08-09

## 2022-08-09 DIAGNOSIS — F43.23 ADJUSTMENT DISORDER WITH MIXED ANXIETY AND DEPRESSED MOOD: Primary | ICD-10-CM

## 2022-08-09 PROCEDURE — 99215 OFFICE O/P EST HI 40 MIN: CPT | Mod: 95 | Performed by: NURSE PRACTITIONER

## 2022-08-09 NOTE — LETTER
August 9, 2022      Callum GARCIA Yobany  [address]  [place] [address]        To Whom It May Concern,      Callum was seen in clinic today for follow up of his diagnosis of adjustment disorder with anxious and depressed mood, which is the reason for his short term disability.  He has continued to be disabled since 4/19/22.  There was a delay in updating his disability form due to me being out of the office, therefore I would ask that he not be penalized with work attendance (points).          Sincerely,        Stephie Lewis, DNP, CNP
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
## 2022-11-15 ENCOUNTER — OFFICE VISIT (OUTPATIENT)
Dept: URGENT CARE | Facility: URGENT CARE | Age: 62
End: 2022-11-15
Payer: COMMERCIAL

## 2022-11-15 VITALS
DIASTOLIC BLOOD PRESSURE: 67 MMHG | SYSTOLIC BLOOD PRESSURE: 117 MMHG | TEMPERATURE: 97.7 F | OXYGEN SATURATION: 99 % | HEART RATE: 68 BPM

## 2022-11-15 DIAGNOSIS — R05.9 COUGH, UNSPECIFIED TYPE: ICD-10-CM

## 2022-11-15 DIAGNOSIS — R42 DIZZINESS: ICD-10-CM

## 2022-11-15 DIAGNOSIS — R50.9 HIGH FEVER: Primary | ICD-10-CM

## 2022-11-15 DIAGNOSIS — E11.9 TYPE 2 DIABETES MELLITUS WITHOUT COMPLICATION, WITHOUT LONG-TERM CURRENT USE OF INSULIN (H): ICD-10-CM

## 2022-11-15 DIAGNOSIS — R19.7 DIARRHEA, UNSPECIFIED TYPE: ICD-10-CM

## 2022-11-15 LAB
DEPRECATED S PYO AG THROAT QL EIA: NEGATIVE
FLUAV AG SPEC QL IA: NEGATIVE
FLUBV AG SPEC QL IA: NEGATIVE
GROUP A STREP BY PCR: NOT DETECTED
SARS-COV-2 RNA RESP QL NAA+PROBE: NEGATIVE

## 2022-11-15 PROCEDURE — U0003 INFECTIOUS AGENT DETECTION BY NUCLEIC ACID (DNA OR RNA); SEVERE ACUTE RESPIRATORY SYNDROME CORONAVIRUS 2 (SARS-COV-2) (CORONAVIRUS DISEASE [COVID-19]), AMPLIFIED PROBE TECHNIQUE, MAKING USE OF HIGH THROUGHPUT TECHNOLOGIES AS DESCRIBED BY CMS-2020-01-R: HCPCS | Performed by: FAMILY MEDICINE

## 2022-11-15 PROCEDURE — 99214 OFFICE O/P EST MOD 30 MIN: CPT | Mod: CS | Performed by: FAMILY MEDICINE

## 2022-11-15 PROCEDURE — 87804 INFLUENZA ASSAY W/OPTIC: CPT | Performed by: FAMILY MEDICINE

## 2022-11-15 PROCEDURE — U0005 INFEC AGEN DETEC AMPLI PROBE: HCPCS | Performed by: FAMILY MEDICINE

## 2022-11-15 PROCEDURE — 87651 STREP A DNA AMP PROBE: CPT | Performed by: FAMILY MEDICINE

## 2022-11-15 RX ORDER — BENZONATATE 100 MG/1
100 CAPSULE ORAL 3 TIMES DAILY PRN
Qty: 30 CAPSULE | Refills: 0 | Status: SHIPPED | OUTPATIENT
Start: 2022-11-15 | End: 2022-12-20

## 2022-11-15 NOTE — PROGRESS NOTES
Chief Complaint   Patient presents with     Urgent Care     Starting yesterday morning Pt has been having high fever, diarrhea, weak, dizzy and lightheaded, took Covid test//negative, trouble to hold food down, pt needs doctors note for job      Callum was seen today for urgent care.    Diagnoses and all orders for this visit:    High fever  -     Influenza A/B antigen  -     Symptomatic; Unknown COVID-19 Virus (Coronavirus) by PCR Nose  -     Streptococcus A Rapid Screen w/Reflex to PCR - Clinic Collect  -     Group A Streptococcus PCR Throat Swab    Diarrhea, unspecified type    Dizziness    Cough, unspecified type  -     benzonatate (TESSALON) 100 MG capsule; Take 1 capsule (100 mg) by mouth 3 times daily as needed    Type 2 diabetes mellitus without complication, without long-term current use of insulin (H)      Differential diagnosis gastroenteritis, H. pylori infection, influenza, COVID, strep infection, flu,        Results for orders placed or performed in visit on 11/15/22   Influenza A/B antigen     Status: Normal    Specimen: Nose; Swab   Result Value Ref Range    Influenza A antigen Negative Negative    Influenza B antigen Negative Negative    Narrative    Test results must be correlated with clinical data. If necessary, results should be confirmed by a molecular assay or viral culture.   Streptococcus A Rapid Screen w/Reflex to PCR - Clinic Collect     Status: Normal    Specimen: Throat; Swab   Result Value Ref Range    Group A Strep antigen Negative Negative       Time spent for reviewing  chart, medications ,treatment plan  with patient in details   Routine discharge counseling was given to the patient and the patient understands that worsening, changing or persistent symptoms should prompt an immediate call or follow up with their primary physician or the emergency department. The importance of appropriate follow up was also discussed with the patient.     Drink plenty of fluids. This helps prevent  too much fluid loss (dehydration). Water, clear soups, and electrolyte solutions are good choices. Don't take alcohol, coffee, tea, or milk. These can irritate your intestines and make symptoms worse.    Suck on ice chips if drinking makes you queasy.    Return to your normal diet slowly. You may want to eat bland foods at first, such as rice and toast. Also, you may need to stay away from certain foods for a while, such as dairy products. These can make symptoms worse. Ask your healthcare provider if there are any other foods you should stay away from.    If you were prescribed antibiotics, take them as directed.    Don't take anti-diarrhea medicines    Results for orders placed or performed in visit on 11/15/22   Influenza A/B antigen     Status: Normal    Specimen: Nose; Swab   Result Value Ref Range    Influenza A antigen Negative Negative    Influenza B antigen Negative Negative    Narrative    Test results must be correlated with clinical data. If necessary, results should be confirmed by a molecular assay or viral culture.   Streptococcus A Rapid Screen w/Reflex to PCR - Clinic Collect     Status: Normal    Specimen: Throat; Swab   Result Value Ref Range    Group A Strep antigen Negative Negative         10 point ROS of systems including  Eyes, , Cardiovascular,  Genitourinary, Integumentary, Muscularskeletal, Psychiatric were all negative except for pertinent positives noted in my HPI           SUBJECTIVE:   Callum Haywood is a 62 year old male with h/o dm who present complaining of flu-like symptoms: fevers, chills, myalgias, congestion, sore throat and cough for 2 days. Denies dyspnea or wheezing.has some coughing   Also has some diarrhea for a day He had few bm with no blood   Has no high fever appetite is good denies any nausea or throwing up.  But has been noticing some diarrhea denies any body ache.    OBJECTIVE:  Appears moderately ill but not toxic; temperature as noted in vitals. Ears normal.  Throat and pharynx normal.  Neck supple. No adenopathy in the neck. Sinuses non tender. The chest is clear.  The abdomen is soft without tenderness, guarding, mass, rebound or organomegaly. Bowel sounds are normal. No CVA tenderness or inguinal adenopathy noted.        Arin Lynn MD

## 2022-11-15 NOTE — LETTER
[facility] [place]  [address] [place] [address]  Phone: [phone]    11/15/22    Callum Haywood  [address]  [place] [address]           To whom it may concern:     Callum Haywood was seen in the clinic for current condition.  Needs to be off from work today and tomorrow can get back to work on 11/17/2022 only  If fever free for 24 hours without fever reducer and also if COVID-negative.    Sincerely,      Arin Lynn MD

## 2022-11-15 NOTE — LETTER
[facility] [place]  [address] [place] [address]  Phone: [phone]    11/15/22    Callum Haywood  [address]  [place] [address]      To whom it may concern:     Callum Haywood was seen in the clinic for current condition.  Needs to be off from work today can get back to work tomorrow only fever free for 24 hours without fever reducer and also if COVID-negative.    Sincerely,      Arin Lynn MD

## 2022-11-17 ENCOUNTER — TELEPHONE (OUTPATIENT)
Dept: FAMILY MEDICINE | Facility: CLINIC | Age: 62
End: 2022-11-17

## 2022-11-17 NOTE — TELEPHONE ENCOUNTER
Reason for Call:  Appointment Request    Patient requesting this type of appt: Chronic Diease Management/Medication/Follow-Up    Requested provider: Stephie Lewis    Reason patient unable to be scheduled: Not within requested timeframe    When does patient want to be seen/preferred time: 12/16-12/30    Comments: Patient called to schedule f/u appt. Writer inquired what the f/u was for but had difficulty understanding patient. Patient has 2 weeks of vacation during listed time and would like to be seen during that time frame. Please advise,    Could we send this information to you in [employer] or would you prefer to receive a phone call?:   Patient would prefer a phone call   Okay to leave a detailed message?: Yes at Cell number on file:    Telephone Information:   Mobile [phone]       Call taken on 11/17/2022 at 12:37 PM by Suzette Quiñonez

## 2022-11-18 NOTE — TELEPHONE ENCOUNTER
LVM that PCP is agreeable to squeeze him in during that time frame, advised to call back and ask for care team to be scheduled appropriately

## 2022-11-20 ENCOUNTER — HEALTH MAINTENANCE LETTER (OUTPATIENT)
Age: 62
End: 2022-11-20

## 2022-12-20 ENCOUNTER — OFFICE VISIT (OUTPATIENT)
Dept: FAMILY MEDICINE | Facility: CLINIC | Age: 62
End: 2022-12-20
Payer: COMMERCIAL

## 2022-12-20 VITALS
DIASTOLIC BLOOD PRESSURE: 79 MMHG | WEIGHT: 164 LBS | RESPIRATION RATE: 15 BRPM | OXYGEN SATURATION: 99 % | HEIGHT: 65 IN | TEMPERATURE: 97.7 F | SYSTOLIC BLOOD PRESSURE: 126 MMHG | HEART RATE: 54 BPM | BODY MASS INDEX: 27.32 KG/M2

## 2022-12-20 DIAGNOSIS — Z23 HIGH PRIORITY FOR 2019-NCOV VACCINE: ICD-10-CM

## 2022-12-20 DIAGNOSIS — Z12.11 SPECIAL SCREENING FOR MALIGNANT NEOPLASMS, COLON: ICD-10-CM

## 2022-12-20 DIAGNOSIS — E11.9 TYPE 2 DIABETES MELLITUS WITHOUT COMPLICATION, WITHOUT LONG-TERM CURRENT USE OF INSULIN (H): Primary | ICD-10-CM

## 2022-12-20 LAB
ANION GAP SERPL CALCULATED.3IONS-SCNC: 12 MMOL/L (ref 7–15)
BUN SERPL-MCNC: 11.3 MG/DL (ref 8–23)
CALCIUM SERPL-MCNC: 9.4 MG/DL (ref 8.8–10.2)
CHLORIDE SERPL-SCNC: 102 MMOL/L (ref 98–107)
CHOLEST SERPL-MCNC: 244 MG/DL
CREAT SERPL-MCNC: 0.79 MG/DL (ref 0.67–1.17)
DEPRECATED HCO3 PLAS-SCNC: 25 MMOL/L (ref 22–29)
GFR SERPL CREATININE-BSD FRML MDRD: >90 ML/MIN/1.73M2
GLUCOSE SERPL-MCNC: 118 MG/DL (ref 70–99)
HBA1C MFR BLD: 6.5 % (ref 0–5.6)
HDLC SERPL-MCNC: 58 MG/DL
LDLC SERPL CALC-MCNC: 158 MG/DL
NONHDLC SERPL-MCNC: 186 MG/DL
POTASSIUM SERPL-SCNC: 4.4 MMOL/L (ref 3.4–5.3)
SODIUM SERPL-SCNC: 139 MMOL/L (ref 136–145)
TRIGL SERPL-MCNC: 138 MG/DL

## 2022-12-20 PROCEDURE — 36415 COLL VENOUS BLD VENIPUNCTURE: CPT | Performed by: NURSE PRACTITIONER

## 2022-12-20 PROCEDURE — 82043 UR ALBUMIN QUANTITATIVE: CPT | Performed by: NURSE PRACTITIONER

## 2022-12-20 PROCEDURE — 99214 OFFICE O/P EST MOD 30 MIN: CPT | Performed by: NURSE PRACTITIONER

## 2022-12-20 PROCEDURE — 80048 BASIC METABOLIC PNL TOTAL CA: CPT | Performed by: NURSE PRACTITIONER

## 2022-12-20 PROCEDURE — 83036 HEMOGLOBIN GLYCOSYLATED A1C: CPT | Performed by: NURSE PRACTITIONER

## 2022-12-20 PROCEDURE — 82570 ASSAY OF URINE CREATININE: CPT | Performed by: NURSE PRACTITIONER

## 2022-12-20 PROCEDURE — 91312 COVID-19 VACCINE BIVALENT BOOSTER 12+ (PFIZER): CPT | Performed by: NURSE PRACTITIONER

## 2022-12-20 PROCEDURE — 80061 LIPID PANEL: CPT | Performed by: NURSE PRACTITIONER

## 2022-12-20 PROCEDURE — 0124A COVID-19 VACCINE BIVALENT BOOSTER 12+ (PFIZER): CPT | Performed by: NURSE PRACTITIONER

## 2022-12-20 ASSESSMENT — PAIN SCALES - GENERAL: PAINLEVEL: NO PAIN (0)

## 2022-12-20 NOTE — PROGRESS NOTES
"  Assessment & Plan     (E11.9) Type 2 diabetes mellitus without complication, without long-term current use of insulin (H)  (primary encounter diagnosis)  Comment: at goal  Plan: Hemoglobin A1c, Albumin Random Urine         Quantitative with Creat Ratio, Basic metabolic         panel  (Ca, Cl, CO2, Creat, Gluc, K, Na, BUN),         Lipid panel reflex to direct LDL Fasting        Continue with diet and exercise.  Follow up in 6 months.   He declines PCV20.     (Z23) High priority for 2019-nCoV vaccine  Comment:   Plan: COVID-19,PF,PFIZER BOOSTER BIVALENT 12+Yrs            (Z12.11) Special screening for malignant neoplasms, colon  Comment:   Plan: Colonoscopy Screening  Referral                31 minutes spent on the date of the encounter doing chart review, patient visit and documentation        BMI:   Estimated body mass index is 27.32 kg/m  as calculated from the following:    Height as of this encounter: 1.65 m (5' 4.96\").    Weight as of this encounter: 74.4 kg (164 lb).           No follow-ups on file.    Stephie Lewis NP  [facility]    Tata Nolen is a 62 year old accompanied by his son, presenting for the following health issues:  Follow Up (F/U from Last Visit) and Imm/Inj (COVID-19 VACCINE)      History of Present Illness       Reason for visit:  Check up    He eats 2-3 servings of fruits and vegetables daily.He consumes 1 sweetened beverage(s) daily.He exercises with enough effort to increase his heart rate 20 to 29 minutes per day.  He exercises with enough effort to increase his heart rate 4 days per week.   He is taking medications regularly.       He has been feeling well, is back to exercising regularly and eating healthy.  He is not checking blood sugars.    His chronic back pain is managed through daily stretches and exercises.            Review of Systems         Objective    /79 (BP Location: Right arm, Patient Position: Sitting, Cuff Size: " "Adult Large)   Pulse 54   Temp 97.7  F (36.5  C) (Temporal)   Resp 15   Ht 1.65 m (5' 4.96\")   Wt 74.4 kg (164 lb)   SpO2 99%   BMI 27.32 kg/m    Body mass index is 27.32 kg/m .  Physical Exam   GENERAL: healthy, alert and no distress  RESP: lungs clear to auscultation - no rales, rhonchi or wheezes  CV: regular rate and rhythm, normal S1 S2, no S3 or S4, no murmur, click or rub, no peripheral edema and peripheral pulses strong  PSYCH: mentation appears normal, affect normal/bright  Diabetic foot exam: normal DP and PT pulses, no trophic changes or ulcerative lesions and normal sensory exam                    "

## 2022-12-21 LAB
CREAT UR-MCNC: 247 MG/DL
MICROALBUMIN UR-MCNC: 32.1 MG/L
MICROALBUMIN/CREAT UR: 13 MG/G CR (ref 0–17)

## 2022-12-28 ENCOUNTER — TELEPHONE (OUTPATIENT)
Dept: GASTROENTEROLOGY | Facility: CLINIC | Age: 62
End: 2022-12-28

## 2022-12-28 NOTE — TELEPHONE ENCOUNTER
Screening Questions  BLUE  KIND OF PREP RED  LOCATION [review exclusion criteria] GREEN  SEDATION TYPE        y Are you active on mychart?       [employer] Ordering/Referring Provider?        BCBS What type of coverage do you have?      n Have you had a positive covid test in the last 14 days?     27.8 1. BMI  [BMI 40+ - review exclusion criteria]    y  2. Are you able to give consent for your medical care? [IF NO,RN REVIEW]        n  3. Are you taking any prescription pain medications on a routine schedule?      n  3a. EXTENDED PREP What kind of prescription?     n 4. Do you have any chemical dependencies such as alcohol, street drugs, or methadone?    n 5. Do you have any history of post-traumatic stress syndrome, severe anxiety or history of psychosis?      **If yes 3- 5 , please schedule with MAC sedation.**          IF YES TO ANY 6 - 10 - HOSPITAL SETTING ONLY.     n 6.   Do you need assistance transferring?     n 7.   Have you had a heart or lung transplant?    n 8.   Are you currently on dialysis?   n 9.   Do you use daily home oxygen?   n 10. Do you take nitroglycerin?   10a. n If yes, how often?     11. [FEMALES]  n Are you currently pregnant?    11a. n If yes, how many weeks? [ Greater than 12 weeks, OR NEEDED]    n 12. Do you have Pulmonary Hypertension? *NEED PAC APPT AT [facility]*     nn 13. [review exclusion criteria]  Do you have any implantable devices in your body (pacemaker, defib, LVAD)?    n 14. In the past 6 months, have you had any heart related issues including cardiomyopathy or heart attack?     14a. n If yes, did it require cardiac stenting if so when?     n 15. Have you had a stroke or Transient ischemic attack (TIA - aka  mini stroke ) within 6 months?      n 16. Do you have mod to severe Obstructive Sleep Apnea?  [Hospital only - Ok at [place]]    n 17. Do you have SEVERE AND UNCONTROLLED asthma? *NEED PAC APPT AT [facility]*     n 18. Are you currently taking any blood thinners?     18a. If  "yes, inform patient to \"follow up w/ ordering provider for bridging instructions.\"    n 19. Do you take the medication Phentermine?    19a. If yes, \"Hold for 7 days before procedure.  Please consult your prescribing provider if you have questions about holding this medication.\"     n  20. Do you have chronic kidney disease?      n  21. Do you have a diagnosis of diabetes?     n  22. On a regular basis do you go 3-5 days between bowel movements?      23. Preferred LOCAL Pharmacy for Pre Prescription    [ LIST ONLY ONE PHARMACY]          [id number] - [place], [place] - [address] AT [facility] OF [address]        - CLOSING REMINDERS -    Informed patient they will need an adult    Cannot take any type of public or medical transportation alone    Conscious Sedation- Needs  for 6 hours after the procedure       MAC/General-Needs  for 24 hours after procedure    Pre-Pro[facility] test [facility] be [facility]    Confirmed Nurse will call to complete assessment       - SCHEDULING DETAILS -  n Hospital Setting Required? If yes, what is the exclusion?:    César  Surgeon    3/17  Date of Procedure  Lower Endoscopy [Colonoscopy]  Type of Procedure Scheduled  [facility] [place] Location   [facility]-If you answer yes to questions #8, #20, #21Which Colonoscopy Prep was Sent?     CS Sedation Type     n PAC / Pre-op Required                 "

## 2023-03-06 ENCOUNTER — TELEPHONE (OUTPATIENT)
Dept: GASTROENTEROLOGY | Facility: CLINIC | Age: 63
End: 2023-03-06

## 2023-03-06 DIAGNOSIS — Z12.11 SPECIAL SCREENING FOR MALIGNANT NEOPLASMS, COLON: Primary | ICD-10-CM

## 2023-03-06 RX ORDER — BISACODYL 5 MG
TABLET, DELAYED RELEASE (ENTERIC COATED) ORAL
Qty: 4 TABLET | Refills: 0 | Status: SHIPPED | OUTPATIENT
Start: 2023-03-06 | End: 2023-11-01

## 2023-03-06 NOTE — TELEPHONE ENCOUNTER
Attempted to contact patient regarding upcoming Colonoscopy  procedure on 3/17/23 for pre assessment questions.     Was not a good time pt will return call to [phone] #4    Discuss Covid policy and designated  policy.    Pre op exam? N/A    Arrival time: 1230. Procedure time: 1330    [facility]; [address] [place] [address], 5[address] Floor, [place] [address]    Sedation type: Conscious sedation     Anticoagulants: No    Electronic implanted devices? No    Diabetic? Yes - Patient to hold oral diabetic medications day of procedure    Indication for procedure: Pt doesn't take any medications    Bowel prep recommendation: Standard Golytely      Prep instructions sent via [employer]. Bowel prep script sent to      [facility] #[id number] - [place], [place] - [address] AT [facility] OF LAURITA Grady RN  Endoscopy Procedure Pre Assessment RN

## 2023-03-06 NOTE — PROGRESS NOTES
"Chief Complaint   Patient presents with     Urgent Care     Cough     Since last night congestion, cry cough, congestion, running nose unable to sleep.     SUBJECTIVE:  Callum Haywood is a 61 year old male who presents to the clinic today with cough, congestion, runny nose, post nasal drip, watery, itchy eyes for 1 day. He could hardly sleep last night and needs a work note. Had covid mid January and fully vaccinated.    Past Medical History:   Diagnosis Date     DDD (degenerative disc disease), lumbar      Esophageal reflux      Helicobacter pylori (H. pylori)      Irritable bowel syndrome      Low back pain      Other specified psychosexual disorder      Pure hypercholesterolemia      cholecalciferol (VITAMIN D) 1000 UNIT tablet, Take 1,000 Units by mouth daily Vitamin d 3    No current facility-administered medications on file prior to visit.    Social History     Tobacco Use     Smoking status: Never Smoker     Smokeless tobacco: Never Used   Substance Use Topics     Alcohol use: Yes     Comment: 3 times a month      Allergies   Allergen Reactions     No Known Allergies      Seasonal Allergies      Review of Systems   All systems negative except for those listed above in  HPI.    EXAM:   /72   Pulse 86   Temp 97.5  F (36.4  C) (Temporal)   Resp 22   Ht 1.651 m (5' 5\")   Wt 78.9 kg (174 lb)   SpO2 97%   BMI 28.96 kg/m      Physical Exam  Vitals reviewed.   Constitutional:       General: He is not in acute distress.     Appearance: Normal appearance. He is not ill-appearing, toxic-appearing or diaphoretic.   HENT:      Head: Normocephalic and atraumatic.      Right Ear: Tympanic membrane normal.      Left Ear: Tympanic membrane and ear canal normal.      Nose: Congestion and rhinorrhea present.      Mouth/Throat:      Mouth: Mucous membranes are moist.      Pharynx: Oropharynx is clear. No oropharyngeal exudate or posterior oropharyngeal erythema.   Eyes:      General:         Right eye: No " discharge.         Left eye: No discharge.      Extraocular Movements: Extraocular movements intact.      Conjunctiva/sclera: Conjunctivae normal.      Pupils: Pupils are equal, round, and reactive to light.   Cardiovascular:      Rate and Rhythm: Normal rate.      Pulses: Normal pulses.   Pulmonary:      Effort: Respiratory distress (dry cough) present.      Breath sounds: Normal breath sounds. No stridor. No wheezing, rhonchi or rales.   Chest:      Chest wall: No tenderness.   Musculoskeletal:         General: Normal range of motion.      Cervical back: Normal range of motion and neck supple.   Lymphadenopathy:      Cervical: No cervical adenopathy.   Skin:     General: Skin is warm and dry.      Findings: No rash.   Neurological:      General: No focal deficit present.      Mental Status: He is alert and oriented to person, place, and time.   Psychiatric:         Mood and Affect: Mood normal.         Behavior: Behavior normal.       ASSESSMENT:    ICD-10-CM    1. Rhinorrhea  J34.89    2. Dry cough  R05.8      PLAN:    Likely seasonal allergies versus low grade virus, should run its course  Shared decision making to hold on covid as he had it within 3 months  Zyrtec in the morning, benadryl at night  Flonase in the nose, nasal saline  Zatidor or pataday over the counter eye drops for itchy watery eyes  Rest!  Drink plenty of fluids (warm fluids like tea or soup are soothing and reduce cough)  Sit in the bathroom with a hot shower running and breathe in the steam.  Honey may soothe your sore throat and help manage your cough- may take straight or in warm water with lemon juice.  Avoid smoke (cigarettes, bonfires, fireplace, wood burning stoves).  Take Tylenol or an NSAID such as ibuprofen or naproxen as needed for pain.  Good handwashing is the best way to prevent spread of germs  Present to emergency room if you develop trouble breathing, swallowing or cough-up blood.  Follow up with your primary care provider if  symptoms worsen or fail to improve as expected.    Follow up with primary care provider with any problems, questions or concerns or if symptoms worsen or fail to improve. Patient agreed to plan and verbalized understanding.    CLEMENT Long-BC  [facility]   [Follow-Up Visit] : a follow-up visit for [Other: ____] : [unfilled]

## 2023-03-09 ENCOUNTER — TELEPHONE (OUTPATIENT)
Dept: GASTROENTEROLOGY | Facility: CLINIC | Age: 63
End: 2023-03-09
Payer: COMMERCIAL

## 2023-03-09 ENCOUNTER — TELEPHONE (OUTPATIENT)
Dept: NURSING | Facility: CLINIC | Age: 63
End: 2023-03-09
Payer: COMMERCIAL

## 2023-03-09 ENCOUNTER — NURSE TRIAGE (OUTPATIENT)
Dept: NURSING | Facility: CLINIC | Age: 63
End: 2023-03-09
Payer: COMMERCIAL

## 2023-03-09 NOTE — TELEPHONE ENCOUNTER
Son Rudy was given verbal consent by pt.    Pt has colonoscopy on 3/17. Would like to reschedule colonoscopy at a later time, pt has back issues now. (see triage note 3/9/23)    Kindly call Rudy [phone] for scheduling options      Jenna Martinez RN/[place] Nurse Advisor

## 2023-03-09 NOTE — TELEPHONE ENCOUNTER
Caller: Writer to Anil  Reason for Reschedule/Cancellation (please be detailed, any staff messages or encounters to note?): Pt request  Son Rudy was given verbal consent by pt.     Pt has colonoscopy on 3/17. Would like to reschedule colonoscopy at a later time, pt has back issues now. (see triage note 3/9/23)     Kindly call Rudy [phone] for scheduling options     Jenna Martinez RN/[place] Nurse Advisor    Prior to reschedule please review:    Ordering Provider:BRENDA SHELLEY    Sedation per order:Moderate    Does patient have any ASC Exclusions, please identify?: No      Notes on Cancelled Procedure:    Procedure:Lower Endoscopy [Colonoscopy]     Date: 3/17/2023    [facility]; [address] [place] [address], 5th Floor, [place] [id number]    Surgeon: Layne        Rescheduled: No , Anil will call back tomorrow reschedule.    CASE IN DEPOT

## 2023-03-09 NOTE — TELEPHONE ENCOUNTER
Patient's son calling who originally stated that he was the patient. Patient is not with him and there is not a consent on file to speak with him. He will call back when his dad gets home this afternoon. No triage.     VANCE MURPHY RN

## 2023-03-09 NOTE — TELEPHONE ENCOUNTER
Nurse Triage SBAR    Is this a 2nd Level Triage? YES, LICENSED PRACTITIONER REVIEW IS REQUIRED    Situation: Low back pain, back injury     Son Rudy parmar, given verbal consent. Initially calling about colonoscopy. FNA will create a separate TE for gastro.    Background:   Pt has recurrent back pain for 3 years, on and off.   Pt also had back injury 1.5 weeks ago (around 2/26), slipped on ice, went under his car and landed on his back. Pt did not hit his head or lose consciousness    Assessment:   Low back pain rated 5/10. Pt states that low back pain worsened around 3/1-3/2, which is 4 days after the fall. Pt states he noticed more pain starting yesterday.  Pt can still walk, no limping, but appears to have a slight change in gait.   Back pain when he coughs  Hard to bend, sometimes loses his balance when bending due to pain    Pt continues to go to work, pt stopped going to the gym yesterday and today due to pain.   Treating pain at home with topical Tiger Balm, alternating ice and warm packs.    No leg weakness, no open wound, lump and bruising have resolved.    Protocol Recommended Disposition:   See in Office Today or Tomorrow    Recommendation:   Be seen within 24 hours. Transferred to .  - advised okay to take Tylenol PRN for pain, take 1000 mg TID PRN  - avoid movements/position which would trigger more pain  - if pain becomes severe, head in to UC/ED    Caller verbalized understanding.        Jenna Martinez RN/[place] Nurse Advisor          Reason for Disposition    High-risk adult (e.g., age > 60 years, osteoporosis, chronic steroid use)    Additional Information    Negative: Dangerous mechanism of injury (e.g., MVA, contact sports, trampoline, diving, fall > 10 feet or 3 meters)  (Exception: Back pain began > 1 hour after injury.)    Negative: Weakness (i.e., paralysis, loss of muscle strength) of the leg(s) or foot and sudden onset after back injury    Negative: Numbness (i.e., loss of  sensation) of the leg(s) or foot and sudden onset after back injury    Negative: Major bleeding (actively dripping or spurting) that can't be stopped    Negative: Bullet, knife or other serious penetrating wound    Negative: Shock suspected (e.g., cold/pale/clammy skin, too weak to stand, low BP, rapid pulse)    Negative: Sounds like a life-threatening emergency to the triager    Negative: Back pain not from an injury    Negative: Back pain from overuse (work, exercise, gardening) OR from twisting, lifting, or bending injury    Negative: SEVERE pain in kidney area (flank) that follows a direct blow to that site    Negative: Blood in urine (red, pink, or tea-colored)    Negative: Unable to urinate (or only a few drops) > 4 hours and bladder feels very full (e.g., palpable bladder or strong urge to urinate)    Negative: Loss of bladder or bowel control (urine or bowel incontinence; wetting self, leaking stool) of new-onset    Negative: Numbness (loss of sensation) in groin or rectal area    Negative: Skin is split open or gaping (length > 1/2 inch or 12 mm)    Negative: Puncture wound of back    Negative: Bleeding won't stop after 10 minutes of direct pressure (using correct technique)    Negative: Sounds like a serious injury to the triager    Negative: Weakness of a leg or foot (e.g., unable to bear weight, dragging foot)    Negative: Numbness of a leg or foot (i.e., loss of sensation)    Negative: SEVERE back pain (e.g., excruciating, unable to do any normal activities) and not improved after pain medicine and CARE ADVICE    Negative: Pain radiates into the thigh or further down the leg now    Negative: Landed hard on feet or buttocks and pain over spine    Negative: Large swelling or bruise and size > palm of person's hand    Negative: No prior tetanus shots (or is not fully vaccinated) and any wound (e.g., cut or scrape)    Negative: HIV positive or severe immunodeficiency (severely weak immune system) and DIRTY  cut or scrape    Negative: Patient is confused or is an unreliable provider of information (e.g., dementia, severe intellectual disability, alcohol intoxication)    Negative: Patient wants to be seen    Negative: Last tetanus shot > 5 years ago and DIRTY cut or scrape    Negative: Last tetanus shot > 10 years ago and CLEAN cut or scrape    Protocols used: BACK INJURY-A-OH

## 2023-03-10 ENCOUNTER — OFFICE VISIT (OUTPATIENT)
Dept: FAMILY MEDICINE | Facility: CLINIC | Age: 63
End: 2023-03-10
Payer: COMMERCIAL

## 2023-03-10 ENCOUNTER — ANCILLARY PROCEDURE (OUTPATIENT)
Dept: GENERAL RADIOLOGY | Facility: CLINIC | Age: 63
End: 2023-03-10
Attending: NURSE PRACTITIONER
Payer: COMMERCIAL

## 2023-03-10 VITALS
HEIGHT: 65 IN | WEIGHT: 165 LBS | TEMPERATURE: 97.7 F | SYSTOLIC BLOOD PRESSURE: 108 MMHG | RESPIRATION RATE: 19 BRPM | OXYGEN SATURATION: 99 % | BODY MASS INDEX: 27.49 KG/M2 | DIASTOLIC BLOOD PRESSURE: 60 MMHG | HEART RATE: 54 BPM

## 2023-03-10 DIAGNOSIS — M54.50 ACUTE LEFT-SIDED LOW BACK PAIN WITHOUT SCIATICA: ICD-10-CM

## 2023-03-10 DIAGNOSIS — M54.50 ACUTE LEFT-SIDED LOW BACK PAIN WITHOUT SCIATICA: Primary | ICD-10-CM

## 2023-03-10 PROCEDURE — 99214 OFFICE O/P EST MOD 30 MIN: CPT | Performed by: NURSE PRACTITIONER

## 2023-03-10 PROCEDURE — 72100 X-RAY EXAM L-S SPINE 2/3 VWS: CPT | Mod: TC | Performed by: RADIOLOGY

## 2023-03-10 ASSESSMENT — PAIN SCALES - GENERAL: PAINLEVEL: SEVERE PAIN (6)

## 2023-03-10 NOTE — PROGRESS NOTES
"  Assessment & Plan     Acute left-sided low back pain without sciatica  Lumbar xrays completed today and radiology report is normal.  Reviewed treatment of pain including alternating Tylenol and Ibuprofen.  May continue tiger balm and ice/heat.  Recommend avoiding any lifting at the gym.  Discussed PT referral if symptoms not improving.  Patient and son agree with plan of care.   - XR Lumbar Spine 2/3 Views         BMI:   Estimated body mass index is 27.88 kg/m  as calculated from the following:    Height as of this encounter: 1.638 m (5' 4.5\").    Weight as of this encounter: 74.8 kg (165 lb).       Return if symptoms worsen or fail to improve.    Lynn Haque NP  [facility] [place] [facility] AYDE Nolen is a 62 year old accompanied by his son who presents with low back pain.  He has a history of chronic low back pain x 3 years.  He has done PT and has a stretching regime for this.  Also had an injection at one point, but that was not helpful.    Patient slipped on ice about 2 weeks ago.  Pain started about 3-4 days after fall.  Pain is primarily to his left low back and radiates to his left buttocks area.  Worse with bending and coughing.  Sleep is fine.  No radicular symptoms down his leg, paresthesias, or episodes of incontinence.  He likes to lift weight at the gym, but this is painful.  Using Tiger balm, heat/ice, and Tylenol for pain.     History of Present Illness       Back Pain:  He presents for follow up of back pain. Patient's back pain is a recurring problem.  Location of back pain:  Left lower back and left buttock  Description of back pain: dull ache and fullness  Back pain spreads: left buttocks    Since patient first noticed back pain, pain is: gradually worsening  Does back pain interfere with his job:  Yes      He eats 0-1 servings of fruits and vegetables daily.He consumes 0 sweetened beverage(s) daily.He exercises with enough effort to increase his heart rate 10 to 19 " "minutes per day.  He exercises with enough effort to increase his heart rate 4 days per week.   He is taking medications regularly.       Review of Systems   Pertinent items in HPI        Objective    /60   Pulse 54   Temp 97.7  F (36.5  C) (Temporal)   Resp 19   Ht 1.638 m (5' 4.5\")   Wt 74.8 kg (165 lb)   SpO2 99%   BMI 27.88 kg/m    Body mass index is 27.88 kg/m .  Physical Exam   GENERAL: healthy, alert and no distress  Comprehensive back pain exam:  Tenderness of left paraspinal muscles and upper left buttocks, Range of motion not limited by pain, Lower extremity strength functional and equal on both sides, Lower extremity reflexes within normal limits bilaterally, Lower extremity sensation normal and equal on both sides and Straight leg raise negative bilaterally          "

## 2023-03-15 ENCOUNTER — TELEPHONE (OUTPATIENT)
Dept: GASTROENTEROLOGY | Facility: CLINIC | Age: 63
End: 2023-03-15
Payer: COMMERCIAL

## 2023-03-15 NOTE — TELEPHONE ENCOUNTER
Caller: Callum  Reason for Reschedule/Cancellation (please be detailed, any staff messages or encounters to note?): Patient had fall and back issues      Prior to reschedule please review:    Ordering Provider:BRENDA SHELLEY    Sedation per order:Moderate    Does patient have any ASC Exclusions, please identify?: No        Notes on Cancelled Procedure:    Procedure:Lower Endoscopy [Colonoscopy]     Date: 3/17/2023    Location:[facility]; [address] [place] [address], [address] [place] [address]    Surgeon: Layne       Rescheduled: Yes    Procedure: Lower Endoscopy [Colonoscopy]     Date: 5/12/23    Location:[facility]; [address] [place] [address], [address] [place] [address]    Surgeon: Andrés        Sedation Level Scheduled  CS,  Reason for Sedation Level order    Prep/Instructions updated and sent:                   '

## 2023-04-15 ENCOUNTER — HEALTH MAINTENANCE LETTER (OUTPATIENT)
Age: 63
End: 2023-04-15

## 2023-04-26 NOTE — TELEPHONE ENCOUNTER
Rescheduled Colonoscopy    Attempted to contact patient regarding upcoming Colonoscopy  procedure on 5/12/23 for pre assessment questions. No answer.     Left message to return call to [phone] #4    Discuss Covid policy and designated  policy.    Pre op exam? N/A    Arrival time: 1315. Procedure time: 1415    [facility]; [address] [place] [address], 5[address] Floor, [place] [address]    Sedation type: Conscious sedation     Anticoagulants: No    Electronic implanted devices? No    Diabetic? Yes - Patient to hold oral diabetic medications day of procedure - no oral meds    Indication for procedure: screening colonoscopy    Bowel prep recommendation: Standard Golytely      Prep instructions sent via [employer]. Bowel prep script sent to      [facility] #[id number] - [place], [place] - [address] AT [facility] OF LAURITA Grady RN  Endoscopy Procedure Pre Assessment RN

## 2023-05-05 NOTE — TELEPHONE ENCOUNTER
Second attempt for pre-assessment prior to upcoming colonoscopy on 5.12.23     No answer.  Left message to return call [phone] #4    [employer] message sent.    Yanira Aranda RN  Endoscopy Procedure Pre Assessment RN

## 2023-05-05 NOTE — TELEPHONE ENCOUNTER
Pre-assessment RN spent 24 minutes with patient and their son going over pre-assessment questions and how to take their bowel prep.    Pre assessment questions completed for upcoming Colonoscopy  procedure scheduled on 5.12.2023    COVID policy reviewed.     Reviewed procedural arrival time 1315 and facility location [facility]; [address] [place] [address], [address] Floor, [place] [address]    Designated  policy reviewed. Instructed to have someone stay 6 hours post procedure.     NSAIDs? No    Anticoagulation/blood thinners? No    Electronic implanted devices? No    Diabetic? Yes - Patient to hold oral diabetic medications day of procedure    Reviewed procedure prep instructions.     Patient verbalized understanding and had no questions or concerns at this time.    Danielle Gallego RN  Endoscopy Procedure Pre Assessment RN

## 2023-05-09 RX ORDER — BISACODYL 5 MG/1
TABLET, DELAYED RELEASE ORAL
Qty: 4 TABLET | Refills: 0 | Status: SHIPPED | OUTPATIENT
Start: 2023-05-09 | End: 2023-11-01

## 2023-05-09 NOTE — TELEPHONE ENCOUNTER
Pt son called stating that the medication is not at the pharmacy - This writer sent over the bowel prep to     Bowel prep has been sent to      [id number] - [place], [place] - [address] AT [facility] OF [address]    Pt son had no further questions or concerns    Stephie Grady RN on 5/9/2023 at 10:27 AM

## 2023-05-12 ENCOUNTER — ANESTHESIA EVENT (OUTPATIENT)
Dept: SURGERY | Facility: AMBULATORY SURGERY CENTER | Age: 63
End: 2023-05-12
Payer: COMMERCIAL

## 2023-05-12 ENCOUNTER — HOSPITAL ENCOUNTER (OUTPATIENT)
Facility: AMBULATORY SURGERY CENTER | Age: 63
Discharge: HOME | End: 2023-05-12
Attending: SURGERY | Admitting: SURGERY
Payer: COMMERCIAL

## 2023-05-12 ENCOUNTER — ANESTHESIA (OUTPATIENT)
Dept: SURGERY | Facility: AMBULATORY SURGERY CENTER | Age: 63
End: 2023-05-12
Payer: COMMERCIAL

## 2023-05-12 VITALS
RESPIRATION RATE: 16 BRPM | SYSTOLIC BLOOD PRESSURE: 111 MMHG | WEIGHT: 167 LBS | HEART RATE: 50 BPM | BODY MASS INDEX: 27.82 KG/M2 | OXYGEN SATURATION: 98 % | DIASTOLIC BLOOD PRESSURE: 71 MMHG | HEIGHT: 65 IN | TEMPERATURE: 97.5 F

## 2023-05-12 VITALS — HEART RATE: 59 BPM

## 2023-05-12 LAB — COLONOSCOPY: NORMAL

## 2023-05-12 PROCEDURE — 88313 SPECIAL STAINS GROUP 2: CPT | Mod: 26 | Performed by: PATHOLOGY

## 2023-05-12 PROCEDURE — 88342 IMHCHEM/IMCYTCHM 1ST ANTB: CPT | Mod: 26 | Performed by: PATHOLOGY

## 2023-05-12 PROCEDURE — 88341 IMHCHEM/IMCYTCHM EA ADD ANTB: CPT | Mod: 26 | Performed by: PATHOLOGY

## 2023-05-12 PROCEDURE — 88305 TISSUE EXAM BY PATHOLOGIST: CPT | Mod: 26 | Performed by: PATHOLOGY

## 2023-05-12 PROCEDURE — 45385 COLONOSCOPY W/LESION REMOVAL: CPT | Mod: 33

## 2023-05-12 PROCEDURE — 88305 TISSUE EXAM BY PATHOLOGIST: CPT | Mod: TC | Performed by: SURGERY

## 2023-05-12 RX ORDER — SODIUM CHLORIDE, SODIUM LACTATE, POTASSIUM CHLORIDE, CALCIUM CHLORIDE 600; 310; 30; 20 MG/100ML; MG/100ML; MG/100ML; MG/100ML
INJECTION, SOLUTION INTRAVENOUS CONTINUOUS PRN
Status: DISCONTINUED | OUTPATIENT
Start: 2023-05-12 | End: 2023-05-12

## 2023-05-12 RX ORDER — PROPOFOL 10 MG/ML
INJECTION, EMULSION INTRAVENOUS CONTINUOUS PRN
Status: DISCONTINUED | OUTPATIENT
Start: 2023-05-12 | End: 2023-05-12

## 2023-05-12 RX ORDER — LIDOCAINE HYDROCHLORIDE 20 MG/ML
INJECTION, SOLUTION INFILTRATION; PERINEURAL PRN
Status: DISCONTINUED | OUTPATIENT
Start: 2023-05-12 | End: 2023-05-12

## 2023-05-12 RX ORDER — PROPOFOL 10 MG/ML
INJECTION, EMULSION INTRAVENOUS PRN
Status: DISCONTINUED | OUTPATIENT
Start: 2023-05-12 | End: 2023-05-12

## 2023-05-12 RX ORDER — ONDANSETRON 2 MG/ML
4 INJECTION INTRAMUSCULAR; INTRAVENOUS ONCE
Status: DISCONTINUED | OUTPATIENT
Start: 2023-05-12 | End: 2023-05-13 | Stop reason: HOSPADM

## 2023-05-12 RX ORDER — ACETAMINOPHEN 325 MG/1
975 TABLET ORAL ONCE
Status: DISCONTINUED | OUTPATIENT
Start: 2023-05-12 | End: 2023-05-13 | Stop reason: HOSPADM

## 2023-05-12 RX ADMIN — SODIUM CHLORIDE, SODIUM LACTATE, POTASSIUM CHLORIDE, CALCIUM CHLORIDE: 600; 310; 30; 20 INJECTION, SOLUTION INTRAVENOUS at 14:03

## 2023-05-12 RX ADMIN — PROPOFOL 30 MG: 10 INJECTION, EMULSION INTRAVENOUS at 14:07

## 2023-05-12 RX ADMIN — LIDOCAINE HYDROCHLORIDE 80 MG: 20 INJECTION, SOLUTION INFILTRATION; PERINEURAL at 14:04

## 2023-05-12 RX ADMIN — PROPOFOL 200 MCG/KG/MIN: 10 INJECTION, EMULSION INTRAVENOUS at 14:06

## 2023-05-12 RX ADMIN — PROPOFOL 20 MG: 10 INJECTION, EMULSION INTRAVENOUS at 14:09

## 2023-05-12 NOTE — ANESTHESIA CARE TRANSFER NOTE
Patient: Callum Haywood    Procedure: Procedure(s):  COLONOSCOPY, WITH POLYPECTOMY       Diagnosis: Special screening for malignant neoplasms, colon [Z12.11]  Diagnosis Additional Information: No value filed.    Anesthesia Type:   No value filed.     Note:    Oropharynx: spontaneously breathing  Level of Consciousness: awake  Oxygen Supplementation: room air    Independent Airway: airway patency satisfactory and stable  Dentition: dentition unchanged  Vital Signs Stable: post-procedure vital signs reviewed and stable  Report to RN Given: handoff report given  Patient transferred to: Phase II    Handoff Report: Identifed the Patient, Identified the Reponsible Provider, Reviewed the pertinent medical history, Discussed the surgical course, Reviewed Intra-OP anesthesia mangement and issues during anesthesia, Set expectations for post-procedure period and Allowed opportunity for questions and acknowledgement of understanding      Vitals:  Vitals Value Taken Time   /68    Temp 97.2    Pulse 63    Resp     SpO2 96        Electronically Signed By: CHESTER Edmondson CRNA  May 12, 2023  2:46 PM

## 2023-05-12 NOTE — H&P
Callum Haywood  [id number]  male  62 year old      Reason for procedure/surgery: Screening    Patient Active Problem List   Diagnosis     GERD     IBS     PSYCHOSEXUAL DISORDER     HYPERLIPIDEMIA LDL GOAL <160     Posttraumatic stress disorder     Health Care Home     Chronic bilateral low back pain with bilateral sciatica     Diabetes mellitus, type 2 (H)       Past Surgical History:    Past Surgical History:   Procedure Laterality Date     NO HISTORY OF SURGERY         Past Medical History:   Past Medical History:   Diagnosis Date     DDD (degenerative disc disease), lumbar      Esophageal reflux      Helicobacter pylori (H. pylori)      Irritable bowel syndrome      Low back pain      Other specified psychosexual disorder      Pure hypercholesterolemia        Social History:   Social History     Tobacco Use     Smoking status: Never     Smokeless tobacco: Never   Vaping Use     Vaping status: Not on file   Substance Use Topics     Alcohol use: Yes     Comment: 3 times a month        Family History:   Family History   Problem Relation Age of Onset     C.A.D. No family hx of      Diabetes No family hx of      Hypertension No family hx of      Cerebrovascular Disease No family hx of      Breast Cancer No family hx of      Cancer - colorectal No family hx of      Prostate Cancer No family hx of      Alcohol/Drug No family hx of      Allergies No family hx of      Alzheimer Disease No family hx of        Allergies:   Allergies   Allergen Reactions     No Known Allergies      Seasonal Allergies        Active Medications:   Current Outpatient Medications   Medication Sig Dispense Refill     bisacodyl (DULCOLAX) 5 MG EC tablet Take 2 tablets at 3 pm the day before your procedure. If your procedure is before 11 am, take 2 additional tablets at 11 pm. If your procedure is after 11 am, take 2 additional tablets at 6 am. For additional instructions refer to your colonoscopy prep instructions. 4 tablet 0     bisacodyl  "(DULCOLAX) 5 MG EC tablet Take 2 tablets at 3 pm the day before your procedure. If your procedure is before 11 am, take 2 additional tablets at 11 pm. If your procedure is after 11 am, take 2 additional tablets at 6 am. For additional instructions refer to your colonoscopy prep instructions. 4 tablet 0     cholecalciferol (VITAMIN D) 1000 UNIT tablet Take 1,000 Units by mouth daily Vitamin d 3 100 tablet 3     polyethylene glycol (GOLYTELY) 236 g suspension The night before the exam at 6 pm drink an 8-ounce glass every 15 minutes until the jug is half empty. If you arrive before 11 AM: Drink the other half of the Golytely jug at 11 PM night before procedure. If you arrive after 11 AM: Drink the other half of the Golytely jug at 6 AM day of procedure. For additional instructions refer to your colonoscopy prep instructions. 4000 mL 0       Systemic Review:   CONSTITUTIONAL: NEGATIVE for fever, chills, change in weight  ENT/MOUTH: NEGATIVE for ear, mouth and throat problems  RESP: NEGATIVE for significant cough or SOB  CV: NEGATIVE for chest pain, palpitations or peripheral edema    Physical Examination:   Vital Signs: /73 (BP Location: Right arm)   Pulse 57   Temp 98.4  F (36.9  C) (Temporal)   Resp 18   Ht 1.651 m (5' 5\")   Wt 75.8 kg (167 lb)   SpO2 99%   BMI 27.79 kg/m    GENERAL: healthy, alert and no distress  NECK: no adenopathy, no asymmetry, masses, or scars  RESP: lungs clear to auscultation - no rales, rhonchi or wheezes  CV: regular rate and rhythm, normal S1 S2, no S3 or S4, no murmur, click or rub, no peripheral edema and peripheral pulses strong  ABDOMEN: soft, nontender, no hepatosplenomegaly, no masses and bowel sounds normal  MS: no gross musculoskeletal defects noted, no edema    Plan: Appropriate to proceed as scheduled.      Mando Bowen MD    Division of Colon & Rectal Surgery  Department of Surgery  [facility]" [place]  [phone]    5/12/2023    PCP:  Stephie Lewis

## 2023-05-12 NOTE — ANESTHESIA PREPROCEDURE EVALUATION
Anesthesia Pre-Procedure Evaluation    Patient: Callum Haywood   MRN: [id number] : 1960        Procedure : Procedure(s):  COLONOSCOPY          Past Medical History:   Diagnosis Date     DDD (degenerative disc disease), lumbar      Esophageal reflux      Helicobacter pylori (H. pylori)      Irritable bowel syndrome      Low back pain      Other specified psychosexual disorder      Pure hypercholesterolemia       Past Surgical History:   Procedure Laterality Date     NO HISTORY OF SURGERY        Allergies   Allergen Reactions     No Known Allergies      Seasonal Allergies       Social History     Tobacco Use     Smoking status: Never     Smokeless tobacco: Never   Vaping Use     Vaping status: Not on file   Substance Use Topics     Alcohol use: Yes     Comment: 3 times a month       Wt Readings from Last 1 Encounters:   23 75.8 kg (167 lb)        Anesthesia Evaluation            ROS/MED HX  ENT/Pulmonary:       Neurologic:       Cardiovascular:       METS/Exercise Tolerance:     Hematologic:       Musculoskeletal:       GI/Hepatic:     (+) GERD,     Renal/Genitourinary:       Endo:     (+) type II DM, Not using insulin,     Psychiatric/Substance Use:       Infectious Disease:       Malignancy:       Other:               OUTSIDE LABS:  CBC:   Lab Results   Component Value Date    WBC 3.9 (L) 2022    WBC 5.5 2020    HGB 14.1 2022    HGB 15.3 2020    HCT 42.0 2022    HCT 44.4 2020     2022     2020     BMP:   Lab Results   Component Value Date     2022     2022    POTASSIUM 4.4 2022    POTASSIUM 4.0 2022    CHLORIDE 102 2022    CHLORIDE 106 2022    CO2 25 2022    CO2 25 2022    BUN 11.3 2022    BUN 15 2022    CR 0.79 2022    CR 0.74 2022     (H) 2022     (H) 2022     (H) 2022     COAGS: No results found for: PTT, INR,  FIBR  POC: No results found for: BGM, HCG, HCGS  HEPATIC:   Lab Results   Component Value Date    ALBUMIN 3.3 (L) 02/28/2022    PROTTOTAL 6.9 02/28/2022    ALT 30 02/28/2022    AST 17 02/28/2022    ALKPHOS 67 02/28/2022    BILITOTAL 0.3 02/28/2022     OTHER:   Lab Results   Component Value Date    A1C 6.5 (H) 12/20/2022    MARCIN 9.4 12/20/2022    SED 36 (H) 03/10/2020       Anesthesia Plan    ASA Status:  2      Anesthesia Type: MAC.     - Reason for MAC: immobility needed, straight local not clinically adequate              Consents    Anesthesia Plan(s) and associated risks, benefits, and realistic alternatives discussed. Questions answered and patient/representative(s) expressed understanding.     - Discussed: Risks, Benefits and Alternatives for BOTH SEDATION and the PROCEDURE were discussed     - Discussed with:  Patient      - Extended Intubation/Ventilatory Support Discussed: No.      - Patient is DNR/DNI Status: No    Use of blood products discussed: No .     Postoperative Care    Pain management: IV analgesics, Oral pain medications.   PONV prophylaxis: Ondansetron (or other 5HT-3), Dexamethasone or Solumedrol     Comments:                Dangelo Abel MD, MD

## 2023-05-12 NOTE — ANESTHESIA POSTPROCEDURE EVALUATION
Patient: Callum Haywood    Procedure: Procedure(s):  COLONOSCOPY, WITH POLYPECTOMY       Anesthesia Type:  No value filed.    Note:  Disposition: Outpatient   Postop Pain Control: Uneventful            Sign Out: Well controlled pain   PONV: No   Neuro/Psych: Uneventful            Sign Out: Acceptable/Baseline neuro status   Airway/Respiratory: Uneventful            Sign Out: Acceptable/Baseline resp. status   CV/Hemodynamics: Uneventful            Sign Out: Acceptable CV status; No obvious hypovolemia; No obvious fluid overload   Other NRE: NONE   DID A NON-ROUTINE EVENT OCCUR? No           Last vitals:  Vitals Value Taken Time   /71 05/12/23 1451   Temp 36.4  C (97.5  F) 05/12/23 1451   Pulse 50 05/12/23 1451   Resp 16 05/12/23 1451   SpO2 98 % 05/12/23 1451       Electronically Signed By: Dangelo Abel MD, MD  May 12, 2023  3:05 PM

## 2023-05-17 LAB
PATH REPORT.COMMENTS IMP SPEC: NORMAL
PATH REPORT.FINAL DX SPEC: NORMAL
PATH REPORT.GROSS SPEC: NORMAL
PATH REPORT.MICROSCOPIC SPEC OTHER STN: NORMAL
PATH REPORT.RELEVANT HX SPEC: NORMAL
PHOTO IMAGE: NORMAL

## 2023-09-16 ENCOUNTER — HEALTH MAINTENANCE LETTER (OUTPATIENT)
Age: 63
End: 2023-09-16

## 2023-11-01 ENCOUNTER — OFFICE VISIT (OUTPATIENT)
Dept: FAMILY MEDICINE | Facility: CLINIC | Age: 63
End: 2023-11-01
Payer: COMMERCIAL

## 2023-11-01 VITALS
HEIGHT: 66 IN | WEIGHT: 157.3 LBS | HEART RATE: 55 BPM | DIASTOLIC BLOOD PRESSURE: 70 MMHG | RESPIRATION RATE: 15 BRPM | TEMPERATURE: 97.9 F | BODY MASS INDEX: 25.28 KG/M2 | SYSTOLIC BLOOD PRESSURE: 120 MMHG | OXYGEN SATURATION: 98 %

## 2023-11-01 DIAGNOSIS — E11.9 TYPE 2 DIABETES MELLITUS WITHOUT COMPLICATION, WITHOUT LONG-TERM CURRENT USE OF INSULIN (H): ICD-10-CM

## 2023-11-01 DIAGNOSIS — Z00.00 ROUTINE GENERAL MEDICAL EXAMINATION AT A HEALTH CARE FACILITY: Primary | ICD-10-CM

## 2023-11-01 LAB
CREAT UR-MCNC: 302 MG/DL
HBA1C MFR BLD: 6.5 % (ref 0–5.6)
MICROALBUMIN UR-MCNC: 25.3 MG/L
MICROALBUMIN/CREAT UR: 8.38 MG/G CR (ref 0–17)

## 2023-11-01 PROCEDURE — G0103 PSA SCREENING: HCPCS | Performed by: NURSE PRACTITIONER

## 2023-11-01 PROCEDURE — 82306 VITAMIN D 25 HYDROXY: CPT | Performed by: NURSE PRACTITIONER

## 2023-11-01 PROCEDURE — 80048 BASIC METABOLIC PNL TOTAL CA: CPT | Performed by: NURSE PRACTITIONER

## 2023-11-01 PROCEDURE — 99213 OFFICE O/P EST LOW 20 MIN: CPT | Mod: 25 | Performed by: NURSE PRACTITIONER

## 2023-11-01 PROCEDURE — 82570 ASSAY OF URINE CREATININE: CPT | Performed by: NURSE PRACTITIONER

## 2023-11-01 PROCEDURE — 82043 UR ALBUMIN QUANTITATIVE: CPT | Performed by: NURSE PRACTITIONER

## 2023-11-01 PROCEDURE — 80061 LIPID PANEL: CPT | Performed by: NURSE PRACTITIONER

## 2023-11-01 PROCEDURE — 36415 COLL VENOUS BLD VENIPUNCTURE: CPT | Performed by: NURSE PRACTITIONER

## 2023-11-01 PROCEDURE — 83036 HEMOGLOBIN GLYCOSYLATED A1C: CPT | Performed by: NURSE PRACTITIONER

## 2023-11-01 PROCEDURE — 99396 PREV VISIT EST AGE 40-64: CPT | Mod: 25 | Performed by: NURSE PRACTITIONER

## 2023-11-01 ASSESSMENT — ENCOUNTER SYMPTOMS
HEADACHES: 0
DIZZINESS: 0
CHILLS: 0
DIARRHEA: 0
SHORTNESS OF BREATH: 0
PARESTHESIAS: 0
PALPITATIONS: 0
FREQUENCY: 0
HEARTBURN: 0
DYSURIA: 0
MYALGIAS: 0
COUGH: 0
ARTHRALGIAS: 0
WEAKNESS: 0
CONSTIPATION: 0
EYE PAIN: 0
NAUSEA: 0
ABDOMINAL PAIN: 0
FEVER: 0
HEMATURIA: 0
NERVOUS/ANXIOUS: 0
HEMATOCHEZIA: 0
JOINT SWELLING: 0
SORE THROAT: 0

## 2023-11-01 ASSESSMENT — PAIN SCALES - GENERAL: PAINLEVEL: NO PAIN (0)

## 2023-11-01 NOTE — PROGRESS NOTES
SUBJECTIVE:   CC: Callum is an 63 year old who presents for preventative health visit.       11/1/2023     3:47 PM   Additional Questions   Roomed by MERLIN Chowdary   Accompanied by self         11/1/2023     3:47 PM   Patient Reported Additional Medications   Patient reports taking the following new medications none       Healthy Habits:     Getting at least 3 servings of Calcium per day:  Yes    Bi-annual eye exam:  Yes    Dental care twice a year:  NO    Sleep apnea or symptoms of sleep apnea:  None    Diet:  Vegetarian/vegan    Frequency of exercise:  6-7 days/week    Duration of exercise:  Greater than 60 minutes    Taking medications regularly:  Not Applicable    Medication side effects:  Not applicable    Additional concerns today:  No    He has been exercising regularly and watching his diet.  He is not checking blood sugars.                   Social History     Tobacco Use    Smoking status: Never    Smokeless tobacco: Never   Substance Use Topics    Alcohol use: Yes     Comment: 3 times a month              11/1/2023     3:36 PM   Alcohol Use   Prescreen: >3 drinks/day or >7 drinks/week? No          No data to display                Last PSA:   PSA   Date Value Ref Range Status   02/22/2021 0.49 0 - 4 ug/L Final     Comment:     Assay Method:  Chemiluminescence using Siemens Vista analyzer     Prostate Specific Antigen Screen   Date Value Ref Range Status   02/28/2022 0.75 0.00 - 4.00 ug/L Final       Reviewed orders with patient. Reviewed health maintenance and updated orders accordingly - Yes      Reviewed and updated as needed this visit by clinical staff   Tobacco  Allergies  Meds              Reviewed and updated as needed this visit by Provider                     Review of Systems   Constitutional:  Negative for chills and fever.   HENT:  Negative for congestion, ear pain, hearing loss and sore throat.    Eyes:  Negative for pain and visual disturbance.   Respiratory:  Negative for cough and shortness  "of breath.    Cardiovascular:  Negative for chest pain, palpitations and peripheral edema.   Gastrointestinal:  Negative for abdominal pain, constipation, diarrhea, heartburn, hematochezia and nausea.   Genitourinary:  Negative for dysuria, frequency, genital sores, hematuria, impotence, penile discharge and urgency.   Musculoskeletal:  Negative for arthralgias, joint swelling and myalgias.   Skin:  Negative for rash.   Neurological:  Negative for dizziness, weakness, headaches and paresthesias.   Psychiatric/Behavioral:  Negative for mood changes. The patient is not nervous/anxious.          OBJECTIVE:   /70 (BP Location: Right arm, Patient Position: Sitting, Cuff Size: Adult Regular)   Pulse 55   Temp 97.9  F (36.6  C) (Temporal)   Resp 15   Ht 1.664 m (5' 5.5\")   Wt 71.4 kg (157 lb 4.8 oz)   SpO2 98%   BMI 25.78 kg/m      Physical Exam  GENERAL: healthy, alert and no distress  EYES: Eyes grossly normal to inspection, PERRL and conjunctivae and sclerae normal  HENT: ear canals and TM's normal, nose and mouth without ulcers or lesions  NECK: no adenopathy, no asymmetry, masses, or scars and thyroid normal to palpation  RESP: lungs clear to auscultation - no rales, rhonchi or wheezes  CV: regular rate and rhythm, normal S1 S2, no S3 or S4, no murmur, click or rub, no peripheral edema and peripheral pulses strong  ABDOMEN: soft, nontender, no hepatosplenomegaly, no masses and bowel sounds normal  MS: no gross musculoskeletal defects noted, no edema  SKIN: no suspicious lesions or rashes  NEURO: Normal strength and tone, mentation intact and speech normal  PSYCH: mentation appears normal, affect normal/bright        ASSESSMENT/PLAN:   (Z00.00) Routine general medical examination at a health care facility  (primary encounter diagnosis)  Comment:   Plan: PSA, screen, Vitamin D Deficiency            (E11.9) Type 2 diabetes mellitus without complication, without long-term current use of insulin (H)  Comment: " "at goal  Plan: HEMOGLOBIN A1C, Lipid panel reflex to direct         LDL Fasting, Basic metabolic panel  (Ca, Cl,         CO2, Creat, Gluc, K, Na, BUN), Albumin Random         Urine Quantitative with Creat Ratio        Continue working on diet and exercise.           COUNSELING:   Reviewed preventive health counseling, as reflected in patient instructions      BMI:   Estimated body mass index is 25.78 kg/m  as calculated from the following:    Height as of this encounter: 1.664 m (5' 5.5\").    Weight as of this encounter: 71.4 kg (157 lb 4.8 oz).         He reports that he has never smoked. He has never used smokeless tobacco.            Stephie Lewis NP  [facility]  "

## 2023-11-02 LAB
ANION GAP SERPL CALCULATED.3IONS-SCNC: 9 MMOL/L (ref 7–15)
BUN SERPL-MCNC: 17.3 MG/DL (ref 8–23)
CALCIUM SERPL-MCNC: 9.6 MG/DL (ref 8.8–10.2)
CHLORIDE SERPL-SCNC: 105 MMOL/L (ref 98–107)
CHOLEST SERPL-MCNC: 212 MG/DL
CREAT SERPL-MCNC: 0.8 MG/DL (ref 0.67–1.17)
DEPRECATED HCO3 PLAS-SCNC: 27 MMOL/L (ref 22–29)
EGFRCR SERPLBLD CKD-EPI 2021: >90 ML/MIN/1.73M2
GLUCOSE SERPL-MCNC: 106 MG/DL (ref 70–99)
HDLC SERPL-MCNC: 55 MG/DL
LDLC SERPL CALC-MCNC: 144 MG/DL
NONHDLC SERPL-MCNC: 157 MG/DL
POTASSIUM SERPL-SCNC: 4.2 MMOL/L (ref 3.4–5.3)
PSA SERPL DL<=0.01 NG/ML-MCNC: 0.69 NG/ML (ref 0–4.5)
SODIUM SERPL-SCNC: 141 MMOL/L (ref 135–145)
TRIGL SERPL-MCNC: 67 MG/DL
VIT D+METAB SERPL-MCNC: 44 NG/ML (ref 20–50)

## 2023-11-17 ENCOUNTER — TRANSFERRED RECORDS (OUTPATIENT)
Dept: MULTI SPECIALTY CLINIC | Facility: CLINIC | Age: 63
End: 2023-11-17
Payer: COMMERCIAL

## 2023-11-17 LAB — RETINOPATHY: NORMAL

## 2024-01-02 ENCOUNTER — ALLIED HEALTH/NURSE VISIT (OUTPATIENT)
Dept: FAMILY MEDICINE | Facility: CLINIC | Age: 64
End: 2024-01-02
Payer: COMMERCIAL

## 2024-01-02 ENCOUNTER — VIRTUAL VISIT (OUTPATIENT)
Dept: FAMILY MEDICINE | Facility: CLINIC | Age: 64
End: 2024-01-02
Payer: COMMERCIAL

## 2024-01-02 ENCOUNTER — NURSE TRIAGE (OUTPATIENT)
Dept: NURSING | Facility: CLINIC | Age: 64
End: 2024-01-02

## 2024-01-02 DIAGNOSIS — J06.9 VIRAL UPPER RESPIRATORY TRACT INFECTION: ICD-10-CM

## 2024-01-02 DIAGNOSIS — J06.9 VIRAL UPPER RESPIRATORY TRACT INFECTION: Primary | ICD-10-CM

## 2024-01-02 DIAGNOSIS — J10.1 INFLUENZA DUE TO INFLUENZA VIRUS, TYPE A, HUMAN: Primary | ICD-10-CM

## 2024-01-02 LAB
FLUAV RNA SPEC QL NAA+PROBE: POSITIVE
FLUBV RNA RESP QL NAA+PROBE: NEGATIVE
RSV RNA SPEC NAA+PROBE: NEGATIVE
SARS-COV-2 RNA RESP QL NAA+PROBE: NEGATIVE

## 2024-01-02 PROCEDURE — 87637 SARSCOV2&INF A&B&RSV AMP PRB: CPT | Performed by: NURSE PRACTITIONER

## 2024-01-02 PROCEDURE — 99207 PR NO CHARGE NURSE ONLY: CPT

## 2024-01-02 PROCEDURE — 99213 OFFICE O/P EST LOW 20 MIN: CPT | Mod: 93 | Performed by: NURSE PRACTITIONER

## 2024-01-02 RX ORDER — OSELTAMIVIR PHOSPHATE 75 MG/1
75 CAPSULE ORAL 2 TIMES DAILY
Qty: 10 CAPSULE | Refills: 0 | Status: SHIPPED | OUTPATIENT
Start: 2024-01-02 | End: 2024-01-07

## 2024-01-02 NOTE — PROGRESS NOTES
Swab completed as ordered. Gave patient a printed copy of work letter.     Kalyani Fraser RN  [facility]

## 2024-01-02 NOTE — LETTER
2024      RE: Callum Haywood  [address]  [place] [address]  MRN: [id number]  : 1960      To Whom It May Concern:    Callum Haywood was seen in the [facility] on 2024.    Please excuse Callum Haywood from work, starting 2024 until 2024 due to illness. Mr. Haywood may return to work sooner as symptoms improve without work restrictions.     Sincerely,    Osito Young, JORDY, FNP-BC

## 2024-01-02 NOTE — PROGRESS NOTES
"Callum is a 63 year old who is being evaluated via a billable telephone visit.      What phone number would you like to be contacted at? [phone]  How would you like to obtain your AVS? Cm    Distant Location (provider location):  Off-site    Assessment & Plan     Influenza due to influenza virus, type A, human  Discussed starting antiviral tamiflu; continue symptom management for cough-suppressant prn, fever/myalgia-nsaids and/or antipyretic as needed per directed;    - oseltamivir (TAMIFLU) 75 MG capsule  Dispense: 10 capsule; Refill: 0    Viral upper respiratory tract infection  - Symptomatic Influenza A/B, RSV, & SARS-CoV2 PCR (COVID-19) Nasopharyngeal    Follow up in 2-3 days if no improvement or worsening condition  Work excuse given for illness    CHESTER Shah CNP  [facility]    Tata Nolen is a 63 year old, presenting for the following health issues:  URI (URI symptoms started on Saturday 30)        1/2/2024     9:04 AM   Additional Questions   Roomed by Poonam Patrick     63 year old  year old male with PMH   Patient Active Problem List   Diagnosis Code    GERD K21.9    IBS K58.9    PSYCHOSEXUAL DISORDER F65.89    HYPERLIPIDEMIA LDL GOAL <160 E78.5    Posttraumatic stress disorder F43.10    Health Care Home Z76.89    Chronic bilateral low back pain with bilateral sciatica M54.42, M54.41, G89.29    Diabetes mellitus, type 2 (H) E11.9     in clinic for acute office visit related to/establish care/to discuss     - recent trip from [place] c/o fatigue, shortness of breath, weakness, fever; \"flu like symptoms\" started on Friday, tylenol, delsym, cough; sore throat    History of Present Illness       Reason for visit:  I had flu like symptoms starting Saturday (3 days ago)  Symptoms include:  Body Fatigue and/or weakness, fever, cough, stuffy nose, andshortness of breathe, and  Symptom intensity:  Moderate  Symptom progression:  Staying the same  Had these symptoms " before:  No  What makes it worse:  If I go outside.  What makes it better:  When I'm at rest.    He eats 0-1 servings of fruits and vegetables daily.He consumes 0 sweetened beverage(s) daily.He exercises with enough effort to increase his heart rate 30 to 60 minutes per day.  He exercises with enough effort to increase his heart rate 5 days per week.   He is taking medications regularly.               Review of Systems         Objective           Vitals:  No vitals were obtained today due to virtual visit.    Physical Exam   alert and no distress  PSYCH: Alert and oriented times 3; coherent speech, normal   rate and volume, able to articulate logical thoughts, able   to abstract reason, no tangential thoughts, no hallucinations   or delusions  His affect is normal  RESP: No cough, no audible wheezing, able to talk in full sentences  Remainder of exam unable to be completed due to telephone visits    Results for orders placed or performed in visit on 01/02/24 (from the past 24 hour(s))   Symptomatic Influenza A/B, RSV, & SARS-CoV2 PCR (COVID-19) Nasopharyngeal    Specimen: Nasopharyngeal; Swab   Result Value Ref Range    Influenza A PCR Positive (A) Negative    Influenza B PCR Negative Negative    RSV PCR Negative Negative    SARS CoV2 PCR Negative Negative    Narrative    Testing was performed using the Xpert Xpress CoV2/Flu/RSV Assay on the [employer] GeneXpert Instrument. This test should be ordered for the detection of SARS-CoV-2, influenza, and RSV viruses in individuals who meet clinical and/or epidemiological criteria. Test performance is unknown in asymptomatic patients. This test is for in vitro diagnostic use under the FDA EUA for laboratories certified under CLIA to perform high or moderate complexity testing. This test has not been FDA cleared or approved. A negative result does not rule out the presence of PCR inhibitors in the specimen or target RNA in concentration below the limit of detection for the  assay. If only one viral target is positive but coinfection with multiple targets is suspected, the sample should be re-tested with another FDA cleared, approved, or authorized test, if coinfection would change clinical management. This test was validated by the [facility]. These laboratories are certified under the Clinical Laboratory Improvement Amendments of 1988 (CLIA-88) as qualified to perform high complexity laboratory testing.               Phone call duration: 25 minutes

## 2024-01-03 NOTE — RESULT ENCOUNTER NOTE
Results discussed directly with patient while patient was present. Any further details documented in the note.   CHESTER Shah CNP

## 2024-01-03 NOTE — TELEPHONE ENCOUNTER
Pt gave verbal consent to speak with his son Rudy and release any information in chart. Rudy reports pt symptoms have not worsened since his virtual visit. Pt symptoms started Saturday night per Rudy.

## 2024-01-05 ENCOUNTER — TELEPHONE (OUTPATIENT)
Dept: FAMILY MEDICINE | Facility: CLINIC | Age: 64
End: 2024-01-05
Payer: COMMERCIAL

## 2024-01-05 NOTE — TELEPHONE ENCOUNTER
Pt son calling to confirm dosing of Tamiflu prescribed 1/2/24.  Confirmed in OV note and RX sig that dose is one pill twice daily; caller had thought dosing was one tablet per day.    Hellen MCGRAW RN  [facility]

## 2024-01-20 ENCOUNTER — OFFICE VISIT (OUTPATIENT)
Dept: URGENT CARE | Facility: URGENT CARE | Age: 64
End: 2024-01-20
Payer: COMMERCIAL

## 2024-01-20 VITALS
SYSTOLIC BLOOD PRESSURE: 120 MMHG | BODY MASS INDEX: 26.03 KG/M2 | HEIGHT: 66 IN | DIASTOLIC BLOOD PRESSURE: 68 MMHG | HEART RATE: 61 BPM | WEIGHT: 162 LBS | TEMPERATURE: 97.3 F

## 2024-01-20 DIAGNOSIS — M54.41 ACUTE BILATERAL LOW BACK PAIN WITH BILATERAL SCIATICA: Primary | ICD-10-CM

## 2024-01-20 DIAGNOSIS — M54.42 ACUTE BILATERAL LOW BACK PAIN WITH BILATERAL SCIATICA: Primary | ICD-10-CM

## 2024-01-20 PROCEDURE — 99213 OFFICE O/P EST LOW 20 MIN: CPT | Performed by: PHYSICIAN ASSISTANT

## 2024-01-20 RX ORDER — PREDNISONE 20 MG/1
20 TABLET ORAL 2 TIMES DAILY
Qty: 10 TABLET | Refills: 0 | Status: SHIPPED | OUTPATIENT
Start: 2024-01-20 | End: 2024-01-25

## 2024-01-20 RX ORDER — CYCLOBENZAPRINE HCL 10 MG
10 TABLET ORAL 3 TIMES DAILY PRN
Qty: 21 TABLET | Refills: 0 | Status: SHIPPED | OUTPATIENT
Start: 2024-01-20 | End: 2024-02-29

## 2024-01-20 NOTE — PROGRESS NOTES
Acute bilateral low back pain with bilateral sciatica  - predniSONE (DELTASONE) 20 MG tablet; Take 1 tablet (20 mg) by mouth 2 times daily for 5 days  - cyclobenzaprine (FLEXERIL) 10 MG tablet; Take 1 tablet (10 mg) by mouth 3 times daily as needed for muscle spasms    Rest the affected area as much as possible.  Apply ice for 15-20 minutes intermittently as needed and especially after any offending activity. Hot packs are better for muscle spasms and cramping. Daily stretching as tolerated.  As pain recedes, begin normal activities slowly as tolerated.  Consider Physical Therapy after 6 weeks if symptoms not better with conservative care.      Okay to take acetaminophen 500 mg- 2 tabs (Total of 1000 mg) every 8 hrs   Okay to take ibuprofen 200 mg- 3 tabs (Total of 600 mg) every 6 hours      Patient was advised to return to clinic for reevaluation (either UC or PCP) if symptoms do not improve in 14 days. Patient educated on red flag symptoms and asked to go directly to the ED if these symptoms present themselves.       Pasha Foss PA-C  [facility]    Subjective   63 year old who presents to clinic today for the following health issues:    Urgent Care and Musculoskeletal Problem       HPI   Where in your body do you have pain? Back Pain  Onset/Duration: Pt in clinic to have eval for low back pain and gluteal pain. Lat Friday- Patient was standing in a certain way and had sudden pain  Description:   Location of pain: low back bilateral  Character of pain: dull ache  Pain radiation: none  New numbness or weakness in legs, not attributed to pain: no   Intensity: Currently 5/10, At its worst 9/10  Progression of Symptoms: improving  History:   Specific cause: none  Pain interferes with job: No  History of back problems: recurrent self limited episodes of low back pain in the past  Any previous MRI or X-rays: None  Sees a specialist for back pain: No  Alleviating factors:   Improved by:  acetaminophen (Tylenol), heat, and NSAIDs    Precipitating factors:  Worsened by: Bending and Sitting    Review of Systems   Review of Systems   See HPI    Objective    Temp: 97.3  F (36.3  C) Temp src: Temporal BP: 120/68 Pulse: 61             Physical Exam   Physical Exam  Constitutional:       General: He is not in acute distress.     Appearance: Normal appearance. He is normal weight. He is not ill-appearing, toxic-appearing or diaphoretic.   HENT:      Head: Normocephalic and atraumatic.   Cardiovascular:      Rate and Rhythm: Normal rate.      Pulses: Normal pulses.   Pulmonary:      Effort: Pulmonary effort is normal. No respiratory distress.   Musculoskeletal:        Back:       Comments: Patient has tenderness in the area shown above without local erythema, deformity, or swelling.  Patient has full range of motion of his lumbar spine.  He seems to have more pain with forward flexion.  Straight leg raise seems to cause low back pain but does not send shooting pains down legs.  Patient seems to have full strength in both legs.  HAMILTON test is positive bilaterally. No midline tenderness.   Neurological:      General: No focal deficit present.      Mental Status: He is alert and oriented to person, place, and time. Mental status is at baseline.      Gait: Gait normal.   Psychiatric:         Mood and Affect: Mood normal.         Behavior: Behavior normal.         Thought Content: Thought content normal.         Judgment: Judgment normal.          No results found for this or any previous visit (from the past 24 hour(s)).

## 2024-02-03 ENCOUNTER — HEALTH MAINTENANCE LETTER (OUTPATIENT)
Age: 64
End: 2024-02-03

## 2024-02-04 ENCOUNTER — OFFICE VISIT (OUTPATIENT)
Dept: URGENT CARE | Facility: URGENT CARE | Age: 64
End: 2024-02-04
Payer: COMMERCIAL

## 2024-02-04 VITALS
WEIGHT: 159 LBS | BODY MASS INDEX: 25.55 KG/M2 | OXYGEN SATURATION: 98 % | SYSTOLIC BLOOD PRESSURE: 141 MMHG | HEART RATE: 69 BPM | TEMPERATURE: 97.9 F | DIASTOLIC BLOOD PRESSURE: 71 MMHG | HEIGHT: 66 IN

## 2024-02-04 DIAGNOSIS — M54.41 ACUTE RIGHT-SIDED LOW BACK PAIN WITH RIGHT-SIDED SCIATICA: Primary | ICD-10-CM

## 2024-02-04 PROCEDURE — 99213 OFFICE O/P EST LOW 20 MIN: CPT | Mod: 25 | Performed by: PHYSICIAN ASSISTANT

## 2024-02-04 PROCEDURE — 96372 THER/PROPH/DIAG INJ SC/IM: CPT | Performed by: PHYSICIAN ASSISTANT

## 2024-02-04 RX ORDER — KETOROLAC TROMETHAMINE 30 MG/ML
60 INJECTION, SOLUTION INTRAMUSCULAR; INTRAVENOUS ONCE
Status: COMPLETED | OUTPATIENT
Start: 2024-02-04 | End: 2024-02-04

## 2024-02-04 RX ORDER — NAPROXEN SODIUM 550 MG/1
550 TABLET ORAL 2 TIMES DAILY WITH MEALS
Qty: 14 TABLET | Refills: 0 | Status: SHIPPED | OUTPATIENT
Start: 2024-02-05 | End: 2024-02-12

## 2024-02-04 RX ADMIN — KETOROLAC TROMETHAMINE 60 MG: 30 INJECTION, SOLUTION INTRAMUSCULAR; INTRAVENOUS at 14:08

## 2024-02-04 ASSESSMENT — ENCOUNTER SYMPTOMS: BACK PAIN: 1

## 2024-02-04 NOTE — LETTER
February 4, 2024      Callum Haywood  [address]  [place] [address]        To Whom It May Concern:    Callum Haywood  was seen on 02/04/24. Please excuse him from work until his symptoms improve due to illness.        Sincerely,        Aracelis Norwood PA-C

## 2024-02-04 NOTE — PATIENT INSTRUCTIONS
Perform hot epsom salt soaks, light stretching, lidocaine patches as needed. Tylenol as needed for pain     Follow-up with Spine Specialist for further evaluation

## 2024-02-04 NOTE — PROGRESS NOTES
Assessment & Plan        1. Acute right-sided low back pain with right-sided sciatica    - ketorolac (TORADOL) injection 60 mg was administered in the clinic.  Patient tolerated the medication well.  He did report reduction in pain to 7 out of 10 after 20 minutes.  -Patient was referred to Spine specialist for further evaluation  -Naproxen as needed starting tomorrow  - Spine  Referral; Future  - naproxen sodium (ANAPROX) 550 MG tablet; Take 1 tablet (550 mg) by mouth 2 times daily (with meals) for 7 days  Dispense: 14 tablet; Refill: 0      Patient Instructions   Perform hot epsom salt soaks, light stretching, lidocaine patches as needed. Tylenol as needed for pain     Follow-up with Spine Specialist for further evaluation        Return for Follow up with Spine.    At the end of the encounter, I discussed results, diagnosis, medications. Discussed red flags for immediate return to clinic/ER, as well as indications for follow up if no improvement. Patient and son understood and agreed to plan. Patient was stable for discharge.    Tata Nolen is a 63 year old male who presents to clinic today with son for the following health issues:  Chief Complaint   Patient presents with    Urgent Care     Pt in clinic c/o right side hip pain that radiates into right leg    Pain     HPI    Patient presents to the clinic with his son.  Patient reports right buttock pain which radiates to the posterior thigh and right leg.  Patient's son reports symptoms started 2 days ago.  He rates the pain at 8/10. Pain worsens with sitting.  Standing and walking okay. Patient was seen in the clinic for acute bilateral low back pain with bilateral sciatica 2 weeks ago.    Patient was prescribed prednisone 20 mg twice daily for 5 days.  He was also prescribed Flexeril as needed.  Patient reports the prednisone and Flexeril helped with his symptoms.  He is out of the prednisone.  Patient has a history of acute back pain with  "sciatica.  He has never been seen by spine specialist or physical therapist.  He was last seen in primary care clinic over 11 months ago for this problem.  At that appointment physical therapy was discussed.  Patient is a runner.  He is very active.  Patient reports the pain has been interfering with his exercises.  He denies bowel and bladder incontinence and saddle anaesthesia    Review of Systems   Musculoskeletal:  Positive for back pain.       Problem List:  2022-03: Diabetes mellitus, type 2 (H)  2020-09: Chronic bilateral low back pain with bilateral sciatica  2015-08: Low back pain  2013-10: Health Care Home  2013-10: Posttraumatic stress disorder  2012-06: Leg pain  2010-10: HYPERLIPIDEMIA LDL GOAL <160  2007-09: HYPERCHOLESTEROLEMIA  2005-08: PSYCHOSEXUAL DISORDER  2004-08: GERD  2004-08: IBS      Past Medical History:   Diagnosis Date    DDD (degenerative disc disease), lumbar     Esophageal reflux     Helicobacter pylori (H. pylori)     Irritable bowel syndrome     Low back pain     Other specified psychosexual disorder     Pure hypercholesterolemia        Social History     Tobacco Use    Smoking status: Never    Smokeless tobacco: Never   Substance Use Topics    Alcohol use: Yes     Comment: 3 times a month            Objective    BP (!) 141/71   Pulse 69   Temp 97.9  F (36.6  C) (Temporal)   Ht 1.664 m (5' 5.5\")   Wt 72.1 kg (159 lb)   SpO2 98%   BMI 26.06 kg/m    Physical Exam  Vitals and nursing note reviewed.   Constitutional:       Appearance: Normal appearance.   Cardiovascular:      Rate and Rhythm: Normal rate and regular rhythm.   Pulmonary:      Effort: Pulmonary effort is normal.      Breath sounds: Normal breath sounds.   Musculoskeletal:         General: Normal range of motion.      Cervical back: Normal range of motion and neck supple.      Lumbar back: Normal. Negative right straight leg raise test and negative left straight leg raise test.        Back:       Comments: Tenderness " in the right buttock area. Straight leg raise is negative.    Skin:     General: Skin is warm and dry.      Findings: No rash.   Neurological:      General: No focal deficit present.      Mental Status: He is alert and oriented to person, place, and time.      Cranial Nerves: Cranial nerves 2-12 are intact.      Sensory: Sensation is intact.      Motor: Motor function is intact.      Gait: Gait is intact.      Deep Tendon Reflexes:      Reflex Scores:       Patellar reflexes are 2+ on the right side and 2+ on the left side.       Achilles reflexes are 2+ on the right side and 2+ on the left side.  Psychiatric:         Mood and Affect: Mood normal.         Behavior: Behavior normal.              Aracelis Norwood PA-C

## 2024-02-05 ENCOUNTER — TELEPHONE (OUTPATIENT)
Dept: FAMILY MEDICINE | Facility: CLINIC | Age: 64
End: 2024-02-05
Payer: COMMERCIAL

## 2024-02-05 NOTE — TELEPHONE ENCOUNTER
Pt's son (Anil) called, requesting UC follow-up appt. No CTC on file for son. Attempted to conference call pt for verbal consent to speak to son but no answer. Son tried to conference call pt from his phone but call got disconnected. RN attempted to call back but phone was busy.     Kimberly MCGRAW RN  [facility]

## 2024-02-06 ENCOUNTER — OFFICE VISIT (OUTPATIENT)
Dept: PHYSICAL MEDICINE AND REHAB | Facility: CLINIC | Age: 64
End: 2024-02-06
Attending: PHYSICIAN ASSISTANT
Payer: COMMERCIAL

## 2024-02-06 VITALS
BODY MASS INDEX: 25.55 KG/M2 | DIASTOLIC BLOOD PRESSURE: 85 MMHG | SYSTOLIC BLOOD PRESSURE: 193 MMHG | WEIGHT: 159 LBS | HEART RATE: 59 BPM | HEIGHT: 66 IN

## 2024-02-06 DIAGNOSIS — M54.41 ACUTE RIGHT-SIDED LOW BACK PAIN WITH RIGHT-SIDED SCIATICA: ICD-10-CM

## 2024-02-06 PROCEDURE — 99204 OFFICE O/P NEW MOD 45 MIN: CPT | Performed by: NURSE PRACTITIONER

## 2024-02-06 RX ORDER — CYCLOBENZAPRINE HCL 10 MG
10 TABLET ORAL 3 TIMES DAILY PRN
Qty: 45 TABLET | Refills: 0 | Status: SHIPPED | OUTPATIENT
Start: 2024-02-06 | End: 2024-03-21

## 2024-02-06 ASSESSMENT — PAIN SCALES - GENERAL: PAINLEVEL: SEVERE PAIN (6)

## 2024-02-06 NOTE — LETTER
February 6, 2024      Callum Haywood  [address]  [place] [address]        To Whom It May Concern:    Callum Haywood was seen in our clinic. He may return to work with the following restrictions: no lifting >10lbs. Avoid repetitive bending, twisting, pushing, pulling. Must be able to sit and take breaks for ice/heat as needed. If you have questions please feel free to contact my office.      Sincerely,          Deborah VERONICA  [facility]  O. [phone]

## 2024-02-06 NOTE — LETTER
2/6/2024         RE: Callum Haywood  [address]  [place] [address]        Dear Colleague,    Thank you for referring your patient, Callum Haywood, to the [facility]. Please see a copy of my visit note below.    ASSESSMENT: Callum Haywood is a 63 year old male who presents for consultation at the request of PCP Stephie Lewis, who presents today for new patient evaluation of:    -lumbar radiculopathy    Patient is neurologically intact on exam. No myelopathic or red flag symptoms. His right leg pain is consistent with lumbar radiculopathy.  We discussed his lumbar XR done earlier this year, which show some mild to moderate facet arthropathy in the lower lumbar spine. He feels he would like to know the cause of his symptoms and he has been doing his own PT-based exercises for several weeks without improvement so far. We talked about ordering a lumbar MRI for treatment planning of a potential lumbar epidural steroid injection. We talked about the potential that insurance may require physical therapy prior to approval.    I did recommend he continue his naproxen for a few more doses to fully evaluate response to this medication. If pain does not improve, would likely switch to medrol pack (given his significant relief with prednisone course in january) and then choose a different rx NSAID for him to resume afterwards if still having lingering pain. I refilled his flexeril as well. We will call him in 2 days to see how he is doing and consider options. If doing well with naproxen would continue this and refer for PT.        11/11/2020     8:00 AM   OSWESTRY DISABILITY INDEX   Count 8   Sum 7   Oswestry Score (%) 17.5 %            Diagnoses and all orders for this visit:  Acute right-sided low back pain with right-sided sciatica  -     Spine  Referral  -     MR Lumbar Spine w/o Contrast; Future  -     cyclobenzaprine (FLEXERIL) 10 MG tablet;  Take 1 tablet (10 mg) by mouth 3 times daily as needed for muscle spasms      PLAN:  Reviewed spine anatomy and disease process. Discussed diagnosis and treatment options with the patient today. A shared decision making model was used.  The patient's values and choices were respected. The following represents what was discussed and decided upon by the provider and the patient.      -DIAGNOSTIC TESTS:  Images were personally reviewed and interpreted and explained to patient today using a spine model.   --I did not order any new diagnostic tests today     -PHYSICAL THERAPY:    --pain level prohibitive to PT at this time however we did discuss the importance of core strengthening, ROM, stretching exercises with the patient and how each of these entities is important in decreasing pain.  Explained to the patient that the purpose of physical therapy is to teach the patient a home exercise program.  These exercises need to be performed every day in order to decrease pain and prevent future occurrences of pain.        -MEDICATIONS:    --continue naproxen twice daily as directed for another 1-2 days prior to possible medication changes  -refilled flexeril 10mg TID PRN today.  -tylenol otc as directed prn (patient states he will take if needed but wont plan on it)  Discussed multiple medication options today with patient. Discussed risks, side effects, and proper use of medications. Patient verbalized understanding.    -INTERVENTIONS:    Discussed the role of injections with patient today. Patient would be a good candidate in the future for either epidural steroid injections or medial branch blocks if indicated based on symptoms and supported by imaging results.    -PATIENT EDUCATION:  Total time of 40 minutes, on the day of service, spent with the patient, reviewing the chart, placing orders, and documenting.   -Today we also discussed the pros and cons of the current treatment plan.    -FOLLOW-UP:   follow up in person to  review lumbar MRI     Advised patient to call the Spine Center if symptoms worsen, new numbness or weakness develops in the legs, or if they develop new or worsening problems controlling bladder or bowel function.   ______________________________________________________________________    SUBJECTIVE:    HPI:  Callum Haywood  Is a 63 year old male hx GERD, IBS who presents today for new patient evaluation of lumbar radiculopathy     Patient's symptoms began suddenly in January while standing. Originally had pain in the left low back and left gluteal region. He was seen at urgent care on 1/20 and treated with prednisone and flexeril which really helped.     Then, 4 days ago, he woke up with R lower pain and R gluteal region, right hip, and in the posterior thigh and calf. The pain was worse with sitting to a 9/10. Improves to a 5/10 with standing and walking. Whenever he went to drive,  he would have to sit on his left butt cheek. Describes the pain as an electric shock. The next day, he went to the gym which he admits was a bad idea. The following day, the pain was so bad he could not drive and he went to see his PCP.     Today the pain is a 6/10. It is a 9/10 at worst, 6/10 at best. The pain is worse with sitting. Improves with standing and walking. He was seen by his pcp on 2/4 and given a toradol injection which really helped and took tylenol as well. Yesterday he went to the pharmacy and picked up the naproxen 550mg twice daily. He has not noticed a significant difference yet. Today he has new pain in the right calf. He has tingling in the same distribution.      He denies any leg weakness, or changes in bowel or bladder control.    He has not done any PT. He has been doing exercises from a prior PT course years ago on a daily basis without significant relief. He is an avid exerciser, runs daily, does exercises with weights. 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    He has had previous injections but states he does not recall any relief after these.  L5-S1 translaminar epidural steroid injection July 2020  L4-5 translaminar epidural steroid injection may 2020    He had some lumbar xr taken in March of 2023      -Treatment to Date:     -Medications:  Prednisone  Flexeril 10mg  Naproxen 550mg    Current Outpatient Medications   Medication     cholecalciferol (VITAMIN D) 1000 UNIT tablet     cyclobenzaprine (FLEXERIL) 10 MG tablet     cyclobenzaprine (FLEXERIL) 10 MG tablet     naproxen sodium (ANAPROX) 550 MG tablet     No current facility-administered medications for this visit.       Allergies   Allergen Reactions     No Known Allergies      Seasonal Allergies        Past Medical History:   Diagnosis Date     DDD (degenerative disc disease), lumbar      Esophageal reflux      Helicobacter pylori (H. pylori)      Irritable bowel syndrome      Low back pain      Other specified psychosexual disorder      Pure hypercholesterolemia         Patient Active Problem List   Diagnosis     GERD     IBS     PSYCHOSEXUAL DISORDER     HYPERLIPIDEMIA LDL GOAL <160     Posttraumatic stress disorder     Health Care Home     Chronic bilateral low back pain with bilateral sciatica     Diabetes mellitus, type 2 (H)       Past Surgical History:   Procedure Laterality Date     COLONOSCOPY N/A 5/12/2023    Procedure: COLONOSCOPY, WITH POLYPECTOMY;  Surgeon: Mando Bowen MD;  Location: [facility] [place] [facility]     NO HISTORY OF SURGERY         Family History   Problem Relation Age of Onset     C.A.D. No family hx of      Diabetes No family hx of      Hypertension No family hx of      Cerebrovascular Disease No family hx of      Breast Cancer No family hx of      Cancer - colorectal No family hx of      Prostate Cancer No family hx of      Alcohol/Drug No family hx of      Allergies No family hx of      Alzheimer Disease No family hx of        Reviewed past medical, surgical, and family history with  "patient found on new patient intake packet located in EMR [employer] tab.     SOCIAL HX: nonsmoker, no alcohol use, no rec drug use, no heavy drinking    ROS: positive for sciatica. Specifically negative for bowel/bladder dysfunction, balance changes, headache, dizziness, foot drop, fevers, chills, appetite changes, nausea/vomiting, unexplained weight loss. Otherwise 13 systems reviewed are negative. Please see the patient's intake questionnaire from today for details.    OBJECTIVE:  BP (!) 193/85   Pulse 59   Ht 5' 5.5\" (1.664 m)   Wt 159 lb (72.1 kg)   BMI 26.06 kg/m      PHYSICAL EXAMINATION:    --CONSTITUTIONAL:  Vital signs as above.  No acute distress.  The patient is well nourished and well groomed.  --PSYCHIATRIC:  Appropriate mood and affect. The patient is awake, alert, oriented to person, place, time and answering questions appropriately with clear speech.    --SKIN:  Skin over the face, bilateral lower extremities, and posterior torso is clean, dry, intact without rashes.    --RESPIRATORY: Normal rhythm and effort. No abnormal accessory muscle breathing patterns noted.   --ABDOMINAL:  Non-distended.    --GROSS MOTOR: Gait is non-antalgic. Easily arises from a seated position. Toe walking and heel walking are normal however pain is exacerbated with heel walking      --LOWER EXTREMITY MOTOR TESTING:  Hip flexion: right 5/5, left 5/5  Hip abduction: right 5/5, left 5/5  Hip adduction: right 5/5, left 5/5   Quads: right 5/5, left 5/5  Hamstrings: right 5/5, left 5/5  Dorsiflexion: right 5/5, left 5/5  Plantar flexion: right 5/5, left 5/5    Great toe MTP extension/EHL: right 5/5, left 5/5    --NEUROLOGICAL:  2/4 patellar and achilles reflexes bilaterally. Sensation to light touch is intact throughout both lower extremities. Babinski is negative. No clonus.    --MUSCULOSKELETAL: Lumbar spine inspection reveals no evidence of deformity. Range of motion is  limited in lumbar flexion, extension, lateral rotation " due to pain. No point tenderness to palpation lumbar spine. Bilateral lumbar paraspinal musculature tenderness.     Straight leg raising is positive on the right    --HIPS: Full range of motion bilaterally. Negative HAMILTON and Negative FADIR bilaterally. Mild R hip joint tenderness to palp.    --SACROILIAC JOINT: Gaenslen's Test was positive on right. Thigh thrust was negative. Sacroiliac Joint Compression Test was positive on right. One finger point test was negative. Positive right SI joint tenderness    --VASCULAR:  Bilateral lower extremities are warm without any discoloration.  There is no pitting edema of the bilateral lower extremities.    RESULTS:   Prior medical records from [facility] and Care Everywhere were reviewed today.    Imaging: Spine imaging was personally reviewed and interpreted today. The images were shown to the patient and the findings were explained using a spine model.    EXAM: XR LUMBAR SPINE 2/3 VIEWS  LOCATION: [facility]  DATE/TIME: 3/10/2023 9:47 AM     INDICATION: low back pain after fall 2 weeks ago  COMPARISON: Lumbar spine radiograph 11/03/2020                                                                      IMPRESSION: There are 5 lumbar type vertebral bodies. There is mild levoconvex curvature of the lumbar spine. No significant loss of disc height. Unchanged mild to moderate facet arthropathy at the lumbosacral junction. No acute fracture.        Deborah RDZP-C  [facility]  O. [phone]             Again, thank you for allowing me to participate in the care of your patient.        Sincerely,        CHESTER Martinez CNP

## 2024-02-06 NOTE — PROGRESS NOTES
ASSESSMENT: Callum Haywood is a 63 year old male who presents for consultation at the request of PCP Stephie Lewis, who presents today for new patient evaluation of:    -lumbar radiculopathy    Patient is neurologically intact on exam. No myelopathic or red flag symptoms. His right leg pain is consistent with lumbar radiculopathy.  We discussed his lumbar XR done earlier this year, which show some mild to moderate facet arthropathy in the lower lumbar spine. He feels he would like to know the cause of his symptoms and he has been doing his own PT-based exercises for several weeks without improvement so far. We talked about ordering a lumbar MRI for treatment planning of a potential lumbar epidural steroid injection. We talked about the potential that insurance may require physical therapy prior to approval.    I did recommend he continue his naproxen for a few more doses to fully evaluate response to this medication. If pain does not improve, would likely switch to medrol pack (given his significant relief with prednisone course in january) and then choose a different rx NSAID for him to resume afterwards if still having lingering pain. I refilled his flexeril as well. We will call him in 2 days to see how he is doing and consider options. If doing well with naproxen would continue this and refer for PT.        11/11/2020     8:00 AM   OSWESTRY DISABILITY INDEX   Count 8   Sum 7   Oswestry Score (%) 17.5 %            Diagnoses and all orders for this visit:  Acute right-sided low back pain with right-sided sciatica  -     Spine  Referral  -     MR Lumbar Spine w/o Contrast; Future  -     cyclobenzaprine (FLEXERIL) 10 MG tablet; Take 1 tablet (10 mg) by mouth 3 times daily as needed for muscle spasms      PLAN:  Reviewed spine anatomy and disease process. Discussed diagnosis and treatment options with the patient today. A shared decision making model was used.  The patient's values and choices  were respected. The following represents what was discussed and decided upon by the provider and the patient.      -DIAGNOSTIC TESTS:  Images were personally reviewed and interpreted and explained to patient today using a spine model.   --I did not order any new diagnostic tests today     -PHYSICAL THERAPY:    --pain level prohibitive to PT at this time however we did discuss the importance of core strengthening, ROM, stretching exercises with the patient and how each of these entities is important in decreasing pain.  Explained to the patient that the purpose of physical therapy is to teach the patient a home exercise program.  These exercises need to be performed every day in order to decrease pain and prevent future occurrences of pain.        -MEDICATIONS:    --continue naproxen twice daily as directed for another 1-2 days prior to possible medication changes  -refilled flexeril 10mg TID PRN today.  -tylenol otc as directed prn (patient states he will take if needed but wont plan on it)  Discussed multiple medication options today with patient. Discussed risks, side effects, and proper use of medications. Patient verbalized understanding.    -INTERVENTIONS:    Discussed the role of injections with patient today. Patient would be a good candidate in the future for either epidural steroid injections or medial branch blocks if indicated based on symptoms and supported by imaging results.    -PATIENT EDUCATION:  Total time of 40 minutes, on the day of service, spent with the patient, reviewing the chart, placing orders, and documenting.   -Today we also discussed the pros and cons of the current treatment plan.    -FOLLOW-UP:   follow up in person to review lumbar MRI     Advised patient to call the Spine Center if symptoms worsen, new numbness or weakness develops in the legs, or if they develop new or worsening problems controlling bladder or bowel function.    ______________________________________________________________________    SUBJECTIVE:    HPI:  Callum Haywood  Is a 63 year old male hx GERD, IBS who presents today for new patient evaluation of lumbar radiculopathy     Patient's symptoms began suddenly in January while standing. Originally had pain in the left low back and left gluteal region. He was seen at urgent care on 1/20 and treated with prednisone and flexeril which really helped.     Then, 4 days ago, he woke up with R lower pain and R gluteal region, right hip, and in the posterior thigh and calf. The pain was worse with sitting to a 9/10. Improves to a 5/10 with standing and walking. Whenever he went to drive,  he would have to sit on his left butt cheek. Describes the pain as an electric shock. The next day, he went to the gym which he admits was a bad idea. The following day, the pain was so bad he could not drive and he went to see his PCP.     Today the pain is a 6/10. It is a 9/10 at worst, 6/10 at best. The pain is worse with sitting. Improves with standing and walking. He was seen by his pcp on 2/4 and given a toradol injection which really helped and took tylenol as well. Yesterday he went to the pharmacy and picked up the naproxen 550mg twice daily. He has not noticed a significant difference yet. Today he has new pain in the right calf. He has tingling in the same distribution.      He denies any leg weakness, or changes in bowel or bladder control.    He has not done any PT. He has been doing exercises from a prior PT course years ago on a daily basis without significant relief. He is an avid exerciser, runs daily, does exercises with weights. 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    He has had previous injections but states he does not recall any relief after these.  L5-S1 translaminar epidural steroid injection July 2020  L4-5 translaminar epidural steroid injection may  2020    He had some lumbar xr taken in March of 2023      -Treatment to Date:     -Medications:  Prednisone  Flexeril 10mg  Naproxen 550mg    Current Outpatient Medications   Medication    cholecalciferol (VITAMIN D) 1000 UNIT tablet    cyclobenzaprine (FLEXERIL) 10 MG tablet    cyclobenzaprine (FLEXERIL) 10 MG tablet    naproxen sodium (ANAPROX) 550 MG tablet     No current facility-administered medications for this visit.       Allergies   Allergen Reactions    No Known Allergies     Seasonal Allergies        Past Medical History:   Diagnosis Date    DDD (degenerative disc disease), lumbar     Esophageal reflux     Helicobacter pylori (H. pylori)     Irritable bowel syndrome     Low back pain     Other specified psychosexual disorder     Pure hypercholesterolemia         Patient Active Problem List   Diagnosis    GERD    IBS    PSYCHOSEXUAL DISORDER    HYPERLIPIDEMIA LDL GOAL <160    Posttraumatic stress disorder    Health Care Home    Chronic bilateral low back pain with bilateral sciatica    Diabetes mellitus, type 2 (H)       Past Surgical History:   Procedure Laterality Date    COLONOSCOPY N/A 5/12/2023    Procedure: COLONOSCOPY, WITH POLYPECTOMY;  Surgeon: Mando Bowen MD;  Location: [facility] [place]    NO HISTORY OF SURGERY         Family History   Problem Relation Age of Onset    C.A.D. No family hx of     Diabetes No family hx of     Hypertension No family hx of     Cerebrovascular Disease No family hx of     Breast Cancer No family hx of     Cancer - colorectal No family hx of     Prostate Cancer No family hx of     Alcohol/Drug No family hx of     Allergies No family hx of     Alzheimer Disease No family hx of        Reviewed past medical, surgical, and family history with patient found on new patient intake packet located in EMR [employer] tab.     SOCIAL HX: nonsmoker, no alcohol use, no rec drug use, no heavy drinking    ROS: positive for sciatica. Specifically negative for bowel/bladder dysfunction, balance  "changes, headache, dizziness, foot drop, fevers, chills, appetite changes, nausea/vomiting, unexplained weight loss. Otherwise 13 systems reviewed are negative. Please see the patient's intake questionnaire from today for details.    OBJECTIVE:  BP (!) 193/85   Pulse 59   Ht 5' 5.5\" (1.664 m)   Wt 159 lb (72.1 kg)   BMI 26.06 kg/m      PHYSICAL EXAMINATION:    --CONSTITUTIONAL:  Vital signs as above.  No acute distress.  The patient is well nourished and well groomed.  --PSYCHIATRIC:  Appropriate mood and affect. The patient is awake, alert, oriented to person, place, time and answering questions appropriately with clear speech.    --SKIN:  Skin over the face, bilateral lower extremities, and posterior torso is clean, dry, intact without rashes.    --RESPIRATORY: Normal rhythm and effort. No abnormal accessory muscle breathing patterns noted.   --ABDOMINAL:  Non-distended.    --GROSS MOTOR: Gait is non-antalgic. Easily arises from a seated position. Toe walking and heel walking are normal however pain is exacerbated with heel walking      --LOWER EXTREMITY MOTOR TESTING:  Hip flexion: right 5/5, left 5/5  Hip abduction: right 5/5, left 5/5  Hip adduction: right 5/5, left 5/5   Quads: right 5/5, left 5/5  Hamstrings: right 5/5, left 5/5  Dorsiflexion: right 5/5, left 5/5  Plantar flexion: right 5/5, left 5/5    Great toe MTP extension/EHL: right 5/5, left 5/5    --NEUROLOGICAL:  2/4 patellar and achilles reflexes bilaterally. Sensation to light touch is intact throughout both lower extremities. Babinski is negative. No clonus.    --MUSCULOSKELETAL: Lumbar spine inspection reveals no evidence of deformity. Range of motion is  limited in lumbar flexion, extension, lateral rotation due to pain. No point tenderness to palpation lumbar spine. Bilateral lumbar paraspinal musculature tenderness.     Straight leg raising is positive on the right    --HIPS: Full range of motion bilaterally. Negative HAMILTON and Negative " FADIR bilaterally. Mild R hip joint tenderness to palp.    --SACROILIAC JOINT: Gaenslen's Test was positive on right. Thigh thrust was negative. Sacroiliac Joint Compression Test was positive on right. One finger point test was negative. Positive right SI joint tenderness    --VASCULAR:  Bilateral lower extremities are warm without any discoloration.  There is no pitting edema of the bilateral lower extremities.    RESULTS:   Prior medical records from [facility] and Care Everywhere were reviewed today.    Imaging: Spine imaging was personally reviewed and interpreted today. The images were shown to the patient and the findings were explained using a spine model.    EXAM: XR LUMBAR SPINE 2/3 VIEWS  LOCATION: [facility]  DATE/TIME: 3/10/2023 9:47 AM     INDICATION: low back pain after fall 2 weeks ago  COMPARISON: Lumbar spine radiograph 11/03/2020                                                                      IMPRESSION: There are 5 lumbar type vertebral bodies. There is mild levoconvex curvature of the lumbar spine. No significant loss of disc height. Unchanged mild to moderate facet arthropathy at the lumbosacral junction. No acute fracture.        Deborah Farley FNP-C  [facility]  O. [phone]

## 2024-02-06 NOTE — PATIENT INSTRUCTIONS
Flexeril 10mg (muscle relaxant medication) has been prescribed today. Please take this three times daily as needed for muscle spasms. This medication may cause drowsiness. Please do not work or drive while taking this medication until you know how it affects you. If it does make you drowsy, you should only take it before bedtime or at times that you do not have to work/drive.   Continue your naproxen twice daily for another 1-2 days to see if any additional relief.  You can take tylenol over the counter as directed as needed as well.    Imaging (MRI) has been ordered today.   Radiology will call you to schedule. Please call below if you do not hear from them in the next couple of days.     [facility] Radiology Scheduling:  Please call [phone] to schedule your image(s) (select option #1).    There are 3 different locations:    [facility]  [address] [place] [address] Imaging - [place]  [address]   [place] [id number]       Radicular Pain    Radicular pain in either the arm or leg is usually from a bulging disc in the spine. A portion of the herniated disc may press against the nerves as the nerves exit the spine. This causes pain which is felt down the arm or leg. Other causes of radicular pain may include:  Fractures.  Heart disease.  Cancer.  An abnormal and usually degenerative state of the nervous system or nerves (neuropathy).    In most cases, radicular pain is treated without imaging unless symptoms do not start to improve. If that is the case, your provider may order a CT or MRI scan to determine the cause.     Nerves in the cervical spine (neck) may cause radicular pain into the outer shoulder and down the arm. It can spread down to the thumb and fingers. The symptoms vary depending on which nerve root has been affected. In most cases, radicular pain improves  "with conservative treatment such as physical therapy, cervical traction, medications, and epidural steroid injections. A program for neck rehabilitation with stretching and strengthening exercises is an important part of management. Treatment may take months, and surgery may be considered as a last resort if the symptoms do not improve.    Nerves in the lumbar spine (lower back) may cause radicular pain into the hip and down the leg. The commonly used term for this type of pain is \"sciatica\". Conservative treatment is also recommended for this problem. Most patients feel better after 2 to 4 weeks of rest and other supportive care. You should avoid bending, lifting, and all other activities which can make your pain worse. Physical therapy, traction, medications, and epidural steroid injections can be good options to help with your recovery. A program for back injury rehabilitation with stretching and strengthening exercises is an important part of management. Surgery may be considered as a last resort if symptoms do not improve with conservative treatment.     You may take over-the-counter or prescription medicines for pain, discomfort, or fever as directed by your caregiver. Muscle relaxants may help by relieving more severe pain and spasm. Neuropathic medication (such as gabapentin, lyrica, or cymbalta) can help decrease your symptoms of nerve pain as well. Cold or massage can also give significant relief. Spinal manipulation is not recommended as it can increase the degree of disc protrusion. We do not recommend taking narcotic medication such as percocet, oxycodone, norco, dilaudid, or others unless pain is severe and not controlled with any other oral options.    Epidural steroid injections are often effective treatment for radicular pain. These injections deliver strong anti-inflammatory medicine to the area directly around the nerve root in the space between your back bones (vertebrae). Your provider can give " you more information about steroid injections. These injections are most effective when given within two weeks of the onset of acute pain. You should see your provider for follow up care as recommended.     In most cases, radicular pain is treated without imaging unless symptoms do not start to improve. If that is the case, your provider may order a CT or MRI scan to determine the cause.     SEEK IMMEDIATE MEDICAL CARE IF:  You develop increased pain, weakness, or numbness in your arm or leg.  You develop difficulty with bladder or bowel control.  You develop abdominal pain.    Document Released: 01/25/2006 Document Revised: 03/11/2013 Document Reviewed: 04/12/2010  Patient Information  2013 [employer].           ~Please call our [facility] Nurse Navigation line [phone] with any questions or concerns about your treatment plan, if symptoms worsen and you would like to be seen urgently, or if you have any new or worsening numbness, weakness, or problems controlling bladder and bowel function.  ~You are also welcome to contact Deborah Farley via GoodRx, but please be aware that responses to GoodRx message may take 2-3 days due to the high volume of patients seen in clinic.

## 2024-02-08 ENCOUNTER — TELEPHONE (OUTPATIENT)
Dept: PHYSICAL MEDICINE AND REHAB | Facility: CLINIC | Age: 64
End: 2024-02-08
Payer: COMMERCIAL

## 2024-02-08 DIAGNOSIS — M54.41 ACUTE RIGHT-SIDED LOW BACK PAIN WITH RIGHT-SIDED SCIATICA: Primary | ICD-10-CM

## 2024-02-08 RX ORDER — METHYLPREDNISOLONE 4 MG
TABLET, DOSE PACK ORAL
Qty: 21 TABLET | Refills: 0 | Status: SHIPPED | OUTPATIENT
Start: 2024-02-08 | End: 2024-02-29

## 2024-02-08 NOTE — TELEPHONE ENCOUNTER
Patient and son calling back to get an update on medications that has been prescribed. Would like to know how much to take, per patient and son what is on the prescription bottle is different from what Deborah told patient to take.   Please contact patient /son back to clarify,   Thank you.

## 2024-02-08 NOTE — TELEPHONE ENCOUNTER
Phone call to patient to inform. Spoke with both patient and his son Anil. Instructed to avoid OTC NSAIDs such as Ibuprofen, Advil, Motrin, Aleve, or Naproxen while taking the prescription. He may take 2 ES Tylenol every 8 hours along with the medrol dose pack and the muscle relaxant.     Encouraged pt to call nurse navigation line if questions or concerns arise. Direct number provided. Stated understanding and appreciation for call back.

## 2024-02-08 NOTE — TELEPHONE ENCOUNTER
Who's calling: Patient Son (bhavin) with patient on the line  (Patient/ family calling on behalf of the patient)  On C2C: Yes  (Family/friends calling are they on patient Consent to Communicate form)   Reason for call: Patient son is calling in request for a c/b from care team regarding worsening pain, and that medication is not helping. Please advise  (Keep it simple but as detailed as to why the patient/family is calling)  Preferred Pharmacy: n/a  Call back #: [phone]  Providers name: Deborah Fraley

## 2024-02-08 NOTE — TELEPHONE ENCOUNTER
[facility]    Phone Message    May a detailed message be left on voicemail: yes     Reason for Call: Pt is calling with a progress check in, the medicine he taken and addition to the cyclobenzaprine the pain has went up,also schedule a imaging appt. Wants a call back asap      Action Taken: Other: [facility]    Travel Screening: Not Applicable

## 2024-02-09 NOTE — TELEPHONE ENCOUNTER
Called and spoke with patient. He states he did get clarification from the pharmacist. He has no further questions at this time.

## 2024-02-17 ENCOUNTER — HOSPITAL ENCOUNTER (OUTPATIENT)
Dept: MRI IMAGING | Facility: CLINIC | Age: 64
Discharge: HOME | End: 2024-02-17
Attending: NURSE PRACTITIONER | Admitting: NURSE PRACTITIONER
Payer: COMMERCIAL

## 2024-02-17 DIAGNOSIS — M54.41 ACUTE RIGHT-SIDED LOW BACK PAIN WITH RIGHT-SIDED SCIATICA: ICD-10-CM

## 2024-02-17 PROCEDURE — 72148 MRI LUMBAR SPINE W/O DYE: CPT

## 2024-02-19 ENCOUNTER — TELEPHONE (OUTPATIENT)
Dept: PHYSICAL MEDICINE AND REHAB | Facility: CLINIC | Age: 64
End: 2024-02-19
Payer: COMMERCIAL

## 2024-02-19 NOTE — TELEPHONE ENCOUNTER
Phone call to patient to review results and provider's recommendations. Left message to return call.     Per schedule, patient does have a follow up with [facility] tomorrow to review MRI results and discuss pain management.

## 2024-02-19 NOTE — TELEPHONE ENCOUNTER
----- Message from CHESTER Turner CNP sent at 2/19/2024 10:54 AM CST -----  Please let Callum know that his lumbar MRI is showing disc bulges at L3-4 and L4-5 which are contacting the nerve roots on the right at both levels. If his right leg pain is still severe, I would recommend a Right L3-4 and L4-5 TF YANIRA .

## 2024-02-20 ENCOUNTER — OFFICE VISIT (OUTPATIENT)
Dept: PHYSICAL MEDICINE AND REHAB | Facility: CLINIC | Age: 64
End: 2024-02-20
Payer: COMMERCIAL

## 2024-02-20 VITALS — SYSTOLIC BLOOD PRESSURE: 135 MMHG | HEART RATE: 76 BPM | DIASTOLIC BLOOD PRESSURE: 72 MMHG

## 2024-02-20 DIAGNOSIS — M54.41 ACUTE RIGHT-SIDED LOW BACK PAIN WITH RIGHT-SIDED SCIATICA: Primary | ICD-10-CM

## 2024-02-20 DIAGNOSIS — M46.1 SACROILIITIS (H): ICD-10-CM

## 2024-02-20 PROCEDURE — 99213 OFFICE O/P EST LOW 20 MIN: CPT | Performed by: NURSE PRACTITIONER

## 2024-02-20 RX ORDER — IBUPROFEN 800 MG/1
800 TABLET, FILM COATED ORAL EVERY 8 HOURS PRN
Qty: 60 TABLET | Refills: 0 | Status: SHIPPED | OUTPATIENT
Start: 2024-02-20 | End: 2024-03-21

## 2024-02-20 NOTE — PROGRESS NOTES
ASSESSMENT: Callum Haywood is a 63 year old male who presents for consultation at the request of PCP Stephie Lewis, who presents today for a follow up patient evaluation of:    -lumbar radiculopathy    Patient is neurologically intact on exam. No myelopathic or red flag symptoms. Symptoms are overall improving.   We reviewed his lumbar MRI, which shows disc bulges at L3-4, L4-5, and L5-S1. There is R sided contacting of the L3 and L4 nerve roots.  There is moderate canal stenosis at L4-5 with severe lateral recess stenosis bilaterally.  his moderate bilateral foraminal stenosis at L5-S1.  His right leg pain is consistent with lumbar radiculopathy.   He may also have some degree of SI related pain given that he had multiple positive SI maneuvers today with tenderness to SI area on exam.  I recommended starting physical therapy at this point and continuing NSAIDs and Flexeril as needed.  I sent ibuprofen 800 mg to his pharmacy today.  recommend continuing current restrictions at work, however If he continues to improve, I would not have any problem with him returning to work as planned  without restriction on 3/12.  If symptoms persist or worsen with physical therapy, we talked about options including lumbar epidural steroid injection for pain control.  We will defer for now given he is improving    He can continue flexeril - does not need refill today         11/11/2020     8:00 AM   OSWESTRY DISABILITY INDEX   Count 8   Sum 7   Oswestry Score (%) 17.5 %            Diagnoses and all orders for this visit:  Acute right-sided low back pain with right-sided sciatica  -     ibuprofen (ADVIL/MOTRIN) 800 MG tablet; Take 1 tablet (800 mg) by mouth every 8 hours as needed for inflammatory pain  -     Physical Therapy Referral; Future  Sacroiliitis (H24)  -     Physical Therapy Referral; Future        PLAN:  Reviewed spine anatomy and disease process. Discussed diagnosis and treatment options with the patient  today. A shared decision making model was used.  The patient's values and choices were respected. The following represents what was discussed and decided upon by the provider and the patient.      -DIAGNOSTIC TESTS:  Images were personally reviewed and interpreted and explained to patient today using a spine model.   --I did not order any new diagnostic tests today     -PHYSICAL THERAPY:    -- referral placed for physical therapy today   discussed the importance of core strengthening, ROM, stretching exercises with the patient and how each of these entities is important in decreasing pain.  Explained to the patient that the purpose of physical therapy is to teach the patient a home exercise program.  These exercises need to be performed every day in order to decrease pain and prevent future occurrences of pain.        -MEDICATIONS:    -prescribed ibuprofen 800mg  p.o. 3 times daily as needed today  -continue flexeril 10mg TID PRN (  Does not need refill)  -tylenol otc as directed prn (patient states he will take if needed but wont plan on it)  -consider gabapentin   Discussed multiple medication options today with patient. Discussed risks, side effects, and proper use of medications. Patient verbalized understanding.    -INTERVENTIONS:    Discussed the role of injections with patient today. Patient would be a good candidate in the future for either epidural steroid injections, MBB, or SI injections  based on his response to physical therapy and medications    -PATIENT EDUCATION:  Total time of 20 minutes, on the day of service, spent with the patient, reviewing the chart, placing orders, and documenting.   -Today we also discussed the pros and cons of the current treatment plan.    -FOLLOW-UP:   follow up in person to review lumbar MRI     Advised patient to call the Spine Center if symptoms worsen, new numbness or weakness develops in the legs, or if they develop new or worsening problems controlling bladder or  bowel function.   ______________________________________________________________________    SUBJECTIVE:     patient is back today to review his lumbar MRI study.  His symptoms are overall better.  his right gluteal region pain and his right calf pain is now gone.  He is still having pain in his right posterior thigh however. His pain is a 5 out of 10 today.  At worst it is an 8 out of 10.  It is worse with sitting down, speed walking and running.   it improves with walking at a slow speed and with standing. He felt like the Medrol Dosepak helped quite a bit.  He has been off of any medication for 6 days.  Stop taking the Flexeril when his Medrol Dosepak ended.  He has been doing stretching on his own using steam.  He did try to go to the gym  while on the Medrol Dosepak which temporarily made his symptoms worse, but this improved  with finishing the rest of the medication.   He has not gone back to the gym.  He gave his employer a letter with physical restrictions and unfortunately they have no positions that fit those restrictions which he could work so he has not returned.  He is hopeful he will be able to return tentative date March 12.      He is here with his son today who translates per his request      Per original visit  HPI:  Callum Haywood  Is a 63 year old male hx GERD, IBS who presents today for new patient evaluation of lumbar radiculopathy     Patient's symptoms began suddenly in January while standing. Originally had pain in the left low back and left gluteal region. He was seen at urgent care on 1/20 and treated with prednisone and flexeril which really helped.     Then, 4 days ago, he woke up with R lower pain and R gluteal region, right hip, and in the posterior thigh and calf. The pain was worse with sitting to a 9/10. Improves to a 5/10 with standing and walking. Whenever he went to drive,  he would have to sit on his left butt cheek. Describes the pain as an electric shock. The next day,  he went to the gym which he admits was a bad idea. The following day, the pain was so bad he could not drive and he went to see his PCP.     Today the pain is a 6/10. It is a 9/10 at worst, 6/10 at best. The pain is worse with sitting. Improves with standing and walking. He was seen by his pcp on 2/4 and given a toradol injection which really helped and took tylenol as well. Yesterday he went to the pharmacy and picked up the naproxen 550mg twice daily. He has not noticed a significant difference yet. Today he has new pain in the right calf. He has tingling in the same distribution.      He denies any leg weakness, or changes in bowel or bladder control.    He has not done any PT. He has been doing exercises from a prior PT course years ago on a daily basis without significant relief. He is an avid exerciser, runs daily, does exercises with weights. 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    He has had previous injections but states he does not recall any relief after these.  L5-S1 translaminar epidural steroid injection July 2020  L4-5 translaminar epidural steroid injection may 2020    He had some lumbar xr taken in March of 2023      -Treatment to Date:     -Medications:  Prednisone  Flexeril 10mg  Naproxen 550mg  Medrol Dosepak    Current Outpatient Medications   Medication    cholecalciferol (VITAMIN D) 1000 UNIT tablet    cyclobenzaprine (FLEXERIL) 10 MG tablet    cyclobenzaprine (FLEXERIL) 10 MG tablet    ibuprofen (ADVIL/MOTRIN) 800 MG tablet    methylPREDNISolone (MEDROL DOSEPAK) 4 MG tablet therapy pack     No current facility-administered medications for this visit.       Allergies   Allergen Reactions    No Known Allergies     Seasonal Allergies        Past Medical History:   Diagnosis Date    DDD (degenerative disc disease), lumbar     Esophageal reflux     Helicobacter pylori (H. pylori)     Irritable bowel syndrome     Low back pain     Other  specified psychosexual disorder     Pure hypercholesterolemia         Patient Active Problem List   Diagnosis    GERD    IBS    PSYCHOSEXUAL DISORDER    HYPERLIPIDEMIA LDL GOAL <160    Posttraumatic stress disorder    Health Care Home    Chronic bilateral low back pain with bilateral sciatica    Diabetes mellitus, type 2 (H)       Past Surgical History:   Procedure Laterality Date    COLONOSCOPY N/A 5/12/2023    Procedure: COLONOSCOPY, WITH POLYPECTOMY;  Surgeon: Mando Bowen MD;  Location: [facility] OR    NO HISTORY OF SURGERY         Family History   Problem Relation Age of Onset    C.A.D. No family hx of     Diabetes No family hx of     Hypertension No family hx of     Cerebrovascular Disease No family hx of     Breast Cancer No family hx of     Cancer - colorectal No family hx of     Prostate Cancer No family hx of     Alcohol/Drug No family hx of     Allergies No family hx of     Alzheimer Disease No family hx of        Reviewed past medical, surgical, and family history with patient found on new patient intake packet located in EMR Media tab.     SOCIAL HX: nonsmoker, no alcohol use, no rec drug use, no heavy drinking    ROS: positive for sciatica. Specifically negative for bowel/bladder dysfunction, balance changes, headache, dizziness, foot drop, fevers, chills, appetite changes, nausea/vomiting, unexplained weight loss. Otherwise 13 systems reviewed are negative. Please see the patient's intake questionnaire from today for details.    OBJECTIVE:  /72   Pulse 76     PHYSICAL EXAMINATION:    --CONSTITUTIONAL:  Vital signs as above.  No acute distress.  The patient is well nourished and well groomed.  --PSYCHIATRIC:  Appropriate mood and affect. The patient is awake, alert, oriented to person, place, time and answering questions appropriately with clear speech.    --SKIN:  Skin over the face, bilateral lower extremities, and posterior torso is clean, dry, intact without rashes.    --RESPIRATORY: Normal  rhythm and effort. No abnormal accessory muscle breathing patterns noted.   --ABDOMINAL:  Non-distended.    --GROSS MOTOR: Gait is non-antalgic. Easily arises from a seated position. Toe walking and heel walking are normal however pain is exacerbated with heel walking      --LOWER EXTREMITY MOTOR TESTING:  Hip flexion: right 5/5, left 5/5  Hip abduction: right 5/5, left 5/5  Hip adduction: right 5/5, left 5/5   Quads: right 5/5, left 5/5  Hamstrings: right 5/5, left 5/5  Dorsiflexion: right 5/5, left 5/5  Plantar flexion: right 5/5, left 5/5    Great toe MTP extension/EHL: right 5/5, left 5/5    --NEUROLOGICAL:  2/4 patellar and achilles reflexes bilaterally. Sensation to light touch is intact throughout both lower extremities. Babinski is negative. No clonus.    --MUSCULOSKELETAL: Lumbar spine inspection reveals no evidence of deformity. Range of motion is  limited in lumbar flexion, extension, lateral rotation due to pain. No point tenderness to palpation lumbar spine. Bilateral lumbar paraspinal musculature tenderness.     Straight leg raising is positive on the right    --HIPS: Full range of motion bilaterally. Negative HAMILTON and Negative FADIR bilaterally. Mild R hip joint tenderness to palp.    --SACROILIAC JOINT: Gaenslen's Test was positive on right. Thigh thrust was negative. Sacroiliac Joint Compression Test was positive on right. One finger point test was negative. Positive right SI joint tenderness    --VASCULAR:  Bilateral lower extremities are warm without any discoloration.  There is no pitting edema of the bilateral lower extremities.    RESULTS:   Prior medical records from [facility] and Care Everywhere were reviewed today.    Imaging: Spine imaging was personally reviewed and interpreted today. The images were shown to the patient and the findings were explained using a spine model.    Narrative & Impression   EXAM: MR LUMBAR SPINE W/O CONTRAST  LOCATION: [facility]  HOSPITAL  DATE: 2/17/2024     INDICATION:  Lumbar radiculopathy, right posterior leg pain and tingling. treatment planning  COMPARISON: Lumbar spine MRI 01/30/2020  TECHNIQUE: Routine Lumbar Spine MRI without IV contrast.     FINDINGS:   Transitional anatomy with partial lumbarization of S1 and incompletely formed disc space at S1-S2. L5-S1 is defined on image 10 of series 6.  Straightening of the usual lumbar lordosis. No spondylolisthesis. Vertebral body heights maintained.  Canal   narrowing on a developmental basis. Few levels of degenerative fatty marrow endplate changes (Modic type II). Otherwise, no  suspicious marrow signal abnormality. Conus medullaris at L2. Incidental fatty filum.     Prevertebral soft tissues are unremarkable. Mild dorsal paraspinal muscular atrophy. Visualized intra-abdominal structures are unremarkable.     T12-L1: Unremarkable appearance of the disc. No significant facet arthropathy. No significant canal or foraminal stenosis.      L1-L2: Minimal bulge. No significant disc height loss. Mild bilateral facet arthropathy. No significant canal or foraminal stenosis.     L2-L3: No significant disc herniation or disc height loss. Mild to moderate bilateral facet arthropathy. No significant canal or foraminal stenosis.      L3-L4: Diffuse bulge with contact of the exiting L3 nerve roots at the foramen. Mild disc height loss. Mild to moderate bilateral facet arthropathy with ligamentum flavum hypertrophy. No significant canal or foraminal stenosis.     L4-L5: Diffuse bulge with contact of the exiting right L4 nerve root at the foramen. Mild disc height loss. Mild to moderate bilateral facet arthropathy with ligamentum flavum hypertrophy. Moderate canal stenosis. Severe lateral recess stenosis   bilaterally. No significant foraminal stenosis.     L5-S1: Diffuse bulge with posterior disc and osteophyte. Mild disc height loss. Mild bilateral facet arthropathy with ligamentum flavum hypertrophy.  No significant canal stenosis. Moderate bilateral foraminal stenosis.                                                                      IMPRESSION:  1.  Multilevel lumbar degenerative changes superimposed on developmental canal narrowing, not significantly changed since 2020.  2.  Unchanged moderate canal stenosis and severe lateral recess stenosis bilaterally at L4-L5.  3.  Contact of the bilateral exiting L3 and right L4 nerve roots at the foramen.       EXAM: XR LUMBAR SPINE 2/3 VIEWS  LOCATION: [facility]  DATE/TIME: 3/10/2023 9:47 AM     INDICATION: low back pain after fall 2 weeks ago  COMPARISON: Lumbar spine radiograph 11/03/2020                                                                      IMPRESSION: There are 5 lumbar type vertebral bodies. There is mild levoconvex curvature of the lumbar spine. No significant loss of disc height. Unchanged mild to moderate facet arthropathy at the lumbosacral junction. No acute fracture.        Deborah Farley FNP-C  [facility]  O. [phone]

## 2024-02-20 NOTE — LETTER
2/20/2024         RE: Callum Haywood  [address]  [place] [address]        Dear Colleague,    Thank you for referring your patient, Callum Haywood, to the [facility]. Please see a copy of my visit note below.    ASSESSMENT: Callum Haywood is a 63 year old male who presents for consultation at the request of PCP Stephie Lewis, who presents today for a follow up patient evaluation of:    -lumbar radiculopathy    Patient is neurologically intact on exam. No myelopathic or red flag symptoms. Symptoms are overall improving.   We reviewed his lumbar MRI, which shows disc bulges at L3-4, L4-5, and L5-S1. There is R sided contacting of the L3 and L4 nerve roots.  There is moderate canal stenosis at L4-5 with severe lateral recess stenosis bilaterally.  his moderate bilateral foraminal stenosis at L5-S1.  His right leg pain is consistent with lumbar radiculopathy.   He may also have some degree of SI related pain given that he had multiple positive SI maneuvers today with tenderness to SI area on exam.  I recommended starting physical therapy at this point and continuing NSAIDs and Flexeril as needed.  I sent ibuprofen 800 mg to his pharmacy today.  recommend continuing current restrictions at work, however If he continues to improve, I would not have any problem with him returning to work as planned  without restriction on 3/12.  If symptoms persist or worsen with physical therapy, we talked about options including lumbar epidural steroid injection for pain control.  We will defer for now given he is improving    He can continue flexeril - does not need refill today         11/11/2020     8:00 AM   OSWESTRY DISABILITY INDEX   Count 8   Sum 7   Oswestry Score (%) 17.5 %            Diagnoses and all orders for this visit:  Acute right-sided low back pain with right-sided sciatica  -     ibuprofen (ADVIL/MOTRIN) 800 MG tablet; Take 1 tablet (800 mg) by  mouth every 8 hours as needed for inflammatory pain  -     Physical Therapy Referral; Future  Sacroiliitis (H24)  -     Physical Therapy Referral; Future        PLAN:  Reviewed spine anatomy and disease process. Discussed diagnosis and treatment options with the patient today. A shared decision making model was used.  The patient's values and choices were respected. The following represents what was discussed and decided upon by the provider and the patient.      -DIAGNOSTIC TESTS:  Images were personally reviewed and interpreted and explained to patient today using a spine model.   --I did not order any new diagnostic tests today     -PHYSICAL THERAPY:    -- referral placed for physical therapy today   discussed the importance of core strengthening, ROM, stretching exercises with the patient and how each of these entities is important in decreasing pain.  Explained to the patient that the purpose of physical therapy is to teach the patient a home exercise program.  These exercises need to be performed every day in order to decrease pain and prevent future occurrences of pain.        -MEDICATIONS:    -prescribed ibuprofen 800mg  p.o. 3 times daily as needed today  -continue flexeril 10mg TID PRN (  Does not need refill)  -tylenol otc as directed prn (patient states he will take if needed but wont plan on it)  -consider gabapentin   Discussed multiple medication options today with patient. Discussed risks, side effects, and proper use of medications. Patient verbalized understanding.    -INTERVENTIONS:    Discussed the role of injections with patient today. Patient would be a good candidate in the future for either epidural steroid injections, MBB, or SI injections  based on his response to physical therapy and medications    -PATIENT EDUCATION:  Total time of 20 minutes, on the day of service, spent with the patient, reviewing the chart, placing orders, and documenting.   -Today we also discussed the pros and cons  of the current treatment plan.    -FOLLOW-UP:   follow up in person to review lumbar MRI     Advised patient to call the Spine Center if symptoms worsen, new numbness or weakness develops in the legs, or if they develop new or worsening problems controlling bladder or bowel function.   ______________________________________________________________________    SUBJECTIVE:     patient is back today to review his lumbar MRI study.  His symptoms are overall better.  his right gluteal region pain and his right calf pain is now gone.  He is still having pain in his right posterior thigh however. His pain is a 5 out of 10 today.  At worst it is an 8 out of 10.  It is worse with sitting down, speed walking and running.   it improves with walking at a slow speed and with standing. He felt like the Medrol Dosepak helped quite a bit.  He has been off of any medication for 6 days.  Stop taking the Flexeril when his Medrol Dosepak ended.  He has been doing stretching on his own using steam.  He did try to go to the gym  while on the Medrol Dosepak which temporarily made his symptoms worse, but this improved  with finishing the rest of the medication.   He has not gone back to the gym.  He gave his employer a letter with physical restrictions and unfortunately they have no positions that fit those restrictions which he could work so he has not returned.  He is hopeful he will be able to return tentative date March 12.      He is here with his son today who translates per his request      Per original visit  HPI:  Callum Haywood  Is a 63 year old male hx GERD, IBS who presents today for new patient evaluation of lumbar radiculopathy     Patient's symptoms began suddenly in January while standing. Originally had pain in the left low back and left gluteal region. He was seen at urgent care on 1/20 and treated with prednisone and flexeril which really helped.     Then, 4 days ago, he woke up with R lower pain and R gluteal  region, right hip, and in the posterior thigh and calf. The pain was worse with sitting to a 9/10. Improves to a 5/10 with standing and walking. Whenever he went to drive,  he would have to sit on his left butt cheek. Describes the pain as an electric shock. The next day, he went to the gym which he admits was a bad idea. The following day, the pain was so bad he could not drive and he went to see his PCP.     Today the pain is a 6/10. It is a 9/10 at worst, 6/10 at best. The pain is worse with sitting. Improves with standing and walking. He was seen by his pcp on 2/4 and given a toradol injection which really helped and took tylenol as well. Yesterday he went to the pharmacy and picked up the naproxen 550mg twice daily. He has not noticed a significant difference yet. Today he has new pain in the right calf. He has tingling in the same distribution.      He denies any leg weakness, or changes in bowel or bladder control.    He has not done any PT. He has been doing exercises from a prior PT course years ago on a daily basis without significant relief. He is an avid exerciser, runs daily, does exercises with weights. 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    He has had previous injections but states he does not recall any relief after these.  L5-S1 translaminar epidural steroid injection July 2020  L4-5 translaminar epidural steroid injection may 2020    He had some lumbar xr taken in March of 2023      -Treatment to Date:     -Medications:  Prednisone  Flexeril 10mg  Naproxen 550mg  Medrol Dosepak    Current Outpatient Medications   Medication     cholecalciferol (VITAMIN D) 1000 UNIT tablet     cyclobenzaprine (FLEXERIL) 10 MG tablet     cyclobenzaprine (FLEXERIL) 10 MG tablet     ibuprofen (ADVIL/MOTRIN) 800 MG tablet     methylPREDNISolone (MEDROL DOSEPAK) 4 MG tablet therapy pack     No current facility-administered medications for this visit.        Allergies   Allergen Reactions     No Known Allergies      Seasonal Allergies        Past Medical History:   Diagnosis Date     DDD (degenerative disc disease), lumbar      Esophageal reflux      Helicobacter pylori (H. pylori)      Irritable bowel syndrome      Low back pain      Other specified psychosexual disorder      Pure hypercholesterolemia         Patient Active Problem List   Diagnosis     GERD     IBS     PSYCHOSEXUAL DISORDER     HYPERLIPIDEMIA LDL GOAL <160     Posttraumatic stress disorder     Health Care Home     Chronic bilateral low back pain with bilateral sciatica     Diabetes mellitus, type 2 (H)       Past Surgical History:   Procedure Laterality Date     COLONOSCOPY N/A 5/12/2023    Procedure: COLONOSCOPY, WITH POLYPECTOMY;  Surgeon: Mando Bowen MD;  Location: [facility] OR     NO HISTORY OF SURGERY         Family History   Problem Relation Age of Onset     C.A.D. No family hx of      Diabetes No family hx of      Hypertension No family hx of      Cerebrovascular Disease No family hx of      Breast Cancer No family hx of      Cancer - colorectal No family hx of      Prostate Cancer No family hx of      Alcohol/Drug No family hx of      Allergies No family hx of      Alzheimer Disease No family hx of        Reviewed past medical, surgical, and family history with patient found on new patient intake packet located in [employer] tab.     SOCIAL HX: nonsmoker, no alcohol use, no rec drug use, no heavy drinking    ROS: positive for sciatica. Specifically negative for bowel/bladder dysfunction, balance changes, headache, dizziness, foot drop, fevers, chills, appetite changes, nausea/vomiting, unexplained weight loss. Otherwise 13 systems reviewed are negative. Please see the patient's intake questionnaire from today for details.    OBJECTIVE:  /72   Pulse 76     PHYSICAL EXAMINATION:    --CONSTITUTIONAL:  Vital signs as above.  No acute distress.  The patient is well nourished and well  groomed.  --PSYCHIATRIC:  Appropriate mood and affect. The patient is awake, alert, oriented to person, place, time and answering questions appropriately with clear speech.    --SKIN:  Skin over the face, bilateral lower extremities, and posterior torso is clean, dry, intact without rashes.    --RESPIRATORY: Normal rhythm and effort. No abnormal accessory muscle breathing patterns noted.   --ABDOMINAL:  Non-distended.    --GROSS MOTOR: Gait is non-antalgic. Easily arises from a seated position. Toe walking and heel walking are normal however pain is exacerbated with heel walking      --LOWER EXTREMITY MOTOR TESTING:  Hip flexion: right 5/5, left 5/5  Hip abduction: right 5/5, left 5/5  Hip adduction: right 5/5, left 5/5   Quads: right 5/5, left 5/5  Hamstrings: right 5/5, left 5/5  Dorsiflexion: right 5/5, left 5/5  Plantar flexion: right 5/5, left 5/5    Great toe MTP extension/EHL: right 5/5, left 5/5    --NEUROLOGICAL:  2/4 patellar and achilles reflexes bilaterally. Sensation to light touch is intact throughout both lower extremities. Babinski is negative. No clonus.    --MUSCULOSKELETAL: Lumbar spine inspection reveals no evidence of deformity. Range of motion is  limited in lumbar flexion, extension, lateral rotation due to pain. No point tenderness to palpation lumbar spine. Bilateral lumbar paraspinal musculature tenderness.     Straight leg raising is positive on the right    --HIPS: Full range of motion bilaterally. Negative HAMILTON and Negative FADIR bilaterally. Mild R hip joint tenderness to palp.    --SACROILIAC JOINT: Gaenslen's Test was positive on right. Thigh thrust was negative. Sacroiliac Joint Compression Test was positive on right. One finger point test was negative. Positive right SI joint tenderness    --VASCULAR:  Bilateral lower extremities are warm without any discoloration.  There is no pitting edema of the bilateral lower extremities.    RESULTS:   Prior medical records from  [facility] were reviewed today.    Imaging: Spine imaging was personally reviewed and interpreted today. The images were shown to the patient and the findings were explained using a spine model.    Narrative & Impression   EXAM: MR LUMBAR SPINE W/O CONTRAST  LOCATION: [facility]  DATE: 2/17/2024     INDICATION:  Lumbar radiculopathy, right posterior leg pain and tingling. treatment planning  COMPARISON: Lumbar spine MRI 01/30/2020  TECHNIQUE: Routine Lumbar Spine MRI without IV contrast.     FINDINGS:   Transitional anatomy with partial lumbarization of S1 and incompletely formed disc space at S1-S2. L5-S1 is defined on image 10 of series 6.  Straightening of the usual lumbar lordosis. No spondylolisthesis. Vertebral body heights maintained.  Canal   narrowing on a developmental basis. Few levels of degenerative fatty marrow endplate changes (Modic type II). Otherwise, no  suspicious marrow signal abnormality. Conus medullaris at L2. Incidental fatty filum.     Prevertebral soft tissues are unremarkable. Mild dorsal paraspinal muscular atrophy. Visualized intra-abdominal structures are unremarkable.     T12-L1: Unremarkable appearance of the disc. No significant facet arthropathy. No significant canal or foraminal stenosis.      L1-L2: Minimal bulge. No significant disc height loss. Mild bilateral facet arthropathy. No significant canal or foraminal stenosis.     L2-L3: No significant disc herniation or disc height loss. Mild to moderate bilateral facet arthropathy. No significant canal or foraminal stenosis.      L3-L4: Diffuse bulge with contact of the exiting L3 nerve roots at the foramen. Mild disc height loss. Mild to moderate bilateral facet arthropathy with ligamentum flavum hypertrophy. No significant canal or foraminal stenosis.     L4-L5: Diffuse bulge with contact of the exiting right L4 nerve root at the foramen. Mild disc height loss. Mild to moderate  bilateral facet arthropathy with ligamentum flavum hypertrophy. Moderate canal stenosis. Severe lateral recess stenosis   bilaterally. No significant foraminal stenosis.     L5-S1: Diffuse bulge with posterior disc and osteophyte. Mild disc height loss. Mild bilateral facet arthropathy with ligamentum flavum hypertrophy. No significant canal stenosis. Moderate bilateral foraminal stenosis.                                                                      IMPRESSION:  1.  Multilevel lumbar degenerative changes superimposed on developmental canal narrowing, not significantly changed since 2020.  2.  Unchanged moderate canal stenosis and severe lateral recess stenosis bilaterally at L4-L5.  3.  Contact of the bilateral exiting L3 and right L4 nerve roots at the foramen.       EXAM: XR LUMBAR SPINE 2/3 VIEWS  LOCATION: [facility]  DATE/TIME: 3/10/2023 9:47 AM     INDICATION: low back pain after fall 2 weeks ago  COMPARISON: Lumbar spine radiograph 11/03/2020                                                                      IMPRESSION: There are 5 lumbar type vertebral bodies. There is mild levoconvex curvature of the lumbar spine. No significant loss of disc height. Unchanged mild to moderate facet arthropathy at the lumbosacral junction. No acute fracture.        Deborah Farley FNP-C  [facility]  O. [phone]             Again, thank you for allowing me to participate in the care of your patient.        Sincerely,        Deborah Farley, [place] CNP

## 2024-02-20 NOTE — PATIENT INSTRUCTIONS
~You have been referred for Physical Therapy to [facility]. They will call you to schedule an appointment.      Scheduling phone number is [phone] for [facility], or [place] location.  If you have not heard from the scheduling office within 2 business days, please call [phone] for ALL other locations.    Discussed the importance of core strengthening, ROM, stretching exercises and how each of these entities is important in decreasing pain and improving long term spine health.  The purpose of physical therapy is to teach you an individualized home exercise program.  These exercises need to be performed every day in order to decrease pain and prevent future occurrences of pain.           Prescribed ibuprofen 800mg today. Please take as prescribed, as needed for pain control as well as to aid in decreasing inflammation.   Take medication with a full glass of water and with food.   *Do not take Advil, Ibuprofen, Aleve, or Naproxen while taking this medication as it can cause organ failure if taken together*  This medication does have risks if taken long-term, these risks include: gastrointestinal irritation, kidney dysfunction, and cardiovascular effects.  We will check your kidney function as needed while you're on this medication.  Stop taking this medication if you have intolerable side effects or blood in the stool.       ~Please call our [facility] Nurse Navigation line [phone] with any questions or concerns about your treatment plan, if symptoms worsen and you would like to be seen urgently, or if you have any new or worsening numbness, weakness, or problems controlling bladder and bowel function.  ~You are also welcome to contact Deborah Farley via [employer], but please be aware that responses to [employer] message may take 2-3 days due to the high volume of patients seen in clinic.      Could consider injections, lumbar epidural steroid  "injections vs SI injections depending on symptoms and response to PT      We will fill out your work paperwork effective through March 11 and if you need an extension please call to schedule an appt to review symptoms       Radicular Pain    Radicular pain in either the arm or leg is usually from a bulging disc in the spine. A portion of the herniated disc may press against the nerves as the nerves exit the spine. This causes pain which is felt down the arm or leg. Other causes of radicular pain may include:  Fractures.  Heart disease.  Cancer.  An abnormal and usually degenerative state of the nervous system or nerves (neuropathy).    In most cases, radicular pain is treated without imaging unless symptoms do not start to improve. If that is the case, your provider may order a CT or MRI scan to determine the cause.     Nerves in the cervical spine (neck) may cause radicular pain into the outer shoulder and down the arm. It can spread down to the thumb and fingers. The symptoms vary depending on which nerve root has been affected. In most cases, radicular pain improves with conservative treatment such as physical therapy, cervical traction, medications, and epidural steroid injections. A program for neck rehabilitation with stretching and strengthening exercises is an important part of management. Treatment may take months, and surgery may be considered as a last resort if the symptoms do not improve.    Nerves in the lumbar spine (lower back) may cause radicular pain into the hip and down the leg. The commonly used term for this type of pain is \"sciatica\". Conservative treatment is also recommended for this problem. Most patients feel better after 2 to 4 weeks of rest and other supportive care. You should avoid bending, lifting, and all other activities which can make your pain worse. Physical therapy, traction, medications, and epidural steroid injections can be good options to help with your recovery. A program " for back injury rehabilitation with stretching and strengthening exercises is an important part of management. Surgery may be considered as a last resort if symptoms do not improve with conservative treatment.     You may take over-the-counter or prescription medicines for pain, discomfort, or fever as directed by your caregiver. Muscle relaxants may help by relieving more severe pain and spasm. Neuropathic medication (such as gabapentin, lyrica, or cymbalta) can help decrease your symptoms of nerve pain as well. Cold or massage can also give significant relief. Spinal manipulation is not recommended as it can increase the degree of disc protrusion. We do not recommend taking narcotic medication such as percocet, oxycodone, norco, dilaudid, or others unless pain is severe and not controlled with any other oral options.    Epidural steroid injections are often effective treatment for radicular pain. These injections deliver strong anti-inflammatory medicine to the area directly around the nerve root in the space between your back bones (vertebrae). Your provider can give you more information about steroid injections. These injections are most effective when given within two weeks of the onset of acute pain. You should see your provider for follow up care as recommended.     In most cases, radicular pain is treated without imaging unless symptoms do not start to improve. If that is the case, your provider may order a CT or MRI scan to determine the cause.     SEEK IMMEDIATE MEDICAL CARE IF:  You develop increased pain, weakness, or numbness in your arm or leg.  You develop difficulty with bladder or bowel control.  You develop abdominal pain.    Document Released: 01/25/2006 Document Revised: 03/11/2013 Document Reviewed: 04/12/2010  Patient Information  2013 [employer].

## 2024-02-22 ENCOUNTER — THERAPY VISIT (OUTPATIENT)
Dept: PHYSICAL THERAPY | Facility: CLINIC | Age: 64
End: 2024-02-22
Attending: NURSE PRACTITIONER
Payer: COMMERCIAL

## 2024-02-22 DIAGNOSIS — M54.41 ACUTE RIGHT-SIDED LOW BACK PAIN WITH RIGHT-SIDED SCIATICA: ICD-10-CM

## 2024-02-22 DIAGNOSIS — M46.1 SACROILIITIS (H): ICD-10-CM

## 2024-02-22 PROCEDURE — 97161 PT EVAL LOW COMPLEX 20 MIN: CPT | Mod: GP | Performed by: PHYSICAL THERAPIST

## 2024-02-22 PROCEDURE — 97110 THERAPEUTIC EXERCISES: CPT | Mod: GP | Performed by: PHYSICAL THERAPIST

## 2024-02-22 NOTE — PROGRESS NOTES
PHYSICAL THERAPY EVALUATION  Type of Visit: Evaluation    See electronic medical record for Abuse and Falls Screening details.    Subjective     Pt is a 63 year old male presenting with complaints of low back/SIJ pain and RLE symptoms down to hamstring and occasionally to calf   The symptoms started 2/2/24   Prior treatments: injection without much benefit, PT  The resulting functional limitations include sitting, longer walking can increase pain, lifting.   Sleep Quality: fair  Current level of activity: loves exercise, completes some PT exercises    Goals of therapy: sitting, return to work    MRI 2/17  IMPRESSION:  1.  Multilevel lumbar degenerative changes superimposed on developmental canal narrowing, not significantly changed since 2020.  2.  Unchanged moderate canal stenosis and severe lateral recess stenosis bilaterally at L4-L5.  3.  Contact of the bilateral exiting L3 and right L4 nerve roots at the foramen.          Presenting condition or subjective complaint: i have sciatica and scoliosis - 2/2/24  Date of onset: 02/02/24    Relevant medical history:     Dates & types of surgery: No  Prior diagnostic imaging/testing results: MRI       Prior Level of Function  Transfers:   Ambulation:   ADL:   IADL:     Living Environment  Social support: With family members   Type of home: [facility]   Stairs to enter the home: No   Is there a railing: No   Ramp: No   Stairs inside the home: No       Employment: Yes    Pain assessment: Pain present     Objective     LUMBAR SPINE EVALUATION    PAIN: Pain Level at Rest: 5/10  Pain Level with Use: 7/10 with sitting but back to 5/10 with walking   Pain Location:  RLE - hamstring area  Pain Quality: Electric  Pain Frequency: constant      ROM Min, mod, max loss   Lumbar Flexion Mod - increased RLE symptoms    Lumbar Extension Min - slight reduction in RLE but minimal. Pain further reduced in prone positioning    Lumbar Side Bend     Torso Rotation (seated)        Hip ROM  screen: WFL B      MYOTOMES:    Left Right   T12-L3 (Hip Flexion) 5 4   L2-4 (Quads)  5 4   L4 (Ankle DF) 5 4   L5 (Great Toe Ext)     S1 (Toe Raise and Knee Flexion) /20 5 /20  4     Functional Core/Gluteal Strength:   -Bridging: no pain once cues for core engagement   -Squat: shift to L side with mini squat   -SLS: less stable on RLE with L hip drop. WFL on L      Dural Signs:   L R   Slump     SLR - +       Assessment & Plan   CLINICAL IMPRESSIONS  Medical Diagnosis: Acute right-sided low back pain with right-sided sciatica,  Sacroiliitis    Treatment Diagnosis: Low back pain and RLE symptoms   Impression/Assessment: Patient is a 63 year old male with low back and RLE complaints.  The following significant findings have been identified: Pain, Decreased ROM/flexibility, Decreased joint mobility, Decreased strength, Impaired balance, Impaired gait, Impaired muscle performance, Decreased activity tolerance, and Impaired posture. These impairments interfere with their ability to perform self care tasks, work tasks, recreational activities, household chores, driving , and community mobility as compared to previous level of function.     Clinical Decision Making (Complexity):  Clinical Presentation: Stable/Uncomplicated  Clinical Presentation Rationale: based on medical and personal factors listed in PT evaluation  Clinical Decision Making (Complexity): Low complexity    PLAN OF CARE  Treatment Interventions:  Modalities: Mechanical Traction  Interventions: Gait Training, Manual Therapy, Neuromuscular Re-education, Therapeutic Activity, Therapeutic Exercise, Self-Care/Home Management    Long Term Goals     PT Goal 1  Goal Identifier: Prolonged Positions  Goal Description: Pt will be able to sit >30 min or stand >30 min without increase in symptoms <2/10 NRS  Rationale: to maximize safety and independence with performance of ADLs and functional tasks;to maximize safety and independence within the home;to maximize  safety and independence within the community;to maximize safety and independence with transportation;to maximize safety and independence with self cares  Target Date: 04/18/24  PT Goal 2  Goal Identifier: Return to work  Goal Description: Pt will be able to return to work 40 hours a week without increase in symptoms.  Rationale: to maximize safety and independence with performance of ADLs and functional tasks;to maximize safety and independence within the community;to maximize safety and independence with self cares  Target Date: 04/18/24      Frequency of Treatment: 1x/week  Duration of Treatment: 8 weeks    Recommended Referrals to Other Professionals:   Education Assessment:   Learner/Method: Patient  Education Comments: Eager to participate in therapy    Risks and benefits of evaluation/treatment have been explained.   Patient/Family/caregiver agrees with Plan of Care.     Evaluation Time:     PT Eval, Low Complexity Minutes (68772): 20       Signing Clinician: Ewa Ramirez PT

## 2024-02-29 ENCOUNTER — OFFICE VISIT (OUTPATIENT)
Dept: PHYSICAL MEDICINE AND REHAB | Facility: CLINIC | Age: 64
End: 2024-02-29
Payer: COMMERCIAL

## 2024-02-29 ENCOUNTER — HOSPITAL ENCOUNTER (OUTPATIENT)
Dept: RADIOLOGY | Facility: CLINIC | Age: 64
Discharge: HOME | End: 2024-02-29
Attending: NURSE PRACTITIONER | Admitting: NURSE PRACTITIONER
Payer: COMMERCIAL

## 2024-02-29 VITALS — DIASTOLIC BLOOD PRESSURE: 76 MMHG | SYSTOLIC BLOOD PRESSURE: 139 MMHG | HEART RATE: 71 BPM

## 2024-02-29 DIAGNOSIS — M46.1 SACROILIITIS (H): ICD-10-CM

## 2024-02-29 DIAGNOSIS — M46.1 SACROILIITIS (H): Primary | ICD-10-CM

## 2024-02-29 PROCEDURE — 99213 OFFICE O/P EST LOW 20 MIN: CPT | Performed by: NURSE PRACTITIONER

## 2024-02-29 PROCEDURE — 72202 X-RAY EXAM SI JOINTS 3/> VWS: CPT

## 2024-02-29 NOTE — PROGRESS NOTES
ASSESSMENT: Callum Haywood is a 63 year old male who presents for consultation at the request of PCP Stephie Lewis, who presents today for a follow up patient evaluation of:    -lumbar radiculopathy    Patient is neurologically intact on exam. He has a positive R straight leg raise, but pain is posterior aspect of the leg and multiple SI maneuvers are positive. Symptoms today seem most consistent with sacroiliitis. Given that his pain has plateaud, I recommended a R SI joint injection. His  lumbar XR from 2020 appear to show some mild degenerative changes of his R SI joint but I recommended SI specific XR for further evaluation before procedure. We discussed risks and benefits of SI injections and he elects to proceed.    He will continue physical therapy and continue NSAIDs and Flexeril as needed.  We will plan to extend his restrictions until at least 4/1 or sooner if feeling better.         2/22/2024     4:00 PM   OSWESTRY DISABILITY INDEX   Count 8   Sum 15   Oswestry Score (%) 37.5 %            Diagnoses and all orders for this visit:  Sacroiliitis (H24)  -     XR Sacroiliac Joint G/E 3 Views; Future  -     PAIN Joint Injection Sacroiliac Joint Right; Future          PLAN:  Reviewed spine anatomy and disease process. Discussed diagnosis and treatment options with the patient today. A shared decision making model was used.  The patient's values and choices were respected. The following represents what was discussed and decided upon by the provider and the patient.      -DIAGNOSTIC TESTS:  Images were personally reviewed and interpreted and explained to patient today using a spine model.   --I ordered R SI xr    -PHYSICAL THERAPY:    -- continue physical therapy   discussed the importance of core strengthening, ROM, stretching exercises with the patient and how each of these entities is important in decreasing pain.  Explained to the patient that the purpose of physical therapy is to teach the patient  a home exercise program.  These exercises need to be performed every day in order to decrease pain and prevent future occurrences of pain.        -MEDICATIONS:    -continue ibuprofen 800mg TID PRN   -continue flexeril 10mg TID PRN  -tylenol otc as directed prn   -consider gabapentin   Discussed multiple medication options today with patient. Discussed risks, side effects, and proper use of medications. Patient verbalized understanding.    -INTERVENTIONS:    Discussed the role of R SI injections with patient today.   Patient would be a good candidate in the future for epidural steroid injections or MBB  based on his response to physical therapy and medications    -PATIENT EDUCATION:  Total time of 20 minutes, on the day of service, spent with the patient, reviewing the chart, placing orders, and documenting.   -Today we also discussed the pros and cons of the current treatment plan.    -FOLLOW-UP:   follow up in person to review lumbar MRI     Advised patient to call the Spine Center if symptoms worsen, new numbness or weakness develops in the legs, or if they develop new or worsening problems controlling bladder or bowel function.   ______________________________________________________________________    SUBJECTIVE:    Callum unfortunately does not endorse any improvement since last visit. Pain is at a 4-5/10. It radiates from his R buttocks area into the right posterior thigh and sometimes into the R calf. Worse with extending his R leg and with sitting, particularly prolonged sitting. The pain improves with walking, stretching. He is not having any new symptoms. He is taking ibuprofen 800mg and flexeril without much relief. He has started PT and done one session. He has been doing the home exercises diligently. He has been going to the gym daily and walking about 2 miles per day. He is still unable to lift or do anything physical without pain flaring up. He is not able to run.         Per original  visit  HPI:  Callum Haywood  Is a 63 year old male hx GERD, IBS who presents today for new patient evaluation of lumbar radiculopathy     Patient's symptoms began suddenly in January while standing. Originally had pain in the left low back and left gluteal region. He was seen at urgent care on 1/20 and treated with prednisone and flexeril which really helped.     Then, 4 days ago, he woke up with R lower pain and R gluteal region, right hip, and in the posterior thigh and calf. The pain was worse with sitting to a 9/10. Improves to a 5/10 with standing and walking. Whenever he went to drive,  he would have to sit on his left butt cheek. Describes the pain as an electric shock. The next day, he went to the gym which he admits was a bad idea. The following day, the pain was so bad he could not drive and he went to see his PCP.     Today the pain is a 6/10. It is a 9/10 at worst, 6/10 at best. The pain is worse with sitting. Improves with standing and walking. He was seen by his pcp on 2/4 and given a toradol injection which really helped and took tylenol as well. Yesterday he went to the pharmacy and picked up the naproxen 550mg twice daily. He has not noticed a significant difference yet. Today he has new pain in the right calf. He has tingling in the same distribution.      He denies any leg weakness, or changes in bowel or bladder control.    He has not done any PT. He has been doing exercises from a prior PT course years ago on a daily basis without significant relief. He is an avid exerciser, runs daily, does exercises with weights. 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    He has had previous injections but states he does not recall any relief after these.  L5-S1 translaminar epidural steroid injection July 2020  L4-5 translaminar epidural steroid injection may 2020    He had some lumbar xr taken in March of 2023      -Treatment to Date:      -Medications:  Prednisone  Flexeril 10mg  Naproxen 550mg  Medrol Dosepak  Ibuprofen 800mg    Current Outpatient Medications   Medication    cholecalciferol (VITAMIN D) 1000 UNIT tablet    cyclobenzaprine (FLEXERIL) 10 MG tablet    ibuprofen (ADVIL/MOTRIN) 800 MG tablet     No current facility-administered medications for this visit.       Allergies   Allergen Reactions    No Known Allergies     Seasonal Allergies        Past Medical History:   Diagnosis Date    DDD (degenerative disc disease), lumbar     Esophageal reflux     Helicobacter pylori (H. pylori)     Irritable bowel syndrome     Low back pain     Other specified psychosexual disorder     Pure hypercholesterolemia         Patient Active Problem List   Diagnosis    GERD    IBS    PSYCHOSEXUAL DISORDER    HYPERLIPIDEMIA LDL GOAL <160    Posttraumatic stress disorder    Health Care Home    Chronic bilateral low back pain with bilateral sciatica    Diabetes mellitus, type 2 (H)    Acute right-sided low back pain with right-sided sciatica    Sacroiliitis (H24)       Past Surgical History:   Procedure Laterality Date    COLONOSCOPY N/A 5/12/2023    Procedure: COLONOSCOPY, WITH POLYPECTOMY;  Surgeon: Mando Bowen MD;  [place] [facility] [place]    NO HISTORY OF SURGERY         Family History   Problem Relation Age of Onset    C.A.D. No family hx of     Diabetes No family hx of     Hypertension No family hx of     Cerebrovascular Disease No family hx of     Breast Cancer No family hx of     Cancer - colorectal No family hx of     Prostate Cancer No family hx of     Alcohol/Drug No family hx of     Allergies No family hx of     Alzheimer Disease No family hx of        Reviewed past medical, surgical, and family history with patient found on new patient intake packet located in EMR Media tab.     SOCIAL HX: nonsmoker, no alcohol use, no rec drug use, no heavy drinking      OBJECTIVE:  /76   Pulse 71     PHYSICAL EXAMINATION:    --CONSTITUTIONAL:  Vital signs  as above.  No acute distress.  The patient is well nourished and well groomed.  --PSYCHIATRIC:  Appropriate mood and affect. The patient is awake, alert, oriented to person, place, time and answering questions appropriately with clear speech.    --SKIN:  Skin over the face, bilateral lower extremities, and posterior torso is clean, dry, intact without rashes.    --RESPIRATORY: Normal rhythm and effort. No abnormal accessory muscle breathing patterns noted.   --ABDOMINAL:  Non-distended.    --GROSS MOTOR: Gait is non-antalgic. Easily arises from a seated position. Toe walking and heel walking are normal however pain is exacerbated with heel walking      --LOWER EXTREMITY MOTOR TESTING:  Hip flexion: right 5/5, left 5/5  Hip abduction: right 5/5, left 5/5  Hip adduction: right 5/5, left 5/5   Quads: right 5/5, left 5/5  Hamstrings: right 5/5, left 5/5  Dorsiflexion: right 5/5, left 5/5  Plantar flexion: right 5/5, left 5/5    Great toe MTP extension/EHL: right 5/5, left 5/5    --NEUROLOGICAL:  2/4 patellar and achilles reflexes bilaterally. Sensation to light touch is intact throughout both lower extremities. Babinski is negative. No clonus.    --MUSCULOSKELETAL: Lumbar spine inspection reveals no evidence of deformity. No point tenderness to palpation lumbar spine. No lumbar paraspinal musculature tenderness.     Straight leg raising is positive on the right    --HIPS: Full range of motion bilaterally. Negative HAMILTON and Negative FADIR bilaterally.     --SACROILIAC JOINT: Gaenslen's Test was positive on right. Thigh thrust on right was positive today as well. Sacroiliac Joint Compression Test was positive on right. One finger point test was positive on right. Positive right SI joint tenderness    --VASCULAR:  Bilateral lower extremities are warm without any discoloration.  There is no pitting edema of the bilateral lower extremities.    RESULTS:   Prior medical records from [facility]where were  reviewed today.    Imaging: Spine imaging was personally reviewed and interpreted today. The images were shown to the patient and the findings were explained using a spine model.    Narrative & Impression   EXAM: MR LUMBAR SPINE W/O CONTRAST  LOCATION: [facility]  DATE: 2/17/2024     INDICATION:  Lumbar radiculopathy, right posterior leg pain and tingling. treatment planning  COMPARISON: Lumbar spine MRI 01/30/2020  TECHNIQUE: Routine Lumbar Spine MRI without IV contrast.     FINDINGS:   Transitional anatomy with partial lumbarization of S1 and incompletely formed disc space at S1-S2. L5-S1 is defined on image 10 of series 6.  Straightening of the usual lumbar lordosis. No spondylolisthesis. Vertebral body heights maintained.  Canal   narrowing on a developmental basis. Few levels of degenerative fatty marrow endplate changes (Modic type II). Otherwise, no  suspicious marrow signal abnormality. Conus medullaris at L2. Incidental fatty filum.     Prevertebral soft tissues are unremarkable. Mild dorsal paraspinal muscular atrophy. Visualized intra-abdominal structures are unremarkable.     T12-L1: Unremarkable appearance of the disc. No significant facet arthropathy. No significant canal or foraminal stenosis.      L1-L2: Minimal bulge. No significant disc height loss. Mild bilateral facet arthropathy. No significant canal or foraminal stenosis.     L2-L3: No significant disc herniation or disc height loss. Mild to moderate bilateral facet arthropathy. No significant canal or foraminal stenosis.      L3-L4: Diffuse bulge with contact of the exiting L3 nerve roots at the foramen. Mild disc height loss. Mild to moderate bilateral facet arthropathy with ligamentum flavum hypertrophy. No significant canal or foraminal stenosis.     L4-L5: Diffuse bulge with contact of the exiting right L4 nerve root at the foramen. Mild disc height loss. Mild to moderate bilateral facet arthropathy with  ligamentum flavum hypertrophy. Moderate canal stenosis. Severe lateral recess stenosis   bilaterally. No significant foraminal stenosis.     L5-S1: Diffuse bulge with posterior disc and osteophyte. Mild disc height loss. Mild bilateral facet arthropathy with ligamentum flavum hypertrophy. No significant canal stenosis. Moderate bilateral foraminal stenosis.                                                                      IMPRESSION:  1.  Multilevel lumbar degenerative changes superimposed on developmental canal narrowing, not significantly changed since 2020.  2.  Unchanged moderate canal stenosis and severe lateral recess stenosis bilaterally at L4-L5.  3.  Contact of the bilateral exiting L3 and right L4 nerve roots at the foramen.       EXAM: XR LUMBAR SPINE 2/3 VIEWS  LOCATION: [facility]  DATE/TIME: 3/10/2023 9:47 AM     INDICATION: low back pain after fall 2 weeks ago  COMPARISON: Lumbar spine radiograph 11/03/2020                                                                      IMPRESSION: There are 5 lumbar type vertebral bodies. There is mild levoconvex curvature of the lumbar spine. No significant loss of disc height. Unchanged mild to moderate facet arthropathy at the lumbosacral junction. No acute fracture.      Exam: AP and lateral views of the lumbar spine dated 11/3/2020.     COMPARISON: MRI dated 1/30/2020.     CLINICAL HISTORY: Lumbar pain.     FINDINGS: AP and lateral views of the lumbar spine with flexion and  extension were obtained. There are 5 lumbar type vertebral bodies for  the purposes of this dictation. The lumbar vertebral bodies are well  aligned and well maintained without evidence of compression fracture.  No significant movement on flexion and extension views. Mild  multilevel anterior endplate spurring with minimal disc space  narrowing.                                                                      IMPRESSION: Mild degenerative disc disease in  the lumbar spine, as  above.     MD Deborah ROSADO FNP-C  [facility]  O. [phone]

## 2024-02-29 NOTE — LETTER
February 29, 2024      Callum Haywood  [address]  [place] [address]        To Whom It May Concern:    Callum Haywood was seen in our clinic today 2/29/24. He must continue current work restrictions through 4/1/24, and we will re-evaluate him after his injection appt and consider clearance without restriction at that time. Restrictions are as follows:  no lifting >10lbs. Avoid repetitive bending, twisting, pushing, pulling. Must be able to sit and take breaks for ice/heat as needed. If you have questions please feel free to contact our office.      Sincerely,        Deborah VERONICA  [facility]  O. [phone]

## 2024-02-29 NOTE — PATIENT INSTRUCTIONS
Imaging (Xray SI Joint) has been ordered today.   Radiology will call you to schedule. Please call below if you do not hear from them in the next couple of days.     [facility] Scheduling:  Please call [phone] to schedule your image(s) (select option #1).    There are 3 different locations:    [facility]  [address] [facility] [place]  [address]   [place] [address]    Or [facility]  [address] in [place]        Continue PT         An injection has been ordered today to potentially help with your pain symptoms. These injections do not fix what is going on in your back, therefore they typically do not take away the pain completely, however they can many times help improve symptoms. Injections should always be completed along with other modalities such as physical therapy for the best long term outcomes. If injections alone are done, then pain will likely return.     [facility] Injection Requirements:    A  is required for all fluoroscopically-guided injections.  Injection appointments may be cancelled if there are signs/symptoms of an active infection or if the patient is being actively treated with antibiotics for a diagnosed infection.  Patients may have their steroid injection cancelled if they have had another steroid injection within 2 weeks.  Diabetic patients will have their blood glucose levels checked the day of their injection and the appointment will be rescheduled if the blood glucose level is 300 or higher.  Patients with allergies to cortisone, local anesthetics, iodine, or contrast dye should contact the Spine Center to further discuss these considerations.  Patients scheduled for medial branch block diagnostic injections should refrain from taking pain medication the day of the procedure.  The medial branch block injection appointment will  be rescheduled if the patient's pain rating is not 5/10 or greater at the time of the procedure.  Patients taking warfarin/Coumadin will have their INR checked the day of the procedure and the procedure may be rescheduled if the INR is greater than 3.0.  Please contact the Spine Center (#[phone]) if you are taking any prescription blood-thinning medications (warfarin, Plavix, Lovenox, Eliquis, Brilinta, Effient, etc.) as special dosing adjustments may need to be made depending on the type of injection you are scheduled to receive.  It is recommended that you delay having your steroid injection if you have received a flu shot or shingles vaccine within 2 weeks.     ~Please call our [facility] Nurse Navigation line [phone] with any questions or concerns about your treatment plan, if symptoms worsen and you would like to be seen urgently, or if you have any new or worsening numbness, weakness, or problems controlling bladder and bowel function.  ~You are also welcome to contact Deborah Farley via [employer], but please be aware that responses to [employer] message may take 2-3 days due to the high volume of patients seen in clinic.

## 2024-02-29 NOTE — LETTER
2/29/2024         RE: Callum Haywood  [address]  [place] [address]        Dear Colleague,    Thank you for referring your patient, Callum Haywood, to the [facility]. Please see a copy of my visit note below.    ASSESSMENT: Callum Haywood is a 63 year old male who presents for consultation at the request of PCP Stephie Lewis, who presents today for a follow up patient evaluation of:    -lumbar radiculopathy    Patient is neurologically intact on exam. He has a positive R straight leg raise, but pain is posterior aspect of the leg and multiple SI maneuvers are positive. Symptoms today seem most consistent with sacroiliitis. Given that his pain has plateaud, I recommended a R SI joint injection. His  lumbar XR from 2020 appear to show some mild degenerative changes of his R SI joint but I recommended SI specific XR for further evaluation before procedure. We discussed risks and benefits of SI injections and he elects to proceed.    He will continue physical therapy and continue NSAIDs and Flexeril as needed.  We will plan to extend his restrictions until at least 4/1 or sooner if feeling better.         2/22/2024     4:00 PM   OSWESTRY DISABILITY INDEX   Count 8   Sum 15   Oswestry Score (%) 37.5 %            Diagnoses and all orders for this visit:  Sacroiliitis (H24)  -     XR Sacroiliac Joint G/E 3 Views; Future  -     PAIN Joint Injection Sacroiliac Joint Right; Future          PLAN:  Reviewed spine anatomy and disease process. Discussed diagnosis and treatment options with the patient today. A shared decision making model was used.  The patient's values and choices were respected. The following represents what was discussed and decided upon by the provider and the patient.      -DIAGNOSTIC TESTS:  Images were personally reviewed and interpreted and explained to patient today using a spine model.   --I ordered R SI xr    -PHYSICAL THERAPY:     -- continue physical therapy   discussed the importance of core strengthening, ROM, stretching exercises with the patient and how each of these entities is important in decreasing pain.  Explained to the patient that the purpose of physical therapy is to teach the patient a home exercise program.  These exercises need to be performed every day in order to decrease pain and prevent future occurrences of pain.        -MEDICATIONS:    -continue ibuprofen 800mg TID PRN   -continue flexeril 10mg TID PRN  -tylenol otc as directed prn   -consider gabapentin   Discussed multiple medication options today with patient. Discussed risks, side effects, and proper use of medications. Patient verbalized understanding.    -INTERVENTIONS:    Discussed the role of R SI injections with patient today.   Patient would be a good candidate in the future for epidural steroid injections or MBB  based on his response to physical therapy and medications    -PATIENT EDUCATION:  Total time of 20 minutes, on the day of service, spent with the patient, reviewing the chart, placing orders, and documenting.   -Today we also discussed the pros and cons of the current treatment plan.    -FOLLOW-UP:   follow up in person to review lumbar MRI     Advised patient to call the Spine Center if symptoms worsen, new numbness or weakness develops in the legs, or if they develop new or worsening problems controlling bladder or bowel function.   ______________________________________________________________________    SUBJECTIVE:    Callum unfortunately does not endorse any improvement since last visit. Pain is at a 4-5/10. It radiates from his R buttocks area into the right posterior thigh and sometimes into the R calf. Worse with extending his R leg and with sitting, particularly prolonged sitting. The pain improves with walking, stretching. He is not having any new symptoms. He is taking ibuprofen 800mg and flexeril without much relief. He has started PT  and done one session. He has been doing the home exercises diligently. He has been going to the gym daily and walking about 2 miles per day. He is still unable to lift or do anything physical without pain flaring up. He is not able to run.         Per original visit  HPI:  Callum Haywood  Is a 63 year old male hx GERD, IBS who presents today for new patient evaluation of lumbar radiculopathy     Patient's symptoms began suddenly in January while standing. Originally had pain in the left low back and left gluteal region. He was seen at urgent care on 1/20 and treated with prednisone and flexeril which really helped.     Then, 4 days ago, he woke up with R lower pain and R gluteal region, right hip, and in the posterior thigh and calf. The pain was worse with sitting to a 9/10. Improves to a 5/10 with standing and walking. Whenever he went to drive,  he would have to sit on his left butt cheek. Describes the pain as an electric shock. The next day, he went to the gym which he admits was a bad idea. The following day, the pain was so bad he could not drive and he went to see his PCP.     Today the pain is a 6/10. It is a 9/10 at worst, 6/10 at best. The pain is worse with sitting. Improves with standing and walking. He was seen by his pcp on 2/4 and given a toradol injection which really helped and took tylenol as well. Yesterday he went to the pharmacy and picked up the naproxen 550mg twice daily. He has not noticed a significant difference yet. Today he has new pain in the right calf. He has tingling in the same distribution.      He denies any leg weakness, or changes in bowel or bladder control.    He has not done any PT. He has been doing exercises from a prior PT course years ago on a daily basis without significant relief. He is an avid exerciser, runs daily, does exercises with weights. 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    He has had previous  injections but states he does not recall any relief after these.  L5-S1 translaminar epidural steroid injection July 2020  L4-5 translaminar epidural steroid injection may 2020    He had some lumbar xr taken in March of 2023      -Treatment to Date:     -Medications:  Prednisone  Flexeril 10mg  Naproxen 550mg  Medrol Dosepak  Ibuprofen 800mg    Current Outpatient Medications   Medication     cholecalciferol (VITAMIN D) 1000 UNIT tablet     cyclobenzaprine (FLEXERIL) 10 MG tablet     ibuprofen (ADVIL/MOTRIN) 800 MG tablet     No current facility-administered medications for this visit.       Allergies   Allergen Reactions     No Known Allergies      Seasonal Allergies        Past Medical History:   Diagnosis Date     DDD (degenerative disc disease), lumbar      Esophageal reflux      Helicobacter pylori (H. pylori)      Irritable bowel syndrome      Low back pain      Other specified psychosexual disorder      Pure hypercholesterolemia         Patient Active Problem List   Diagnosis     GERD     IBS     PSYCHOSEXUAL DISORDER     HYPERLIPIDEMIA LDL GOAL <160     Posttraumatic stress disorder     Health Care Home     Chronic bilateral low back pain with bilateral sciatica     Diabetes mellitus, type 2 (H)     Acute right-sided low back pain with right-sided sciatica     Sacroiliitis (H24)       Past Surgical History:   Procedure Laterality Date     COLONOSCOPY N/A 5/12/2023    Procedure: COLONOSCOPY, WITH POLYPECTOMY;  Surgeon: Mando Bowen MD;  Location: [facility] OR     NO HISTORY OF SURGERY         Family History   Problem Relation Age of Onset     C.A.D. No family hx of      Diabetes No family hx of      Hypertension No family hx of      Cerebrovascular Disease No family hx of      Breast Cancer No family hx of      Cancer - colorectal No family hx of      Prostate Cancer No family hx of      Alcohol/Drug No family hx of      Allergies No family hx of      Alzheimer Disease No family hx of        Reviewed past  medical, surgical, and family history with patient found on new patient intake packet located in EMR Media tab.     SOCIAL HX: nonsmoker, no alcohol use, no rec drug use, no heavy drinking      OBJECTIVE:  /76   Pulse 71     PHYSICAL EXAMINATION:    --CONSTITUTIONAL:  Vital signs as above.  No acute distress.  The patient is well nourished and well groomed.  --PSYCHIATRIC:  Appropriate mood and affect. The patient is awake, alert, oriented to person, place, time and answering questions appropriately with clear speech.    --SKIN:  Skin over the face, bilateral lower extremities, and posterior torso is clean, dry, intact without rashes.    --RESPIRATORY: Normal rhythm and effort. No abnormal accessory muscle breathing patterns noted.   --ABDOMINAL:  Non-distended.    --GROSS MOTOR: Gait is non-antalgic. Easily arises from a seated position. Toe walking and heel walking are normal however pain is exacerbated with heel walking      --LOWER EXTREMITY MOTOR TESTING:  Hip flexion: right 5/5, left 5/5  Hip abduction: right 5/5, left 5/5  Hip adduction: right 5/5, left 5/5   Quads: right 5/5, left 5/5  Hamstrings: right 5/5, left 5/5  Dorsiflexion: right 5/5, left 5/5  Plantar flexion: right 5/5, left 5/5    Great toe MTP extension/EHL: right 5/5, left 5/5    --NEUROLOGICAL:  2/4 patellar and achilles reflexes bilaterally. Sensation to light touch is intact throughout both lower extremities. Babinski is negative. No clonus.    --MUSCULOSKELETAL: Lumbar spine inspection reveals no evidence of deformity. No point tenderness to palpation lumbar spine. No lumbar paraspinal musculature tenderness.     Straight leg raising is positive on the right    --HIPS: Full range of motion bilaterally. Negative HAMILTON and Negative FADIR bilaterally.     --SACROILIAC JOINT: Gaenslen's Test was positive on right. Thigh thrust on right was positive today as well. Sacroiliac Joint Compression Test was positive on right. One finger point  test was positive on right. Positive right SI joint tenderness    --VASCULAR:  Bilateral lower extremities are warm without any discoloration.  There is no pitting edema of the bilateral lower extremities.    RESULTS:   Prior medical records from [facility] Everywhere were reviewed today.    Imaging: Spine imaging was personally reviewed and interpreted today. The images were shown to the patient and the findings were explained using a spine model.    Narrative & Impression   EXAM: MR LUMBAR SPINE W/O CONTRAST  LOCATION: [facility]  DATE: 2/17/2024     INDICATION:  Lumbar radiculopathy, right posterior leg pain and tingling. treatment planning  COMPARISON: Lumbar spine MRI 01/30/2020  TECHNIQUE: Routine Lumbar Spine MRI without IV contrast.     FINDINGS:   Transitional anatomy with partial lumbarization of S1 and incompletely formed disc space at S1-S2. L5-S1 is defined on image 10 of series 6.  Straightening of the usual lumbar lordosis. No spondylolisthesis. Vertebral body heights maintained.  Canal   narrowing on a developmental basis. Few levels of degenerative fatty marrow endplate changes (Modic type II). Otherwise, no  suspicious marrow signal abnormality. Conus medullaris at L2. Incidental fatty filum.     Prevertebral soft tissues are unremarkable. Mild dorsal paraspinal muscular atrophy. Visualized intra-abdominal structures are unremarkable.     T12-L1: Unremarkable appearance of the disc. No significant facet arthropathy. No significant canal or foraminal stenosis.      L1-L2: Minimal bulge. No significant disc height loss. Mild bilateral facet arthropathy. No significant canal or foraminal stenosis.     L2-L3: No significant disc herniation or disc height loss. Mild to moderate bilateral facet arthropathy. No significant canal or foraminal stenosis.      L3-L4: Diffuse bulge with contact of the exiting L3 nerve roots at the foramen. Mild disc height loss. Mild  to moderate bilateral facet arthropathy with ligamentum flavum hypertrophy. No significant canal or foraminal stenosis.     L4-L5: Diffuse bulge with contact of the exiting right L4 nerve root at the foramen. Mild disc height loss. Mild to moderate bilateral facet arthropathy with ligamentum flavum hypertrophy. Moderate canal stenosis. Severe lateral recess stenosis   bilaterally. No significant foraminal stenosis.     L5-S1: Diffuse bulge with posterior disc and osteophyte. Mild disc height loss. Mild bilateral facet arthropathy with ligamentum flavum hypertrophy. No significant canal stenosis. Moderate bilateral foraminal stenosis.                                                                      IMPRESSION:  1.  Multilevel lumbar degenerative changes superimposed on developmental canal narrowing, not significantly changed since 2020.  2.  Unchanged moderate canal stenosis and severe lateral recess stenosis bilaterally at L4-L5.  3.  Contact of the bilateral exiting L3 and right L4 nerve roots at the foramen.       EXAM: XR LUMBAR SPINE 2/3 VIEWS  LOCATION: [facility]  DATE/TIME: 3/10/2023 9:47 AM     INDICATION: low back pain after fall 2 weeks ago  COMPARISON: Lumbar spine radiograph 11/03/2020                                                                      IMPRESSION: There are 5 lumbar type vertebral bodies. There is mild levoconvex curvature of the lumbar spine. No significant loss of disc height. Unchanged mild to moderate facet arthropathy at the lumbosacral junction. No acute fracture.      Exam: AP and lateral views of the lumbar spine dated 11/3/2020.     COMPARISON: MRI dated 1/30/2020.     CLINICAL HISTORY: Lumbar pain.     FINDINGS: AP and lateral views of the lumbar spine with flexion and  extension were obtained. There are 5 lumbar type vertebral bodies for  the purposes of this dictation. The lumbar vertebral bodies are well  aligned and well maintained without  evidence of compression fracture.  No significant movement on flexion and extension views. Mild  multilevel anterior endplate spurring with minimal disc space  narrowing.                                                                      IMPRESSION: Mild degenerative disc disease in the lumbar spine, as  above.     MD Deborah ROSADO [facility]  O. [phone]             Again, thank you for allowing me to participate in the care of your patient.        Sincerely,        CHESTER Martinez CNP

## 2024-03-01 ENCOUNTER — TELEPHONE (OUTPATIENT)
Dept: PHYSICAL MEDICINE AND REHAB | Facility: CLINIC | Age: 64
End: 2024-03-01
Payer: COMMERCIAL

## 2024-03-01 NOTE — TELEPHONE ENCOUNTER
Call placed to patient with provider's results and recommendations.  Pt stated understanding. Confirmed date/time of patients inj appt next week.

## 2024-03-01 NOTE — TELEPHONE ENCOUNTER
----- Message from CHESTER Turner CNP sent at 3/1/2024  8:45 AM CST -----  Please let Callum know that his SI xr look normal - he does not have any fractures or concerning findings. We will proceed with SI injection as planned

## 2024-03-06 ENCOUNTER — RADIOLOGY INJECTION OFFICE VISIT (OUTPATIENT)
Dept: PHYSICAL MEDICINE AND REHAB | Facility: CLINIC | Age: 64
End: 2024-03-06
Attending: NURSE PRACTITIONER
Payer: COMMERCIAL

## 2024-03-06 VITALS
TEMPERATURE: 97.8 F | SYSTOLIC BLOOD PRESSURE: 120 MMHG | DIASTOLIC BLOOD PRESSURE: 68 MMHG | HEART RATE: 66 BPM | OXYGEN SATURATION: 98 % | RESPIRATION RATE: 16 BRPM

## 2024-03-06 DIAGNOSIS — M46.1 SACROILIITIS (H): ICD-10-CM

## 2024-03-06 PROCEDURE — 27096 INJECT SACROILIAC JOINT: CPT | Mod: RT | Performed by: PAIN MEDICINE

## 2024-03-06 RX ORDER — ROPIVACAINE HYDROCHLORIDE 5 MG/ML
INJECTION, SOLUTION EPIDURAL; INFILTRATION; PERINEURAL
Status: COMPLETED | OUTPATIENT
Start: 2024-03-06 | End: 2024-03-06

## 2024-03-06 RX ORDER — LIDOCAINE HYDROCHLORIDE 10 MG/ML
INJECTION, SOLUTION EPIDURAL; INFILTRATION; INTRACAUDAL; PERINEURAL
Status: COMPLETED | OUTPATIENT
Start: 2024-03-06 | End: 2024-03-06

## 2024-03-06 RX ORDER — METHYLPREDNISOLONE ACETATE 40 MG/ML
INJECTION, SUSPENSION INTRA-ARTICULAR; INTRALESIONAL; INTRAMUSCULAR; SOFT TISSUE
Status: COMPLETED | OUTPATIENT
Start: 2024-03-06 | End: 2024-03-06

## 2024-03-06 RX ADMIN — ROPIVACAINE HYDROCHLORIDE 2.5 ML: 5 INJECTION, SOLUTION EPIDURAL; INFILTRATION; PERINEURAL at 09:58

## 2024-03-06 RX ADMIN — METHYLPREDNISOLONE ACETATE 20 MG: 40 INJECTION, SUSPENSION INTRA-ARTICULAR; INTRALESIONAL; INTRAMUSCULAR; SOFT TISSUE at 09:58

## 2024-03-06 RX ADMIN — LIDOCAINE HYDROCHLORIDE 2 ML: 10 INJECTION, SOLUTION EPIDURAL; INFILTRATION; INTRACAUDAL; PERINEURAL at 09:58

## 2024-03-06 ASSESSMENT — PAIN SCALES - GENERAL
PAINLEVEL: MODERATE PAIN (5)
PAINLEVEL: MODERATE PAIN (5)

## 2024-03-06 NOTE — PATIENT INSTRUCTIONS
DISCHARGE INSTRUCTIONS    During office hours (8:00 a.m.- 4:00 p.m.) questions or concerns may be answered  by calling Spine Center Navigation Nurses at  [phone].  Messages received after hours will be returned the following business day.      In the case of an emergency, please dial 911 or seek assistance at the nearest Emergency Room/Urgent Care facility.     All Patients:    You may experience an increase in your symptoms for the first 2 days (It may take anywhere between 2 days- 2 weeks for the steroid to have maximum effect).    You may use ice on the injection site, as frequently as 20 minutes each hour if needed.    You may take your pain medicine.    You may continue taking your regular medication after your injection. If you have had a Medial Branch Block you may resume pain medication once your pain diary is completed.    You may shower. No swimming, tub bath or hot tub for 48 hours.  You may remove your bandaid/bandage as soon as you are home.    You may resume light activities, as tolerated.    Resume your usual diet as tolerated.    It is strongly advised that you do not drive for 1-3 hours post injection.    If you have had oral sedation:  Do not drive for 8 hours post injection.      If you have had IV sedation:  Do not drive for 24 hours post injection.  Do not operate hazardous machinery or make important personal/business decisions for 24 hours.      POSSIBLE STEROID SIDE EFFECTS (If steroid/cortisone was used for your procedure)    -If you experience these symptoms, it should only last for a short period    Swelling of the legs              Skin redness (flushing)     Mouth (oral) irritation   Blood sugar (glucose) levels            Sweats                    Mood changes  Headache  Sleeplessness  Weakened immune system for up to 14 days, which could increase the risk of saurabh the COVID-19 virus and/or experiencing more severe symptoms of the disease, if exposed.  Decreased  effectiveness of the flu vaccine if given within 2 weeks of the steroid.         POSSIBLE PROCEDURE SIDE EFFECTS  -Call the Spine Center if you are concerned  Increased Pain           Increased numbness/tingling      Nausea/Vomiting          Bruising/bleeding at site      Redness or swelling                                              Difficulty walking      Weakness           Fever greater than 100.5    *In the event of a severe headache after an epidural steroid injection that is relieved by lying down, please call the [facility] to speak with a clinical staff member*

## 2024-03-11 ENCOUNTER — THERAPY VISIT (OUTPATIENT)
Dept: PHYSICAL THERAPY | Facility: CLINIC | Age: 64
End: 2024-03-11
Payer: COMMERCIAL

## 2024-03-11 DIAGNOSIS — M54.41 ACUTE RIGHT-SIDED LOW BACK PAIN WITH RIGHT-SIDED SCIATICA: Primary | ICD-10-CM

## 2024-03-11 DIAGNOSIS — M46.1 SACROILIITIS (H): ICD-10-CM

## 2024-03-11 PROBLEM — M54.42 CHRONIC BILATERAL LOW BACK PAIN WITH BILATERAL SCIATICA: Status: RESOLVED | Noted: 2020-09-15 | Resolved: 2024-03-11

## 2024-03-11 PROBLEM — G89.29 CHRONIC BILATERAL LOW BACK PAIN WITH BILATERAL SCIATICA: Status: RESOLVED | Noted: 2020-09-15 | Resolved: 2024-03-11

## 2024-03-11 PROCEDURE — 97140 MANUAL THERAPY 1/> REGIONS: CPT | Mod: GP | Performed by: PHYSICAL THERAPIST

## 2024-03-11 PROCEDURE — 97110 THERAPEUTIC EXERCISES: CPT | Mod: GP | Performed by: PHYSICAL THERAPIST

## 2024-03-19 ENCOUNTER — THERAPY VISIT (OUTPATIENT)
Dept: PHYSICAL THERAPY | Facility: CLINIC | Age: 64
End: 2024-03-19
Payer: COMMERCIAL

## 2024-03-19 DIAGNOSIS — M54.41 ACUTE RIGHT-SIDED LOW BACK PAIN WITH RIGHT-SIDED SCIATICA: Primary | ICD-10-CM

## 2024-03-19 DIAGNOSIS — M46.1 SACROILIITIS (H): ICD-10-CM

## 2024-03-19 PROCEDURE — 97110 THERAPEUTIC EXERCISES: CPT | Mod: GP | Performed by: PHYSICAL THERAPIST

## 2024-03-19 PROCEDURE — 97140 MANUAL THERAPY 1/> REGIONS: CPT | Mod: GP | Performed by: PHYSICAL THERAPIST

## 2024-03-21 ENCOUNTER — OFFICE VISIT (OUTPATIENT)
Dept: PHYSICAL MEDICINE AND REHAB | Facility: CLINIC | Age: 64
End: 2024-03-21
Payer: COMMERCIAL

## 2024-03-21 VITALS
BODY MASS INDEX: 25.55 KG/M2 | HEART RATE: 68 BPM | WEIGHT: 159 LBS | SYSTOLIC BLOOD PRESSURE: 155 MMHG | HEIGHT: 66 IN | DIASTOLIC BLOOD PRESSURE: 71 MMHG

## 2024-03-21 DIAGNOSIS — M46.1 SACROILIITIS (H): Primary | ICD-10-CM

## 2024-03-21 PROCEDURE — 99213 OFFICE O/P EST LOW 20 MIN: CPT | Performed by: NURSE PRACTITIONER

## 2024-03-21 ASSESSMENT — PAIN SCALES - GENERAL: PAINLEVEL: MILD PAIN (2)

## 2024-03-21 NOTE — PROGRESS NOTES
ASSESSMENT: Callum Haywood is a 63 year old male who presents for consultation at the request of PCP Stephie Lewis, who presents today for a follow up patient evaluation of:    -lumbar radiculopathy, Sacroiliitis     more than 50% pain relief after right SI injection.  He is feeling ready to return to work and exam today is benign.  He is currently not taking any medications for pain and is satisfied with this.  We talked about the potential for repeat SI injections in the future.  For now, he will continue physical therapy and complete full course and follow-up as needed.        2/22/2024     4:00 PM   OSWESTRY DISABILITY INDEX   Count 8   Sum 15   Oswestry Score (%) 37.5 %            Diagnoses and all orders for this visit:  Sacroiliitis (H24)            PLAN:  Reviewed spine anatomy and disease process. Discussed diagnosis and treatment options with the patient today. A shared decision making model was used.  The patient's values and choices were respected. The following represents what was discussed and decided upon by the provider and the patient.      -DIAGNOSTIC TESTS:    -  No additional imaging ordered today    -PHYSICAL THERAPY:    -- continue physical therapy   discussed the importance of core strengthening, ROM, stretching exercises with the patient and how each of these entities is important in decreasing pain.  Explained to the patient that the purpose of physical therapy is to teach the patient a home exercise program.  These exercises need to be performed every day in order to decrease pain and prevent future occurrences of pain.        -MEDICATIONS:    - not taking any medications at this time.    -INTERVENTIONS:    Discussed the role of  repeat R SI injections with patient today.   Patient would be a good candidate in the future for epidural steroid injections or MBB  based on his response to physical therapy and medications    -PATIENT EDUCATION:  Total time of 20 minutes, on the day of  service, spent with the patient, reviewing the chart, placing orders, and documenting.   -Today we also discussed the pros and cons of the current treatment plan.    -FOLLOW-UP:    As needed    Advised patient to call the [facility] if symptoms worsen, new numbness or weakness develops in the legs, or if they develop new or worsening problems controlling bladder or bowel function.   ______________________________________________________________________    SUBJECTIVE:    Callum  endorses more than 50% improvement with right SI injection.  His pain is now at a 2 out of 10.  He only has slight pain in the right outer hip and upper calf in an isolated area.  He has been doing physical therapy and has done about 3 sessions so far, and has more booked.  He continues to resume exercises on his own at the gym.  He is not having to take any pain medications at this time.  He would like to return to work and plans to do so in April.  Denies any leg numbness, weakness, changes in bowel or bladder control.        Per original visit w/ updated meds/injections:     HPI:  Callum Haywood  Is a 63 year old male hx GERD, IBS who presents today for new patient evaluation of lumbar radiculopathy     Patient's symptoms began suddenly in January while standing. Originally had pain in the left low back and left gluteal region. He was seen at urgent care on 1/20 and treated with prednisone and flexeril which really helped.     Then, 4 days ago, he woke up with R lower pain and R gluteal region, right hip, and in the posterior thigh and calf. The pain was worse with sitting to a 9/10. Improves to a 5/10 with standing and walking. Whenever he went to drive,  he would have to sit on his left butt cheek. Describes the pain as an electric shock. The next day, he went to the gym which he admits was a bad idea. The following day, the pain was so bad he could not drive and he went to see his PCP.     Today the pain is a 6/10. It is a 9/10  at worst, 6/10 at best. The pain is worse with sitting. Improves with standing and walking. He was seen by his pcp on 2/4 and given a toradol injection which really helped and took tylenol as well. Yesterday he went to the pharmacy and picked up the naproxen 550mg twice daily. He has not noticed a significant difference yet. Today he has new pain in the right calf. He has tingling in the same distribution.      He denies any leg weakness, or changes in bowel or bladder control.    He has not done any PT. He has been doing exercises from a prior PT course years ago on a daily basis without significant relief. He is an avid exerciser, runs daily, does exercises with weights. 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    He has had previous injections but states he does not recall any relief after these.  L5-S1 translaminar epidural steroid injection July 2020  L4-5 translaminar epidural steroid injection may 2020    He had some lumbar xr taken in March of 2023      -Treatment to Date:     -Medications:  Prednisone  Flexeril 10mg  Naproxen 550mg  Medrol Dosepak  Ibuprofen 800mg    -Injections:  Right SI joint injection 3/6/2024, more than 50% relief    Current Outpatient Medications   Medication    cholecalciferol (VITAMIN D) 1000 UNIT tablet     No current facility-administered medications for this visit.       Allergies   Allergen Reactions    No Known Allergies     Seasonal Allergies        Past Medical History:   Diagnosis Date    DDD (degenerative disc disease), lumbar     Esophageal reflux     Helicobacter pylori (H. pylori)     Irritable bowel syndrome     Low back pain     Other specified psychosexual disorder     Pure hypercholesterolemia         Patient Active Problem List   Diagnosis    GERD    IBS    PSYCHOSEXUAL DISORDER    HYPERLIPIDEMIA LDL GOAL <160    Posttraumatic stress disorder    Health Care Home    Diabetes mellitus, type 2 (H)    Acute  "right-sided low back pain with right-sided sciatica    Sacroiliitis (H24)       Past Surgical History:   Procedure Laterality Date    COLONOSCOPY N/A 5/12/2023    Procedure: COLONOSCOPY, WITH POLYPECTOMY;  Surgeon: Mando Bowen MD;  Location: [facility] OR    NO HISTORY OF SURGERY         Family History   Problem Relation Age of Onset    C.A.D. No family hx of     Diabetes No family hx of     Hypertension No family hx of     Cerebrovascular Disease No family hx of     Breast Cancer No family hx of     Cancer - colorectal No family hx of     Prostate Cancer No family hx of     Alcohol/Drug No family hx of     Allergies No family hx of     Alzheimer Disease No family hx of        Reviewed past medical, surgical, and family history with patient found on new patient intake packet located in EMR Media tab.     SOCIAL HX: nonsmoker, no alcohol use, no rec drug use, no heavy drinking      OBJECTIVE:  BP (!) 155/71 (BP Location: Left arm, Patient Position: Sitting, Cuff Size: Adult Regular)   Pulse 68   Ht 5' 5.5\" (1.664 m)   Wt 159 lb (72.1 kg)   BMI 26.06 kg/m      PHYSICAL EXAMINATION:    --CONSTITUTIONAL:  Vital signs as above.  No acute distress.  The patient is well nourished and well groomed.  --PSYCHIATRIC:  Appropriate mood and affect. The patient is awake, alert, oriented to person, place, time and answering questions appropriately with clear speech.    --SKIN:  Skin over the face, bilateral lower extremities, and posterior torso is clean, dry, intact without rashes.    --RESPIRATORY: Normal rhythm and effort. No abnormal accessory muscle breathing patterns noted.   --ABDOMINAL:  Non-distended.    --GROSS MOTOR: Gait is non-antalgic. Easily arises from a seated position. Toe walking and heel walking are normal      --LOWER EXTREMITY MOTOR TESTING:  Hip flexion: right 5/5, left 5/5  Hip abduction: right 5/5, left 5/5  Hip adduction: right 5/5, left 5/5   Quads: right 5/5, left 5/5  Hamstrings: right 5/5, left " 5/5  Dorsiflexion: right 5/5, left 5/5  Plantar flexion: right 5/5, left 5/5    Great toe MTP extension/EHL: right 5/5, left 5/5    --NEUROLOGICAL:  2/4 patellar and achilles reflexes bilaterally. Sensation to light touch is intact throughout both lower extremities. Babinski is negative. No clonus.    --MUSCULOSKELETAL: Lumbar spine inspection reveals no evidence of deformity. No point tenderness to palpation lumbar spine. No lumbar paraspinal musculature tenderness.     Straight leg raising is negative on the right    --HIPS: Full range of motion bilaterally. Negative HAMILTON and Negative FADIR bilaterally.     --SACROILIAC JOINT: One finger point test was negative on right. No right SI joint tenderness    --VASCULAR:  Bilateral lower extremities are warm without any discoloration.  There is no pitting edema of the bilateral lower extremities.      Deborah Farley FNP-C  [facility]  O. [phone]

## 2024-03-21 NOTE — LETTER
3/21/2024         RE: Callum Haywood  [address]  [place] [address]        Dear Colleague,    Thank you for referring your patient, Callum Haywood, to the [facility]. Please see a copy of my visit note below.    ASSESSMENT: Callum Haywood is a 63 year old male who presents for consultation at the request of PCP Stephie Lewis, who presents today for a follow up patient evaluation of:    -lumbar radiculopathy, Sacroiliitis     more than 50% pain relief after right SI injection.  He is feeling ready to return to work and exam today is benign.  He is currently not taking any medications for pain and is satisfied with this.  We talked about the potential for repeat SI injections in the future.  For now, he will continue physical therapy and complete full course and follow-up as needed.        2/22/2024     4:00 PM   OSWESTRY DISABILITY INDEX   Count 8   Sum 15   Oswestry Score (%) 37.5 %            Diagnoses and all orders for this visit:  Sacroiliitis (H24)            PLAN:  Reviewed spine anatomy and disease process. Discussed diagnosis and treatment options with the patient today. A shared decision making model was used.  The patient's values and choices were respected. The following represents what was discussed and decided upon by the provider and the patient.      -DIAGNOSTIC TESTS:    -  No additional imaging ordered today    -PHYSICAL THERAPY:    -- continue physical therapy   discussed the importance of core strengthening, ROM, stretching exercises with the patient and how each of these entities is important in decreasing pain.  Explained to the patient that the purpose of physical therapy is to teach the patient a home exercise program.  These exercises need to be performed every day in order to decrease pain and prevent future occurrences of pain.        -MEDICATIONS:    - not taking any medications at this time.    -INTERVENTIONS:     Discussed the role of  repeat R SI injections with patient today.   Patient would be a good candidate in the future for epidural steroid injections or MBB  based on his response to physical therapy and medications    -PATIENT EDUCATION:  Total time of 20 minutes, on the day of service, spent with the patient, reviewing the chart, placing orders, and documenting.   -Today we also discussed the pros and cons of the current treatment plan.    -FOLLOW-UP:    As needed    Advised patient to call the [facility] if symptoms worsen, new numbness or weakness develops in the legs, or if they develop new or worsening problems controlling bladder or bowel function.   ______________________________________________________________________    SUBJECTIVE:    Callum  endorses more than 50% improvement with right SI injection.  His pain is now at a 2 out of 10.  He only has slight pain in the right outer hip and upper calf in an isolated area.  He has been doing physical therapy and has done about 3 sessions so far, and has more booked.  He continues to resume exercises on his own at the gym.  He is not having to take any pain medications at this time.  He would like to return to work and plans to do so in April.  Denies any leg numbness, weakness, changes in bowel or bladder control.        Per original visit w/ updated meds/injections:     HPI:  Callum Haywood  Is a 63 year old male hx GERD, IBS who presents today for new patient evaluation of lumbar radiculopathy     Patient's symptoms began suddenly in January while standing. Originally had pain in the left low back and left gluteal region. He was seen at urgent care on 1/20 and treated with prednisone and flexeril which really helped.     Then, 4 days ago, he woke up with R lower pain and R gluteal region, right hip, and in the posterior thigh and calf. The pain was worse with sitting to a 9/10. Improves to a 5/10 with standing and walking. Whenever he went to drive,   he would have to sit on his left butt cheek. Describes the pain as an electric shock. The next day, he went to the gym which he admits was a bad idea. The following day, the pain was so bad he could not drive and he went to see his PCP.     Today the pain is a 6/10. It is a 9/10 at worst, 6/10 at best. The pain is worse with sitting. Improves with standing and walking. He was seen by his pcp on 2/4 and given a toradol injection which really helped and took tylenol as well. Yesterday he went to the pharmacy and picked up the naproxen 550mg twice daily. He has not noticed a significant difference yet. Today he has new pain in the right calf. He has tingling in the same distribution.      He denies any leg weakness, or changes in bowel or bladder control.    He has not done any PT. He has been doing exercises from a prior PT course years ago on a daily basis without significant relief. He is an avid exerciser, runs daily, does exercises with weights. 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    He has had previous injections but states he does not recall any relief after these.  L5-S1 translaminar epidural steroid injection July 2020  L4-5 translaminar epidural steroid injection may 2020    He had some lumbar xr taken in March of 2023      -Treatment to Date:     -Medications:  Prednisone  Flexeril 10mg  Naproxen 550mg  Medrol Dosepak  Ibuprofen 800mg    -Injections:  Right SI joint injection 3/6/2024, more than 50% relief    Current Outpatient Medications   Medication     cholecalciferol (VITAMIN D) 1000 UNIT tablet     No current facility-administered medications for this visit.       Allergies   Allergen Reactions     No Known Allergies      Seasonal Allergies        Past Medical History:   Diagnosis Date     DDD (degenerative disc disease), lumbar      Esophageal reflux      Helicobacter pylori (H. pylori)      Irritable bowel syndrome      Low back pain       "Other specified psychosexual disorder      Pure hypercholesterolemia         Patient Active Problem List   Diagnosis     GERD     IBS     PSYCHOSEXUAL DISORDER     HYPERLIPIDEMIA LDL GOAL <160     Posttraumatic stress disorder     Health Care Home     Diabetes mellitus, type 2 (H)     Acute right-sided low back pain with right-sided sciatica     Sacroiliitis (H24)       Past Surgical History:   Procedure Laterality Date     COLONOSCOPY N/A 5/12/2023    Procedure: COLONOSCOPY, WITH POLYPECTOMY;  Surgeon: Mando Bowen MD;  Location: [facility] OR     NO HISTORY OF SURGERY         Family History   Problem Relation Age of Onset     C.A.D. No family hx of      Diabetes No family hx of      Hypertension No family hx of      Cerebrovascular Disease No family hx of      Breast Cancer No family hx of      Cancer - colorectal No family hx of      Prostate Cancer No family hx of      Alcohol/Drug No family hx of      Allergies No family hx of      Alzheimer Disease No family hx of        Reviewed past medical, surgical, and family history with patient found on new patient intake packet located in EMR Media tab.     SOCIAL HX: nonsmoker, no alcohol use, no rec drug use, no heavy drinking      OBJECTIVE:  BP (!) 155/71 (BP Location: Left arm, Patient Position: Sitting, Cuff Size: Adult Regular)   Pulse 68   Ht 5' 5.5\" (1.664 m)   Wt 159 lb (72.1 kg)   BMI 26.06 kg/m      PHYSICAL EXAMINATION:    --CONSTITUTIONAL:  Vital signs as above.  No acute distress.  The patient is well nourished and well groomed.  --PSYCHIATRIC:  Appropriate mood and affect. The patient is awake, alert, oriented to person, place, time and answering questions appropriately with clear speech.    --SKIN:  Skin over the face, bilateral lower extremities, and posterior torso is clean, dry, intact without rashes.    --RESPIRATORY: Normal rhythm and effort. No abnormal accessory muscle breathing patterns noted.   --ABDOMINAL:  Non-distended.    --GROSS MOTOR: " Gait is non-antalgic. Easily arises from a seated position. Toe walking and heel walking are normal      --LOWER EXTREMITY MOTOR TESTING:  Hip flexion: right 5/5, left 5/5  Hip abduction: right 5/5, left 5/5  Hip adduction: right 5/5, left 5/5   Quads: right 5/5, left 5/5  Hamstrings: right 5/5, left 5/5  Dorsiflexion: right 5/5, left 5/5  Plantar flexion: right 5/5, left 5/5    Great toe MTP extension/EHL: right 5/5, left 5/5    --NEUROLOGICAL:  2/4 patellar and achilles reflexes bilaterally. Sensation to light touch is intact throughout both lower extremities. Babinski is negative. No clonus.    --MUSCULOSKELETAL: Lumbar spine inspection reveals no evidence of deformity. No point tenderness to palpation lumbar spine. No lumbar paraspinal musculature tenderness.     Straight leg raising is negative on the right    --HIPS: Full range of motion bilaterally. Negative HAMILTON and Negative FADIR bilaterally.     --SACROILIAC JOINT: One finger point test was negative on right. No right SI joint tenderness    --VASCULAR:  Bilateral lower extremities are warm without any discoloration.  There is no pitting edema of the bilateral lower extremities.      Deborah Farley [facility]  [facility]  O. [phone]             Again, thank you for allowing me to participate in the care of your patient.        Sincerely,        Deborah Farley, [place] CNP

## 2024-04-29 PROBLEM — M46.1 SACROILIITIS (H): Status: RESOLVED | Noted: 2024-02-22 | Resolved: 2024-04-29

## 2024-04-29 PROBLEM — M54.41 ACUTE RIGHT-SIDED LOW BACK PAIN WITH RIGHT-SIDED SCIATICA: Status: RESOLVED | Noted: 2024-02-22 | Resolved: 2024-04-29

## 2024-04-29 NOTE — PROGRESS NOTES
Discharge Note    Progress reporting period is from initial evaluation date (please see noted date below) to Mar 19, 2024.  Linked Episodes   Type: Episode: Status: Noted: Resolved: Last update: Updated by:   PHYSICAL THERAPY low back and RLE pain 2024 Active 2/22/2024  3/19/2024 10:10 AM Ewa Ramirez PT      Comments:       Callum has not returned for physical therapy follow up and current status is unknown.  Please see information below for last relevant information on current status.  Patient seen for   visits.    SUBJECTIVE  Subjective changes noted by patient:     .  Current pain level is  .     Previous pain level was   .   Changes in function:  Yes (See Goal flowsheet attached for changes in current functional level)  Adverse reaction to treatment or activity: None    OBJECTIVE  Changes noted in objective findings:       ASSESSMENT/PLAN      Updated problem list and treatment plan:   Pain - HEP  Decreased ROM/flexibility - HEP  Decreased function - HEP  Decreased strength - HEP  STG/LTGs have been met or progress has been made towards goals:  Yes, please see goal flowsheet for most current information  Assessment of Progress: current status is unknown.    Last current status:     Self Management Plans:  HEP  I have re-evaluated this patient and find that the nature, scope, duration and intensity of the therapy is appropriate for the medical condition of the patient.  Shad continues to require the following intervention to meet STG and LTG's:  HEP.    Recommendations:  Discharge with current home program.  Patient to follow up with MD as needed.    Please refer to the daily flowsheet for treatment today, total treatment time and time spent performing 1:1 timed codes.

## 2024-06-19 ENCOUNTER — OFFICE VISIT (OUTPATIENT)
Dept: FAMILY MEDICINE | Facility: CLINIC | Age: 64
End: 2024-06-19
Payer: COMMERCIAL

## 2024-06-19 VITALS
HEIGHT: 65 IN | HEART RATE: 62 BPM | DIASTOLIC BLOOD PRESSURE: 70 MMHG | SYSTOLIC BLOOD PRESSURE: 138 MMHG | WEIGHT: 169.2 LBS | OXYGEN SATURATION: 99 % | TEMPERATURE: 97.5 F | BODY MASS INDEX: 28.19 KG/M2 | RESPIRATION RATE: 20 BRPM

## 2024-06-19 DIAGNOSIS — Z29.89 NEED FOR MALARIA PROPHYLAXIS: ICD-10-CM

## 2024-06-19 DIAGNOSIS — A09 TRAVELER'S DIARRHEA: ICD-10-CM

## 2024-06-19 DIAGNOSIS — Z23 NEED FOR PROPHYLACTIC VACCINATION AGAINST CHOLERA WITH TYPHOID-PARATYPHOID (CHOLERA + TAB) VACCINE: ICD-10-CM

## 2024-06-19 DIAGNOSIS — M79.652 PAIN OF LEFT THIGH: Primary | ICD-10-CM

## 2024-06-19 PROCEDURE — 99215 OFFICE O/P EST HI 40 MIN: CPT | Performed by: NURSE PRACTITIONER

## 2024-06-19 PROCEDURE — G2211 COMPLEX E/M VISIT ADD ON: HCPCS | Performed by: NURSE PRACTITIONER

## 2024-06-19 RX ORDER — AZITHROMYCIN 250 MG/1
TABLET, FILM COATED ORAL
Qty: 6 TABLET | Refills: 0 | Status: SHIPPED | OUTPATIENT
Start: 2024-06-19

## 2024-06-19 RX ORDER — ATOVAQUONE AND PROGUANIL HYDROCHLORIDE 250; 100 MG/1; MG/1
1 TABLET, FILM COATED ORAL DAILY
Qty: 39 TABLET | Refills: 0 | Status: SHIPPED | OUTPATIENT
Start: 2024-06-19

## 2024-06-19 ASSESSMENT — ENCOUNTER SYMPTOMS: BACK PAIN: 1

## 2024-06-19 ASSESSMENT — PAIN SCALES - GENERAL: PAINLEVEL: MILD PAIN (3)

## 2024-06-19 NOTE — PROGRESS NOTES
"  Assessment & Plan     (M79.652) Pain of left thigh  (primary encounter diagnosis)  Comment: likely radiculitis  Plan: He will continue to do home PT exercises.  Discussed getting new running shoes (he only changes once a year).  Discussed referring back to spine specialist to see if he might need a left SI joint injection vs lumbar epidural, but he prefers to wait at this time.     (Z23) Need for prophylactic vaccination against cholera with typhoid-paratyphoid (cholera + TAB) vaccine  Comment: reviewed CDC info  Plan: typhoid (VIVOTIF) CR capsule        Discussed the use and indication of this medication as well as potential side effects.     (Z29.89) Need for malaria prophylaxis  Comment:   Plan: atovaquone-proguanil (MALARONE) 250-100 MG         tablet        Discussed the use and indication of this medication as well as potential side effects.     (A09) Traveler's diarrhea  Comment:   Plan: azithromycin (ZITHROMAX) 250 MG tablet        Discussed the use and indication of this medication as well as potential side effects.         I spent a total of 44 minutes on the day of the visit.   Time spent by me doing chart review, history and exam, documentation and further activities per the note    The longitudinal plan of care for the diagnosis(es)/condition(s) as documented were addressed during this visit. Due to the added complexity in care, I will continue to support Callum in the subsequent management and with ongoing continuity of care.    BMI  Estimated body mass index is 27.85 kg/m  as calculated from the following:    Height as of this encounter: 1.66 m (5' 5.35\").    Weight as of this encounter: 76.7 kg (169 lb 3.2 oz).             aTta Nolen is a 63 year old, presenting for the following health issues:  Back Pain (/On-going since Feb, no injury was exercising, felt a sharp pain in the lower RT back, ignored then went to , gotten better, pain on and off on the LT hamstring, 02/29/2024)    History " "of Present Illness       Reason for visit:  Sciatica follow up    He eats 2-3 servings of fruits and vegetables daily.He consumes 0 sweetened beverage(s) daily.He exercises with enough effort to increase his heart rate 30 to 60 minutes per day.  He exercises with enough effort to increase his heart rate 4 days per week.   He is taking medications regularly.     He has been having intermittent pain in the back of his left thigh for several months.  He thinks it is worse with walking/running.  He does have some tingling, no weakness.  He has a history of low back pain and sciatica.  He did get a right SI injection in March, which helped with his right sciatica.      He will be [place] in two [place] and wants to know if he needs any immunizations.                   Objective    /70 (BP Location: Right arm, Patient Position: Sitting, Cuff Size: Adult Regular)   Pulse 62   Temp 97.5  F (36.4  C) (Temporal)   Resp 20   Ht 1.66 m (5' 5.35\")   Wt 76.7 kg (169 lb 3.2 oz)   SpO2 99%   BMI 27.85 kg/m    Body mass index is 27.85 kg/m .  Physical Exam   GENERAL: alert and no distress  MS: Back inspection: symmetrical, no spinal curvature  Tenderness: no vertebral tenderness  Musculoskeletal: ROM: full  Neuro: Strength: 5/5 lower extremities bilaterally, able to heel walk and toe walk  Sensation: sensation to light touch grossly intact and equal bilaterally  Reflexes: patellar reflex 2/4 bilaterally, achilles reflex 2/4 bilaterally  Straight leg raise: positive left leg  Cross leg raise: negative    PSYCH: mentation appears normal, affect normal/bright            Signed Electronically by: Stephie Lewis NP    "

## 2024-06-21 ENCOUNTER — TRANSFERRED RECORDS (OUTPATIENT)
Dept: HEALTH INFORMATION MANAGEMENT | Facility: CLINIC | Age: 64
End: 2024-06-21
Payer: COMMERCIAL

## 2024-06-22 ENCOUNTER — HEALTH MAINTENANCE LETTER (OUTPATIENT)
Age: 64
End: 2024-06-22

## 2024-07-29 ENCOUNTER — OFFICE VISIT (OUTPATIENT)
Dept: FAMILY MEDICINE | Facility: CLINIC | Age: 64
End: 2024-07-29
Payer: COMMERCIAL

## 2024-07-29 VITALS
BODY MASS INDEX: 26.93 KG/M2 | HEART RATE: 58 BPM | SYSTOLIC BLOOD PRESSURE: 114 MMHG | HEIGHT: 66 IN | WEIGHT: 167.6 LBS | DIASTOLIC BLOOD PRESSURE: 72 MMHG | RESPIRATION RATE: 21 BRPM | OXYGEN SATURATION: 97 % | TEMPERATURE: 97.3 F

## 2024-07-29 DIAGNOSIS — E11.9 TYPE 2 DIABETES MELLITUS WITHOUT COMPLICATION, WITHOUT LONG-TERM CURRENT USE OF INSULIN (H): ICD-10-CM

## 2024-07-29 DIAGNOSIS — Z00.00 ROUTINE GENERAL MEDICAL EXAMINATION AT A HEALTH CARE FACILITY: Primary | ICD-10-CM

## 2024-07-29 LAB
ANION GAP SERPL CALCULATED.3IONS-SCNC: 9 MMOL/L (ref 7–15)
BUN SERPL-MCNC: 12.5 MG/DL (ref 8–23)
CALCIUM SERPL-MCNC: 9.4 MG/DL (ref 8.8–10.4)
CHLORIDE SERPL-SCNC: 107 MMOL/L (ref 98–107)
CHOLEST SERPL-MCNC: 239 MG/DL
CREAT SERPL-MCNC: 0.77 MG/DL (ref 0.67–1.17)
CREAT UR-MCNC: 237 MG/DL
EGFRCR SERPLBLD CKD-EPI 2021: >90 ML/MIN/1.73M2
FASTING STATUS PATIENT QL REPORTED: YES
FASTING STATUS PATIENT QL REPORTED: YES
GLUCOSE SERPL-MCNC: 128 MG/DL (ref 70–99)
HBA1C MFR BLD: 7 % (ref 0–5.6)
HCO3 SERPL-SCNC: 26 MMOL/L (ref 22–29)
HDLC SERPL-MCNC: 60 MG/DL
HIV 1+2 AB+HIV1 P24 AG SERPL QL IA: NONREACTIVE
LDLC SERPL CALC-MCNC: 161 MG/DL
MICROALBUMIN UR-MCNC: 16 MG/L
MICROALBUMIN/CREAT UR: 6.75 MG/G CR (ref 0–17)
NONHDLC SERPL-MCNC: 179 MG/DL
POTASSIUM SERPL-SCNC: 4.5 MMOL/L (ref 3.4–5.3)
PSA SERPL DL<=0.01 NG/ML-MCNC: 0.66 NG/ML (ref 0–4.5)
SODIUM SERPL-SCNC: 142 MMOL/L (ref 135–145)
TRIGL SERPL-MCNC: 92 MG/DL
VIT B12 SERPL-MCNC: 156 PG/ML (ref 232–1245)

## 2024-07-29 PROCEDURE — 36415 COLL VENOUS BLD VENIPUNCTURE: CPT | Performed by: NURSE PRACTITIONER

## 2024-07-29 PROCEDURE — 99213 OFFICE O/P EST LOW 20 MIN: CPT | Mod: 25 | Performed by: NURSE PRACTITIONER

## 2024-07-29 PROCEDURE — 87389 HIV-1 AG W/HIV-1&-2 AB AG IA: CPT | Performed by: NURSE PRACTITIONER

## 2024-07-29 PROCEDURE — 80048 BASIC METABOLIC PNL TOTAL CA: CPT | Performed by: NURSE PRACTITIONER

## 2024-07-29 PROCEDURE — 83036 HEMOGLOBIN GLYCOSYLATED A1C: CPT | Performed by: NURSE PRACTITIONER

## 2024-07-29 PROCEDURE — 80061 LIPID PANEL: CPT | Performed by: NURSE PRACTITIONER

## 2024-07-29 PROCEDURE — 82043 UR ALBUMIN QUANTITATIVE: CPT | Performed by: NURSE PRACTITIONER

## 2024-07-29 PROCEDURE — 82570 ASSAY OF URINE CREATININE: CPT | Performed by: NURSE PRACTITIONER

## 2024-07-29 PROCEDURE — G0103 PSA SCREENING: HCPCS | Performed by: NURSE PRACTITIONER

## 2024-07-29 PROCEDURE — 82607 VITAMIN B-12: CPT | Performed by: NURSE PRACTITIONER

## 2024-07-29 PROCEDURE — 99396 PREV VISIT EST AGE 40-64: CPT | Performed by: NURSE PRACTITIONER

## 2024-07-29 SDOH — HEALTH STABILITY: PHYSICAL HEALTH: ON AVERAGE, HOW MANY MINUTES DO YOU ENGAGE IN EXERCISE AT THIS LEVEL?: 60 MIN

## 2024-07-29 SDOH — HEALTH STABILITY: PHYSICAL HEALTH: ON AVERAGE, HOW MANY DAYS PER WEEK DO YOU ENGAGE IN MODERATE TO STRENUOUS EXERCISE (LIKE A BRISK WALK)?: 7 DAYS

## 2024-07-29 ASSESSMENT — SOCIAL DETERMINANTS OF HEALTH (SDOH): HOW OFTEN DO YOU GET TOGETHER WITH FRIENDS OR RELATIVES?: PATIENT DECLINED

## 2024-07-29 ASSESSMENT — PAIN SCALES - GENERAL: PAINLEVEL: MILD PAIN (2)

## 2024-07-29 NOTE — PROGRESS NOTES
"Preventive Care Visit  [facility]  Stephie Lewis, NP, Nurse Practitioner - Family  Jul 29, 2024      Assessment & Plan     (Z00.00) Routine general medical examination at a health care facility  (primary encounter diagnosis)  Comment:   Plan: PSA, screen, HIV Antigen Antibody Combo        Declines vaccinations today.     (E11.9) Type 2 diabetes mellitus without complication, without long-term current use of insulin (H)  Comment: at goal  Plan: HEMOGLOBIN A1C, Vitamin B12, Albumin Random         Urine Quantitative with Creat Ratio, Basic         metabolic panel  (Ca, Cl, CO2, Creat, Gluc, K,         Na, BUN), Lipid panel reflex to direct LDL         Fasting        He declines a statin.              BMI  Estimated body mass index is 27.42 kg/m  as calculated from the following:    Height as of this encounter: 1.665 m (5' 5.55\").    Weight as of this encounter: 76 kg (167 lb 9.6 oz).       Counseling  Appropriate preventive services were addressed with this patient via screening, questionnaire, or discussion as appropriate for fall prevention, nutrition, physical activity, Tobacco-use cessation, weight loss and cognition.  Checklist reviewing preventive services available has been given to the patient.  Reviewed patient's diet, addressing concerns and/or questions.   The patient was instructed to see the dentist every 6 months.           Tata Nolen is a 63 year old, presenting for the following:  Physical (/Discuss taking B12 and Black Seed Oil gummies) and Arthritis (Bilateral hip, knee, and side pain, bothering the patient this weekend, more in movement.)         Health Care Directive  Patient does not have a Health Care Directive or Living Will: Discussed advance care planning with patient; information given to patient to review.    Arthritis              7/29/2024   General Health   How would you rate your overall physical health? Excellent   Feel stress (tense, anxious, or " unable to sleep) Not at all            7/29/2024   Nutrition   Three or more servings of calcium each day? Yes   Diet: Vegetarian/vegan   How many servings of fruit and vegetables per day? (!) 2-3   How many sweetened beverages each day? 0-1            7/29/2024   Exercise   Days per week of moderate/strenous exercise 7 days   Average minutes spent exercising at this level 60 min            7/29/2024   Social Factors   Frequency of gathering with friends or relatives Patient declined   Worry food won't last until get money to buy more No   Food not last or not have enough money for food? No   Do you have housing? (Housing is defined as stable permanent housing and does not include staying ouside in a car, in a tent, in an abandoned building, in an overnight shelter, or couch-surfing.) No   Are you worried about losing your housing? No   Lack of transportation? No   Unable to get utilities (heat,electricity)? No   Want help with housing or utility concern? No      (!) HOUSING CONCERN PRESENT      7/29/2024   Fall Risk   Fallen 2 or more times in the past year? Yes   Trouble with walking or balance? No   Gait Speed Test (Document in seconds) 2.72   Gait Speed Test Interpretation Less than or equal to 5.00 seconds - PASS             7/29/2024   Dental   Dentist two times every year? (!) NO            7/29/2024   TB Screening   Were you born outside of the [place]? No                  7/29/2024   Substance Use   Alcohol more than 3/day or more than 7/wk No   Do you use any other substances recreationally? (!) OTHER        Social History     Tobacco Use    Smoking status: Never    Smokeless tobacco: Never   Vaping Use    Vaping status: Never Used   Substance Use Topics    Alcohol use: Yes     Comment: 3 times a month     Drug use: No           7/29/2024   STI Screening   New sexual partner(s) since last STI/HIV test? No      Last PSA:   PSA   Date Value Ref Range Status   02/22/2021 0.49 0 - 4 ug/L Final     Comment:      "Assay Method:  Chemiluminescence using Siemens Vista analyzer     Prostate Specific Antigen Screen   Date Value Ref Range Status   11/01/2023 0.69 0.00 - 4.50 ng/mL Final   02/28/2022 0.75 0.00 - 4.00 ug/L Final     ASCVD Risk   The 10-year ASCVD risk score (Curt COLE, et al., 2019) is: 12.7%    Values used to calculate the score:      Age: 63 years      Sex: Male      Is Non- : Yes      Diabetic: Yes      Tobacco smoker: No      Systolic Blood Pressure: 114 mmHg      Is BP treated: No      HDL Cholesterol: 55 mg/dL      Total Cholesterol: 212 mg/dL           Reviewed and updated as needed this visit by Provider                             Objective    Exam  /72 (BP Location: Right arm, Patient Position: Sitting, Cuff Size: Adult Regular)   Pulse 58   Temp 97.3  F (36.3  C) (Temporal)   Resp 21   Ht 1.665 m (5' 5.55\")   Wt 76 kg (167 lb 9.6 oz)   SpO2 97%   BMI 27.42 kg/m     Estimated body mass index is 27.42 kg/m  as calculated from the following:    Height as of this encounter: 1.665 m (5' 5.55\").    Weight as of this encounter: 76 kg (167 lb 9.6 oz).    Physical Exam  GENERAL: alert and no distress  EYES: Eyes grossly normal to inspection, PERRL and conjunctivae and sclerae normal  HENT: ear canals and TM's normal, nose and mouth without ulcers or lesions  NECK: no adenopathy, no asymmetry, masses, or scars  RESP: lungs clear to auscultation - no rales, rhonchi or wheezes  CV: regular rate and rhythm, normal S1 S2, no S3 or S4, no murmur, click or rub, no peripheral edema  ABDOMEN: soft, nontender, no hepatosplenomegaly, no masses and bowel sounds normal  MS: no gross musculoskeletal defects noted, no edema  SKIN: no suspicious lesions or rashes  NEURO: Normal strength and tone, mentation intact and speech normal  PSYCH: mentation appears normal, affect normal/bright  Diabetic foot exam: normal DP and PT pulses, no trophic changes or ulcerative lesions, and normal " sensory exam        Signed Electronically by: Stephie Lewis, NP

## 2024-07-29 NOTE — PATIENT INSTRUCTIONS
Patient Education   Preventive Care Advice   This is general advice given by our system to help you stay healthy. However, your care team may have specific advice just for you. Please talk to your care team about your preventive care needs.  Nutrition  Eat 5 or more servings of fruits and vegetables each day.  Try wheat bread, brown rice and whole grain pasta (instead of white bread, rice, and pasta).  Get enough calcium and vitamin D. Check the label on foods and aim for 100% of the RDA (recommended daily allowance).  Lifestyle  Exercise at least 150 minutes each week  (30 minutes a day, 5 days a week).  Do muscle strengthening activities 2 days a week. These help control your weight and prevent disease.  No smoking.  Wear sunscreen to prevent skin cancer.  Have a dental exam and cleaning every 6 months.  Yearly exams  See your health care team every year to talk about:  Any changes in your health.  Any medicines your care team has prescribed.  Preventive care, family planning, and ways to prevent chronic diseases.  Shots (vaccines)   HPV shots (up to age 26), if you've never had them before.  Hepatitis B shots (up to age 59), if you've never had them before.  COVID-19 shot: Get this shot when it's due.  Flu shot: Get a flu shot every year.  Tetanus shot: Get a tetanus shot every 10 years.  Pneumococcal, hepatitis A, and RSV shots: Ask your care team if you need these based on your risk.  Shingles shot (for age 50 and up)  General health tests  Diabetes screening:  Starting at age 35, Get screened for diabetes at least every 3 years.  If you are younger than age 35, ask your care team if you should be screened for diabetes.  Cholesterol test: At age 39, start having a cholesterol test every 5 years, or more often if advised.  Bone density scan (DEXA): At age 50, ask your care team if you should have this scan for osteoporosis (brittle bones).  Hepatitis C: Get tested at least once in your life.  STIs (sexually  transmitted infections)  Before age 24: Ask your care team if you should be screened for STIs.  After age 24: Get screened for STIs if you're at risk. You are at risk for STIs (including HIV) if:  You are sexually active with more than one person.  You don't use condoms every time.  You or a partner was diagnosed with a sexually transmitted infection.  If you are at risk for HIV, ask about PrEP medicine to prevent HIV.  Get tested for HIV at least once in your life, whether you are at risk for HIV or not.  Cancer screening tests  Cervical cancer screening: If you have a cervix, begin getting regular cervical cancer screening tests starting at age 21.  Breast cancer scan (mammogram): If you've ever had breasts, begin having regular mammograms starting at age 40. This is a scan to check for breast cancer.  Colon cancer screening: It is important to start screening for colon cancer at age 45.  Have a colonoscopy test every 10 years (or more often if you're at risk) Or, ask your provider about stool tests like a FIT test every year or Cologuard test every 3 years.  To learn more about your testing options, visit:   .  For help making a decision, visit:   https://bit.ly/am32794.  Prostate cancer screening test: If you have a prostate, ask your care team if a prostate cancer screening test (PSA) at age 55 is right for you.  Lung cancer screening: If you are a current or former smoker ages 50 to 80, ask your care team if ongoing lung cancer screenings are right for you.  For informational purposes only. Not to replace the advice of your health care provider. Copyright   2023 [facility]. All rights reserved. Clinically reviewed by the [facility] Transitions Program. [employer] [id number] - REV 01/24.  Preventing Falls: Care Instructions  Injuries and health problems such as trouble walking or poor eyesight can increase your risk of falling. So can some medicines. But there are things you can do to help  "prevent falls. You can exercise to get stronger. You can also arrange your home to make it safer.    Talk to your doctor about the medicines you take. Ask if any of them increase the risk of falls and whether they can be changed or stopped.   Try to exercise regularly. It can help improve your strength and balance. This can help lower your risk of falling.     Practice fall safety and prevention.    Wear low-heeled shoes that fit well and give your feet good support. Talk to your doctor if you have foot problems that make this hard.  Carry a cellphone or wear a medical alert device that you can use to call for help.  Use stepladders instead of chairs to reach high objects. Don't climb if you're at risk for falls. Ask for help, if needed.  Wear the correct eyeglasses, if you need them.    Make your home safer.    Remove rugs, cords, clutter, and furniture from walkways.  Keep your house well lit. Use night-lights in hallways and bathrooms.  Install and use sturdy handrails on stairways.  Wear nonskid footwear, even inside. Don't walk barefoot or in socks without shoes.    Be safe outside.    Use handrails, curb cuts, and ramps whenever possible.  Keep your hands free by using a shoulder bag or backpack.  Try to walk in well-lit areas. Watch out for uneven ground, changes in pavement, and debris.  Be careful in the winter. Walk on the grass or gravel when sidewalks are slippery. Use de-icer on steps and walkways. Add non-slip devices to shoes.    Put grab bars and nonskid mats in your shower or tub and near the toilet. Try to use a shower chair or bath bench when bathing.   Get into a tub or shower by putting in your weaker leg first. Get out with your strong side first. Have a phone or medical alert device in the bathroom with you.   Where can you learn more?  Go to https://www.[employer].net/patiented  Enter G117 in the search box to learn more about \"Preventing Falls: Care Instructions.\"  Current as of: July 17, " 2023               Content Version: 14.0    1598-4852 [employer].   Care instructions adapted under license by your healthcare professional. If you have questions about a medical condition or this instruction, always ask your healthcare professional. [employer] disclaims any warranty or liability for your use of this information.      Substance Use Disorder: Care Instructions  Overview     You can improve your life and health by stopping your use of alcohol or drugs. When you don't drink or use drugs, you may feel and sleep better. You may get along better with your family, friends, and coworkers. There are medicines and programs that can help with substance use disorder.  How can you care for yourself at home?  Here are some ways to help you stay sober and prevent relapse.  If you have been given medicine to help keep you sober or reduce your cravings, be sure to take it exactly as prescribed.  Talk to your doctor about programs that can help you stop using drugs or drinking alcohol.  Do not keep alcohol or drugs in your home.  Plan ahead. Think about what you'll say if other people ask you to drink or use drugs. Try not to spend time with people who drink or use drugs.  Use the time and money spent on drinking or drugs to do something that's important to you.  Preventing a relapse  Have a plan to deal with relapse. Learn to recognize changes in your thinking that lead you to drink or use drugs. Get help before you start to drink or use drugs again.  Try to stay away from situations, friends, or places that may lead you to drink or use drugs.  If you feel the need to drink alcohol or use drugs again, seek help right away. Call a trusted friend or family member. Some people get support from organizations such as Narcotics Anonymous or [employer] or from treatment facilities.  If you relapse, get help as soon as you can. Some people make a plan with another person that outlines what  they want that person to do for them if they relapse. The plan usually includes how to handle the relapse and who to notify in case of relapse.  Don't give up. Remember that a relapse doesn't mean that you have failed. Use the experience to learn the triggers that lead you to drink or use drugs. Then quit again. Recovery is a lifelong process. Many people have several relapses before they are able to quit for good.  Follow-up care is a key part of your treatment and safety. Be sure to make and go to all appointments, and call your doctor if you are having problems. It's also a good idea to know your test results and keep a list of the medicines you take.  When should you call for help?   Call 911  anytime you think you may need emergency care. For example, call if you or someone else:    Has overdosed or has withdrawal signs. Be sure to tell the emergency workers that you are or someone else is using or trying to quit using drugs. Overdose or withdrawal signs may include:  Losing consciousness.  Seizure.  Seeing or hearing things that aren't there (hallucinations).     Is thinking or talking about suicide or harming others.   Where to get help 24 hours a day, 7 days a week   If you or someone you know talks about suicide, self-harm, a mental health crisis, a substance use crisis, or any other kind of emotional distress, get help right away. You can:    Call the Suicide and Crisis Lifeline at 262.     Call 6-014-060-QWVQ (1-697.273.2536).     Text HOME to 819772 to access the Crisis Text Line.   Consider saving these numbers in your phone.  Go to [employer]comline.org for more information or to chat online.  Call your doctor now or seek immediate medical care if:    You are having withdrawal symptoms. These may include nausea or vomiting, sweating, shakiness, and anxiety.   Watch closely for changes in your health, and be sure to contact your doctor if:    You have a relapse.     You need more help or support to stop.  "  Where can you learn more?  Go to https://www.[employer].net/patiented  Enter H573 in the search box to learn more about \"Substance Use Disorder: Care Instructions.\"  Current as of: November 15, 2023               Content Version: 14.0    7095-2829 [employer].   Care instructions adapted under license by your healthcare professional. If you have questions about a medical condition or this instruction, always ask your healthcare professional. [employer] disclaims any warranty or liability for your use of this information.         "

## 2024-07-30 ENCOUNTER — TELEPHONE (OUTPATIENT)
Dept: PHYSICAL MEDICINE AND REHAB | Facility: CLINIC | Age: 64
End: 2024-07-30
Payer: COMMERCIAL

## 2024-07-30 DIAGNOSIS — M46.1 SACROILIITIS (H): Primary | ICD-10-CM

## 2024-07-30 DIAGNOSIS — M53.3 SACROILIAC JOINT PAIN: ICD-10-CM

## 2024-07-30 NOTE — TELEPHONE ENCOUNTER
"Upon chart review pt had B/L SI joint injections last on 3/6/24. Per last OV note, \"more than 50% pain relief after right SI injection. We talked about the potential for repeat SI injections in the future.  For now, he will continue physical therapy and complete full course and follow-up as needed.\"    Routing to covering provider in Deborah's absence to advise.   "

## 2024-07-30 NOTE — TELEPHONE ENCOUNTER
Date: July 30, 2024    Provider: JACQUELINE     Provider/Other: Deborah Farley    Reason for out-going call: PATIENT INFORMATION & QUESTIONS      Detailed message: Contacted patient to reschedule appt w/ Deborah Farley on 8/9/24 due to provider being out of clinic. Patient has been rescheduled w/ Taty Rock on 8/13/24 @ 8:00 am. Per patient he was told by Deborah at his last appointment that if he has any back issues his next appointment would be another injection. There are no injection orders placed, patient is looking to get injection in before he goes out of town on 8/15/24. Please review patient chart, thank you!

## 2024-07-30 NOTE — TELEPHONE ENCOUNTER
Error: patient only had RIGHT SI joint injection on 3/6/24.     Called patient. He noted he received 75% relief of his symptoms until last week when his same exact right upper buttock/lower back pain returned. He states it is in the exact same spot as before his last injection.   Informed him will update covering provider and will call back with response.

## 2024-07-30 NOTE — TELEPHONE ENCOUNTER
Please call patient to discuss.  Please find out how much relief he had and for how long, when pain returned, where pain is located, if pain feels the same as prior pain, etc.

## 2024-07-30 NOTE — TELEPHONE ENCOUNTER
Call placed to pt. Informed him OK for repeat injection. Transferred pt to scheduling to cancel follow-up appt and schedule injection.

## 2024-08-13 ENCOUNTER — TELEPHONE (OUTPATIENT)
Dept: NEUROSURGERY | Facility: CLINIC | Age: 64
End: 2024-08-13

## 2024-08-13 NOTE — TELEPHONE ENCOUNTER
Pt is taking medications for Typhoid VIVOTIS  for his upcoming trip. The medication is causing a fever. The trip is on 08/18.  He's scheduled for an injection w/Dr. Myers on 08/14. He's calling to make sure it's safe to proceed with the injection.     Please contact Pt or his son Rudy, Rudy is easier to get a hold of. Callum with be at work. Ok to leave a detailed message, but would prefer a conversation.

## 2024-08-13 NOTE — TELEPHONE ENCOUNTER
Discussed injection problem with provider. Provider states that he would be unable to complete the injection due to the oral Typhoid VIVOTIS. Called and informed patient's son of information. He discussed with patient and they agreed with this plan and were transferred to the  to be rescheduled.

## 2024-11-09 ENCOUNTER — HEALTH MAINTENANCE LETTER (OUTPATIENT)
Age: 64
End: 2024-11-09

## 2025-03-02 ENCOUNTER — HEALTH MAINTENANCE LETTER (OUTPATIENT)
Age: 65
End: 2025-03-02

## 2025-03-31 ENCOUNTER — OFFICE VISIT (OUTPATIENT)
Dept: FAMILY MEDICINE | Facility: CLINIC | Age: 65
End: 2025-03-31

## 2025-03-31 VITALS
BODY MASS INDEX: 27.8 KG/M2 | WEIGHT: 173 LBS | HEART RATE: 72 BPM | OXYGEN SATURATION: 99 % | RESPIRATION RATE: 16 BRPM | DIASTOLIC BLOOD PRESSURE: 73 MMHG | SYSTOLIC BLOOD PRESSURE: 128 MMHG | TEMPERATURE: 97.5 F | HEIGHT: 66 IN

## 2025-03-31 DIAGNOSIS — M54.42 BILATERAL LOW BACK PAIN WITH LEFT-SIDED SCIATICA, UNSPECIFIED CHRONICITY: Primary | ICD-10-CM

## 2025-03-31 DIAGNOSIS — E11.9 TYPE 2 DIABETES MELLITUS WITHOUT COMPLICATION, WITHOUT LONG-TERM CURRENT USE OF INSULIN (H): ICD-10-CM

## 2025-03-31 PROCEDURE — 99214 OFFICE O/P EST MOD 30 MIN: CPT | Performed by: NURSE PRACTITIONER

## 2025-03-31 PROCEDURE — G2211 COMPLEX E/M VISIT ADD ON: HCPCS | Performed by: NURSE PRACTITIONER

## 2025-03-31 ASSESSMENT — PAIN SCALES - GENERAL: PAINLEVEL_OUTOF10: MODERATE PAIN (5)

## 2025-03-31 NOTE — PROGRESS NOTES
"  Assessment & Plan     (M54.42) Bilateral low back pain with left-sided sciatica, unspecified chronicity  (primary encounter diagnosis)  Comment:   Plan: Spine  Referral        Will refer back to spine specialist to evaluate for possible injection.     (E11.9) Type 2 diabetes mellitus without complication, without long-term current use of insulin (H)  Comment:   Plan: He declines labs today, will follow up for his annual exam.      The longitudinal plan of care for the diagnosis(es)/condition(s) as documented were addressed during this visit. Due to the added complexity in care, I will continue to support Callum in the subsequent management and with ongoing continuity of care.      BMI  Estimated body mass index is 28.35 kg/m  as calculated from the following:    Height as of this encounter: 1.664 m (5' 5.5\").    Weight as of this encounter: 78.5 kg (173 lb).             Tata Nolen is a 64 year old, presenting for the following health issues:  Musculoskeletal Problem (Pain when driving and sitting for a long time/Seen previously for pain on right side, treated/New and same pain on left side now)        3/31/2025    10:43 AM   Additional Questions   Roomed by Denia MANTILLA     Musculoskeletal Problem    History of Present Illness       Reason for visit:  Leg pain       He continues to have left leg pain.  He describes pain in the back of his thigh to his knee, worse with sitting or driving a vehicle. He does have mild low back pain.  He did have similar symptoms in his right leg in 2020, was treated with PT and then had two epidural injections, which were not helpful.  He did get relief once he had a SI joint injection about a year ago.  He has been doing home PT exercises very consistently.  He denies any paresthesias, weakness, B/B problems.  His last lumbar MRI was about a year ago.  It did show bilateral L3 nerve impingement.                   Objective    /73 (BP Location: Right arm, Patient " "Position: Sitting, Cuff Size: Adult Regular)   Pulse 72   Temp 97.5  F (36.4  C) (Temporal)   Resp 16   Ht 1.664 m (5' 5.5\")   Wt 78.5 kg (173 lb)   SpO2 99%   BMI 28.35 kg/m    Body mass index is 28.35 kg/m .  Physical Exam   GENERAL: alert and no distress  MS: Back inspection: symmetrical, no spinal curvature  Tenderness: no vertebral tenderness  Musculoskeletal: ROM: full  Neuro: Strength: 5/5 lower extremities bilaterally, able to heel walk and toe walk  Sensation: sensation to light touch grossly intact and equal bilaterally  Reflexes: patellar reflex 2/4 bilaterally, achilles reflex 2/4 bilaterally  Straight leg raise: negative  Cross leg raise: negative  PSYCH: mentation appears normal, affect normal/bright            Signed Electronically by: Stephie Lewis NP    "

## 2025-06-01 ENCOUNTER — TRANSFERRED RECORDS (OUTPATIENT)
Dept: MULTI SPECIALTY CLINIC | Facility: CLINIC | Age: 65
End: 2025-06-01
Payer: COMMERCIAL

## 2025-06-01 LAB — RETINOPATHY: NORMAL

## 2025-06-21 ENCOUNTER — HEALTH MAINTENANCE LETTER (OUTPATIENT)
Age: 65
End: 2025-06-21

## 2025-07-01 ENCOUNTER — RESULTS FOLLOW-UP (OUTPATIENT)
Dept: FAMILY MEDICINE | Facility: CLINIC | Age: 65
End: 2025-07-01

## 2025-07-01 ENCOUNTER — OFFICE VISIT (OUTPATIENT)
Dept: FAMILY MEDICINE | Facility: CLINIC | Age: 65
End: 2025-07-01
Payer: COMMERCIAL

## 2025-07-01 VITALS
RESPIRATION RATE: 14 BRPM | DIASTOLIC BLOOD PRESSURE: 78 MMHG | HEART RATE: 68 BPM | BODY MASS INDEX: 27.32 KG/M2 | SYSTOLIC BLOOD PRESSURE: 118 MMHG | HEIGHT: 66 IN | OXYGEN SATURATION: 97 % | WEIGHT: 170 LBS | TEMPERATURE: 97.2 F

## 2025-07-01 DIAGNOSIS — E11.9 TYPE 2 DIABETES MELLITUS WITHOUT COMPLICATION, WITHOUT LONG-TERM CURRENT USE OF INSULIN (H): ICD-10-CM

## 2025-07-01 DIAGNOSIS — E53.8 VITAMIN B12 DEFICIENCY (NON ANEMIC): ICD-10-CM

## 2025-07-01 DIAGNOSIS — E55.9 VITAMIN D DEFICIENCY: ICD-10-CM

## 2025-07-01 DIAGNOSIS — E78.2 MIXED HYPERLIPIDEMIA: Primary | ICD-10-CM

## 2025-07-01 DIAGNOSIS — Z00.00 ROUTINE GENERAL MEDICAL EXAMINATION AT A HEALTH CARE FACILITY: Primary | ICD-10-CM

## 2025-07-01 DIAGNOSIS — Z11.3 SCREEN FOR STD (SEXUALLY TRANSMITTED DISEASE): ICD-10-CM

## 2025-07-01 LAB
ANION GAP SERPL CALCULATED.3IONS-SCNC: 9 MMOL/L (ref 7–15)
BUN SERPL-MCNC: 10.9 MG/DL (ref 8–23)
CALCIUM SERPL-MCNC: 9.4 MG/DL (ref 8.8–10.4)
CHLORIDE SERPL-SCNC: 106 MMOL/L (ref 98–107)
CHOLEST SERPL-MCNC: 254 MG/DL
CREAT SERPL-MCNC: 0.77 MG/DL (ref 0.67–1.17)
CREAT UR-MCNC: 243 MG/DL
EGFRCR SERPLBLD CKD-EPI 2021: >90 ML/MIN/1.73M2
ERYTHROCYTE [DISTWIDTH] IN BLOOD BY AUTOMATED COUNT: 11.8 % (ref 10–15)
EST. AVERAGE GLUCOSE BLD GHB EST-MCNC: 177 MG/DL
FASTING STATUS PATIENT QL REPORTED: YES
FASTING STATUS PATIENT QL REPORTED: YES
GLUCOSE SERPL-MCNC: 158 MG/DL (ref 70–99)
HBA1C MFR BLD: 7.8 % (ref 0–5.6)
HCO3 SERPL-SCNC: 25 MMOL/L (ref 22–29)
HCT VFR BLD AUTO: 43.9 % (ref 40–53)
HDLC SERPL-MCNC: 57 MG/DL
HGB BLD-MCNC: 14.8 G/DL (ref 13.3–17.7)
HIV 1+2 AB+HIV1 P24 AG SERPL QL IA: NONREACTIVE
LDLC SERPL CALC-MCNC: 177 MG/DL
MCH RBC QN AUTO: 30 PG (ref 26.5–33)
MCHC RBC AUTO-ENTMCNC: 33.7 G/DL (ref 31.5–36.5)
MCV RBC AUTO: 89 FL (ref 78–100)
MICROALBUMIN UR-MCNC: 21.2 MG/L
MICROALBUMIN/CREAT UR: 8.72 MG/G CR (ref 0–17)
NONHDLC SERPL-MCNC: 197 MG/DL
PLATELET # BLD AUTO: 263 10E3/UL (ref 150–450)
POTASSIUM SERPL-SCNC: 4.2 MMOL/L (ref 3.4–5.3)
PSA SERPL DL<=0.01 NG/ML-MCNC: 0.72 NG/ML (ref 0–4.5)
RBC # BLD AUTO: 4.93 10E6/UL (ref 4.4–5.9)
SODIUM SERPL-SCNC: 140 MMOL/L (ref 135–145)
TRIGL SERPL-MCNC: 99 MG/DL
VIT B12 SERPL-MCNC: 992 PG/ML (ref 232–1245)
VIT D+METAB SERPL-MCNC: 58 NG/ML (ref 20–50)
WBC # BLD AUTO: 4.8 10E3/UL (ref 4–11)

## 2025-07-01 PROCEDURE — 80048 BASIC METABOLIC PNL TOTAL CA: CPT | Performed by: NURSE PRACTITIONER

## 2025-07-01 PROCEDURE — 3074F SYST BP LT 130 MM HG: CPT | Performed by: NURSE PRACTITIONER

## 2025-07-01 PROCEDURE — 80061 LIPID PANEL: CPT | Performed by: NURSE PRACTITIONER

## 2025-07-01 PROCEDURE — 3051F HG A1C>EQUAL 7.0%<8.0%: CPT | Performed by: NURSE PRACTITIONER

## 2025-07-01 PROCEDURE — G0103 PSA SCREENING: HCPCS | Performed by: NURSE PRACTITIONER

## 2025-07-01 PROCEDURE — 82570 ASSAY OF URINE CREATININE: CPT | Performed by: NURSE PRACTITIONER

## 2025-07-01 PROCEDURE — 99214 OFFICE O/P EST MOD 30 MIN: CPT | Mod: 25 | Performed by: NURSE PRACTITIONER

## 2025-07-01 PROCEDURE — 3078F DIAST BP <80 MM HG: CPT | Performed by: NURSE PRACTITIONER

## 2025-07-01 PROCEDURE — 87389 HIV-1 AG W/HIV-1&-2 AB AG IA: CPT | Performed by: NURSE PRACTITIONER

## 2025-07-01 PROCEDURE — G2211 COMPLEX E/M VISIT ADD ON: HCPCS | Performed by: NURSE PRACTITIONER

## 2025-07-01 PROCEDURE — 85027 COMPLETE CBC AUTOMATED: CPT | Performed by: NURSE PRACTITIONER

## 2025-07-01 PROCEDURE — 36415 COLL VENOUS BLD VENIPUNCTURE: CPT | Performed by: NURSE PRACTITIONER

## 2025-07-01 PROCEDURE — 82306 VITAMIN D 25 HYDROXY: CPT | Performed by: NURSE PRACTITIONER

## 2025-07-01 PROCEDURE — 1126F AMNT PAIN NOTED NONE PRSNT: CPT | Performed by: NURSE PRACTITIONER

## 2025-07-01 PROCEDURE — 82043 UR ALBUMIN QUANTITATIVE: CPT | Performed by: NURSE PRACTITIONER

## 2025-07-01 PROCEDURE — 83036 HEMOGLOBIN GLYCOSYLATED A1C: CPT | Performed by: NURSE PRACTITIONER

## 2025-07-01 PROCEDURE — 99396 PREV VISIT EST AGE 40-64: CPT | Performed by: NURSE PRACTITIONER

## 2025-07-01 PROCEDURE — 82607 VITAMIN B-12: CPT | Performed by: NURSE PRACTITIONER

## 2025-07-01 RX ORDER — LANOLIN ALCOHOL/MO/W.PET/CERES
1000 CREAM (GRAM) TOPICAL DAILY
COMMUNITY
Start: 2025-07-01

## 2025-07-01 RX ORDER — METFORMIN HYDROCHLORIDE 500 MG/1
500 TABLET, EXTENDED RELEASE ORAL
Qty: 90 TABLET | Refills: 1 | Status: SHIPPED | OUTPATIENT
Start: 2025-07-01

## 2025-07-01 SDOH — HEALTH STABILITY: PHYSICAL HEALTH: ON AVERAGE, HOW MANY MINUTES DO YOU ENGAGE IN EXERCISE AT THIS LEVEL?: 60 MIN

## 2025-07-01 SDOH — HEALTH STABILITY: PHYSICAL HEALTH: ON AVERAGE, HOW MANY DAYS PER WEEK DO YOU ENGAGE IN MODERATE TO STRENUOUS EXERCISE (LIKE A BRISK WALK)?: 7 DAYS

## 2025-07-01 ASSESSMENT — PAIN SCALES - GENERAL: PAINLEVEL_OUTOF10: NO PAIN (0)

## 2025-07-01 ASSESSMENT — SOCIAL DETERMINANTS OF HEALTH (SDOH): HOW OFTEN DO YOU GET TOGETHER WITH FRIENDS OR RELATIVES?: PATIENT DECLINED

## 2025-07-01 NOTE — PROGRESS NOTES
"Preventive Care Visit  [facility]  Stephie Lewis, NP, Nurse Practitioner - Family  Jul 1, 2025      Assessment & Plan     (Z00.00) Routine general medical examination at a health care facility  (primary encounter diagnosis)  Comment:   Plan: PSA, screen            (E11.9) Type 2 diabetes mellitus without complication, without long-term current use of insulin (H)  Comment: not at goal  Plan: HEMOGLOBIN A1C, BASIC METABOLIC PANEL, Albumin         Random Urine Quantitative with Creat Ratio,         Lipid panel reflex to direct LDL Fasting        Will start Metformin.  Discussed the use and indication of this medication as well as potential side effects.  Follow up in 3 months.   He is finally willing to start a statin if his cholesterol is still elevated.  Once I get his results back, I will send in rosuvastatin.     (E53.8) Vitamin B12 deficiency (non anemic)  Comment:   Plan: Vitamin B12, CBC with platelets            (E55.9) Vitamin D deficiency  Comment:   Plan: Vitamin D Deficiency            (Z11.3) Screen for STD (sexually transmitted disease)  Comment:   Plan: HIV Antigen Antibody Combo            The longitudinal plan of care for the diagnosis(es)/condition(s) as documented were addressed during this visit. Due to the added complexity in care, I will continue to support Callum in the subsequent management and with ongoing continuity of care.        BMI  Estimated body mass index is 27.86 kg/m  as calculated from the following:    Height as of this encounter: 1.664 m (5' 5.5\").    Weight as of this encounter: 77.1 kg (170 lb).     Reviewed preventive health counseling, as reflected in patient instructions  Counseling  Appropriate preventive services were addressed with this patient via screening, questionnaire, or discussion as appropriate for fall prevention, nutrition, physical activity, Tobacco-use cessation, social engagement, weight loss and cognition.  Checklist reviewing " preventive services available has been given to the patient.  Reviewed patient's diet, addressing concerns and/or questions.   The patient was instructed to see the dentist every 6 months.             Tata Nolen is a 64 year old, presenting for the following:  Physical        7/1/2025     8:18 AM   Additional Questions   Roomed by Shay Cunha   Accompanied by Self         7/1/2025     8:18 AM   Patient Reported Additional Medications   Patient reports taking the following new medications B-12          HPI  He continues to have low back pain and left sciatica.  He is now having a new pain left anterior knee.             Advance Care Planning    Discussed advance care planning with patient; informed AVS has link to [facility].        7/1/2025   General Health   How would you rate your overall physical health? Good   Feel stress (tense, anxious, or unable to sleep) Not at all         7/1/2025   Nutrition   Three or more servings of calcium each day? (!) I DON'T KNOW   Diet: Vegetarian/vegan    I don't know   How many servings of fruit and vegetables per day? (!) 2-3   How many sweetened beverages each day? 0-1       Multiple values from one day are sorted in reverse-chronological order         7/1/2025   Exercise   Days per week of moderate/strenous exercise 7 days   Average minutes spent exercising at this level 60 min         7/1/2025   Social Factors   Frequency of gathering with friends or relatives Patient declined   Worry food won't last until get money to buy more No   Food not last or not have enough money for food? No   Do you have housing? (Housing is defined as stable permanent housing and does not include staying outside in a car, in a tent, in an abandoned building, in an overnight shelter, or couch-surfing.) No   Are you worried about losing your housing? No   Lack of transportation? No   Unable to get utilities (heat,electricity)? No   Want help with housing or utility concern? No   (!)  HOUSING CONCERN PRESENT      7/1/2025   Fall Risk   Fallen 2 or more times in the past year? No   Trouble with walking or balance? No          7/1/2025   Dental   Dentist two times every year? (!) NO           Today's PHQ-2 Score:       3/31/2025    10:32 AM   PHQ-2 ( 1999 [employer])   Q1: Little interest or pleasure in doing things 0   Q2: Feeling down, depressed or hopeless 0   PHQ-2 Score 0    Q1: Little interest or pleasure in doing things Not at all   Q2: Feeling down, depressed or hopeless Not at all   PHQ-2 Score 0       Patient-reported         7/1/2025   Substance Use   Alcohol more than 3/day or more than 7/wk Not Applicable   Do you use any other substances recreationally? No     Social History     Tobacco Use    Smoking status: Never    Smokeless tobacco: Never   Vaping Use    Vaping status: Never Used   Substance Use Topics    Alcohol use: Yes     Comment: 3 times a month     Drug use: No           7/1/2025   STI Screening   New sexual partner(s) since last STI/HIV test? No   Last PSA:   PSA   Date Value Ref Range Status   02/22/2021 0.49 0 - 4 ug/L Final     Comment:     Assay Method:  Chemiluminescence using Siemens Vista analyzer     Prostate Specific Antigen Screen   Date Value Ref Range Status   07/29/2024 0.66 0.00 - 4.50 ng/mL Final   02/28/2022 0.75 0.00 - 4.00 ug/L Final     ASCVD Risk   The 10-year ASCVD risk score (Curt COLE, et al., 2019) is: 14.1%    Values used to calculate the score:      Age: 64 years      Sex: Male      Is Non- : Yes      Diabetic: Yes      Tobacco smoker: No      Systolic Blood Pressure: 118 mmHg      Is BP treated: No      HDL Cholesterol: 60 mg/dL      Total Cholesterol: 239 mg/dL           Reviewed and updated as needed this visit by Provider                             Objective    Exam  /78 (BP Location: Right arm, Patient Position: Sitting, Cuff Size: Adult Regular)   Pulse 68   Temp 97.2  F (36.2  C) (Temporal)   Resp 14    "Ht 1.664 m (5' 5.5\")   Wt 77.1 kg (170 lb)   SpO2 97%   BMI 27.86 kg/m     Estimated body mass index is 27.86 kg/m  as calculated from the following:    Height as of this encounter: 1.664 m (5' 5.5\").    Weight as of this encounter: 77.1 kg (170 lb).    Physical Exam  GENERAL: alert and no distress  EYES: Eyes grossly normal to inspection, PERRL and conjunctivae and sclerae normal  HENT: ear canals and TM's normal, nose and mouth without ulcers or lesions  NECK: no adenopathy, no asymmetry, masses, or scars  RESP: lungs clear to auscultation - no rales, rhonchi or wheezes  CV: regular rate and rhythm, normal S1 S2, no S3 or S4, no murmur, click or rub, no peripheral edema  ABDOMEN: soft, nontender, no hepatosplenomegaly, no masses and bowel sounds normal  MS: no gross musculoskeletal defects noted, no edema  SKIN: no suspicious lesions or rashes  NEURO: Normal strength and tone, mentation intact and speech normal  PSYCH: mentation appears normal, affect normal/bright        Signed Electronically by: Stephie Lewis NP    "

## 2025-07-01 NOTE — PATIENT INSTRUCTIONS
Patient Education   Preventive Care Advice   This is general advice given by our system to help you stay healthy. However, your care team may have specific advice just for you. Please talk to your care team about your preventive care needs.  Nutrition  Eat 5 or more servings of fruits and vegetables each day.  Try wheat bread, brown rice and whole grain pasta (instead of white bread, rice, and pasta).  Get enough calcium and vitamin D. Check the label on foods and aim for 100% of the RDA (recommended daily allowance).  Lifestyle  Exercise at least 150 minutes each week  (30 minutes a day, 5 days a week).  Do muscle strengthening activities 2 days a week. These help control your weight and prevent disease.  No smoking.  Wear sunscreen to prevent skin cancer.  Have a dental exam and cleaning every 6 months.  Yearly exams  See your health care team every year to talk about:  Any changes in your health.  Any medicines your care team has prescribed.  Preventive care, family planning, and ways to prevent chronic diseases.  Shots (vaccines)   HPV shots (up to age 26), if you've never had them before.  Hepatitis B shots (up to age 59), if you've never had them before.  COVID-19 shot: Get this shot when it's due.  Flu shot: Get a flu shot every year.  Tetanus shot: Get a tetanus shot every 10 years.  Pneumococcal, hepatitis A, and RSV shots: Ask your care team if you need these based on your risk.  Shingles shot (for age 50 and up)  General health tests  Diabetes screening:  Starting at age 35, Get screened for diabetes at least every 3 years.  If you are younger than age 35, ask your care team if you should be screened for diabetes.  Cholesterol test: At age 39, start having a cholesterol test every 5 years, or more often if advised.  Bone density scan (DEXA): At age 50, ask your care team if you should have this scan for osteoporosis (brittle bones).  Hepatitis C: Get tested at least once in your life.  STIs (sexually  transmitted infections)  Before age 24: Ask your care team if you should be screened for STIs.  After age 24: Get screened for STIs if you're at risk. You are at risk for STIs (including HIV) if:  You are sexually active with more than one person.  You don't use condoms every time.  You or a partner was diagnosed with a sexually transmitted infection.  If you are at risk for HIV, ask about PrEP medicine to prevent HIV.  Get tested for HIV at least once in your life, whether you are at risk for HIV or not.  Cancer screening tests  Cervical cancer screening: If you have a cervix, begin getting regular cervical cancer screening tests starting at age 21.  Breast cancer scan (mammogram): If you've ever had breasts, begin having regular mammograms starting at age 40. This is a scan to check for breast cancer.  Colon cancer screening: It is important to start screening for colon cancer at age 45.  Have a colonoscopy test every 10 years (or more often if you're at risk) Or, ask your provider about stool tests like a FIT test every year or Cologuard test every 3 years.  To learn more about your testing options, visit:   .  For help making a decision, visit:   https://bit.ly/zr93348.  Prostate cancer screening test: If you have a prostate, ask your care team if a prostate cancer screening test (PSA) at age 55 is right for you.  Lung cancer screening: If you are a current or former smoker ages 50 to 80, ask your care team if ongoing lung cancer screenings are right for you.  For informational purposes only. Not to replace the advice of your health care provider. Copyright   2023 [employer]. All rights reserved. Clinically reviewed by the [facility]. [employer] [id number] - REV 01/24.

## 2025-07-05 RX ORDER — ROSUVASTATIN CALCIUM 10 MG/1
10 TABLET, COATED ORAL DAILY
Qty: 90 TABLET | Refills: 3 | Status: SHIPPED | OUTPATIENT
Start: 2025-07-05

## 2025-07-09 ENCOUNTER — RADIOLOGY INJECTION OFFICE VISIT (OUTPATIENT)
Dept: PHYSICAL MEDICINE AND REHAB | Facility: CLINIC | Age: 65
End: 2025-07-09
Attending: PAIN MEDICINE
Payer: COMMERCIAL

## 2025-07-09 VITALS
SYSTOLIC BLOOD PRESSURE: 122 MMHG | RESPIRATION RATE: 16 BRPM | OXYGEN SATURATION: 97 % | TEMPERATURE: 98 F | HEART RATE: 79 BPM | DIASTOLIC BLOOD PRESSURE: 72 MMHG

## 2025-07-09 DIAGNOSIS — M53.3 SACROILIAC JOINT PAIN: ICD-10-CM

## 2025-07-09 RX ORDER — LIDOCAINE HYDROCHLORIDE 10 MG/ML
INJECTION, SOLUTION EPIDURAL; INFILTRATION; INTRACAUDAL; PERINEURAL
Status: COMPLETED | OUTPATIENT
Start: 2025-07-09 | End: 2025-07-09

## 2025-07-09 RX ORDER — ROPIVACAINE HYDROCHLORIDE 5 MG/ML
INJECTION, SOLUTION EPIDURAL; INFILTRATION; PERINEURAL
Status: COMPLETED | OUTPATIENT
Start: 2025-07-09 | End: 2025-07-09

## 2025-07-09 RX ORDER — TRIAMCINOLONE ACETONIDE 40 MG/ML
INJECTION, SUSPENSION INTRA-ARTICULAR; INTRAMUSCULAR
Status: COMPLETED | OUTPATIENT
Start: 2025-07-09 | End: 2025-07-09

## 2025-07-09 RX ADMIN — TRIAMCINOLONE ACETONIDE 20 MG: 40 INJECTION, SUSPENSION INTRA-ARTICULAR; INTRAMUSCULAR at 09:30

## 2025-07-09 RX ADMIN — LIDOCAINE HYDROCHLORIDE 2 ML: 10 INJECTION, SOLUTION EPIDURAL; INFILTRATION; INTRACAUDAL; PERINEURAL at 09:29

## 2025-07-09 RX ADMIN — ROPIVACAINE HYDROCHLORIDE 2.5 ML: 5 INJECTION, SOLUTION EPIDURAL; INFILTRATION; PERINEURAL at 09:29

## 2025-07-09 ASSESSMENT — PAIN SCALES - GENERAL
PAINLEVEL_OUTOF10: NO PAIN (0)
PAINLEVEL_OUTOF10: MODERATE PAIN (4)

## 2025-07-09 NOTE — PATIENT INSTRUCTIONS
DISCHARGE INSTRUCTIONS    During office hours (8:00 a.m.- 4:00 p.m.) questions or concerns may be answered  by calling [facility] Nurses at  [phone].  Messages received after hours will be returned the following business day.      In the case of an emergency, please dial 911 or seek assistance at the nearest Emergency Room/Urgent Care facility.     All Patients:    You may experience an increase in your symptoms for the first 2 days (It may take anywhere between 2 days - 2 weeks for the steroid to have maximum effect).    You may use ice on the injection site, as frequently as 20 minutes each hour if needed.    You may take your pain medicine.    You may continue taking your regular medication after your injection. If you have had a medial branch block you may resume pain medication once your pain diary is completed.    You may shower. No swimming, tub bath, or hot tub for 48 hours.  You may remove your bandaid/bandage as soon as you are home.    You may resume light activities, as tolerated.    Resume your usual diet as tolerated.    If you were told to hold any blood thinning medications you may resume taking them 24 hours after your procedure as prescribed.    It is strongly advised that you do not drive for 1-3 hours post injection.    If you have had oral sedation:  Do not drive for 8 hours post injection.        POSSIBLE STEROID SIDE EFFECTS (If steroid/cortisone was used for your procedure    Swelling of the legs              Skin redness (flushing)     Mouth (oral) irritation   Increased blood sugar (glucose) levels            Sweats                    Mood changes  Headache  Sleeplessness  Weakened immune system for up to 14 days, which could increase the risk of saurabh the COVID-19 virus and/or experiencing more severe symptoms of the disease, if exposed.  Decreased effectiveness of vaccines if given within 2 weeks of the steroid.    -If you experience these symptoms, it should  only last for a short period         POSSIBLE PROCEDURE SIDE EFFECTS    Increased Pain           Increased numbness/tingling      Nausea/Vomiting          Bruising/bleeding at site      Redness or swelling                                              Difficulty walking      Weakness           Fever greater than 100.5    -Call the [facility] if you are concerned    *In the event of a severe headache after an epidural steroid injection that is relieved by lying down, please call the [facility] to speak with a clinical staff member*

## 2025-08-13 ENCOUNTER — OFFICE VISIT (OUTPATIENT)
Dept: PHYSICAL MEDICINE AND REHAB | Facility: CLINIC | Age: 65
End: 2025-08-13
Payer: COMMERCIAL

## 2025-08-13 VITALS — DIASTOLIC BLOOD PRESSURE: 76 MMHG | SYSTOLIC BLOOD PRESSURE: 155 MMHG | HEART RATE: 57 BPM | OXYGEN SATURATION: 99 %

## 2025-08-13 DIAGNOSIS — M53.3 SACROILIAC JOINT PAIN: Primary | ICD-10-CM

## 2025-08-13 DIAGNOSIS — M54.16 LUMBAR RADICULOPATHY: ICD-10-CM

## 2025-08-13 DIAGNOSIS — M46.1 SACROILIITIS: ICD-10-CM

## 2025-08-13 PROCEDURE — 99213 OFFICE O/P EST LOW 20 MIN: CPT | Performed by: PAIN MEDICINE

## 2025-08-13 PROCEDURE — 3077F SYST BP >= 140 MM HG: CPT | Performed by: PAIN MEDICINE

## 2025-08-13 PROCEDURE — 3078F DIAST BP <80 MM HG: CPT | Performed by: PAIN MEDICINE

## 2025-08-13 PROCEDURE — 1125F AMNT PAIN NOTED PAIN PRSNT: CPT | Performed by: PAIN MEDICINE

## 2025-08-13 ASSESSMENT — PAIN SCALES - GENERAL: PAINLEVEL_OUTOF10: MILD PAIN (3)

## (undated) DEVICE — TUBING SUCTION MEDI-VAC 1/4"X20' N620A

## (undated) DEVICE — SNARE CAPIVATOR ROUND COLD SNR BX10 M00561101

## (undated) DEVICE — ENDO SNARE EXACTO COLD 9MM LOOP 2.4MMX230CM 00711115

## (undated) DEVICE — SUCTION MANIFOLD NEPTUNE 2 SYS 1 PORT 702-025-000

## (undated) DEVICE — GOWN IMPERVIOUS 2XL BLUE

## (undated) DEVICE — SPECIMEN CONTAINER 3OZ W/FORMALIN 59901

## (undated) DEVICE — ENDO TRAP POLYP E-TRAP 00711099

## (undated) DEVICE — KIT ENDO TURNOVER/PROCEDURE CARRY-ON 101822

## (undated) DEVICE — SOL WATER IRRIG 500ML BOTTLE 2F7113

## (undated) RX ORDER — LIDOCAINE HYDROCHLORIDE 10 MG/ML
INJECTION, SOLUTION EPIDURAL; INFILTRATION; INTRACAUDAL; PERINEURAL
Status: DISPENSED
Start: 2020-05-22

## (undated) RX ORDER — LIDOCAINE HYDROCHLORIDE 10 MG/ML
INJECTION, SOLUTION EPIDURAL; INFILTRATION; INTRACAUDAL; PERINEURAL
Status: DISPENSED
Start: 2020-07-03

## (undated) RX ORDER — DEXAMETHASONE SODIUM PHOSPHATE 10 MG/ML
INJECTION, SOLUTION INTRAMUSCULAR; INTRAVENOUS
Status: DISPENSED
Start: 2020-05-22

## (undated) RX ORDER — BETAMETHASONE SODIUM PHOSPHATE AND BETAMETHASONE ACETATE 3; 3 MG/ML; MG/ML
INJECTION, SUSPENSION INTRA-ARTICULAR; INTRALESIONAL; INTRAMUSCULAR; SOFT TISSUE
Status: DISPENSED
Start: 2020-07-03